# Patient Record
Sex: FEMALE | Race: WHITE | NOT HISPANIC OR LATINO | Employment: OTHER | URBAN - METROPOLITAN AREA
[De-identification: names, ages, dates, MRNs, and addresses within clinical notes are randomized per-mention and may not be internally consistent; named-entity substitution may affect disease eponyms.]

---

## 2018-06-25 ENCOUNTER — TELEPHONE (OUTPATIENT)
Dept: FAMILY MEDICINE | Facility: CLINIC | Age: 68
End: 2018-06-25

## 2018-06-25 DIAGNOSIS — R73.03 PREDIABETES: Primary | ICD-10-CM

## 2018-06-25 NOTE — TELEPHONE ENCOUNTER
----- Message from Saloni Holbrook sent at 6/25/2018 10:45 AM CDT -----  Contact: Patient  Yaneth, patient [phone] calling because she is coming in 8/1/18 and could like to have her blood work done before the appointment, so she would like the orders put in. Please advise. Thanks.

## 2018-07-09 ENCOUNTER — LAB VISIT (OUTPATIENT)
Dept: LAB | Facility: HOSPITAL | Age: 68
End: 2018-07-09
Attending: FAMILY MEDICINE
Payer: MEDICARE

## 2018-07-09 DIAGNOSIS — Z80.41 FAMILY HISTORY OF MALIGNANT NEOPLASM OF OVARY IN FIRST DEGREE RELATIVE: ICD-10-CM

## 2018-07-09 DIAGNOSIS — R73.03 PREDIABETES: Primary | ICD-10-CM

## 2018-07-09 LAB
ALBUMIN SERPL BCP-MCNC: 3.7 G/DL
ALP SERPL-CCNC: 110 U/L
ALT SERPL W/O P-5'-P-CCNC: 24 U/L
ANION GAP SERPL CALC-SCNC: 6 MMOL/L
AST SERPL-CCNC: 24 U/L
BASOPHILS # BLD AUTO: 0.07 K/UL
BASOPHILS NFR BLD: 1 %
BILIRUB SERPL-MCNC: 0.7 MG/DL
BUN SERPL-MCNC: 19 MG/DL
CALCIUM SERPL-MCNC: 9 MG/DL
CHLORIDE SERPL-SCNC: 105 MMOL/L
CHOLEST SERPL-MCNC: 181 MG/DL
CHOLEST/HDLC SERPL: 4.6 {RATIO}
CO2 SERPL-SCNC: 31 MMOL/L
CREAT SERPL-MCNC: 1 MG/DL
DIFFERENTIAL METHOD: ABNORMAL
EOSINOPHIL # BLD AUTO: 0.1 K/UL
EOSINOPHIL NFR BLD: 1.9 %
ERYTHROCYTE [DISTWIDTH] IN BLOOD BY AUTOMATED COUNT: 13.4 %
EST. GFR  (AFRICAN AMERICAN): >60 ML/MIN/1.73 M^2
EST. GFR  (NON AFRICAN AMERICAN): 58.4 ML/MIN/1.73 M^2
ESTIMATED AVG GLUCOSE: 137 MG/DL
GLUCOSE SERPL-MCNC: 119 MG/DL
HBA1C MFR BLD HPLC: 6.4 %
HCT VFR BLD AUTO: 39 %
HDLC SERPL-MCNC: 39 MG/DL
HDLC SERPL: 21.5 %
HGB BLD-MCNC: 12.6 G/DL
IMM GRANULOCYTES # BLD AUTO: 0.18 K/UL
IMM GRANULOCYTES NFR BLD AUTO: 2.5 %
LDLC SERPL CALC-MCNC: 113.6 MG/DL
LYMPHOCYTES # BLD AUTO: 2.5 K/UL
LYMPHOCYTES NFR BLD: 34 %
MCH RBC QN AUTO: 29.4 PG
MCHC RBC AUTO-ENTMCNC: 32.3 G/DL
MCV RBC AUTO: 91 FL
MONOCYTES # BLD AUTO: 0.4 K/UL
MONOCYTES NFR BLD: 5.4 %
NEUTROPHILS # BLD AUTO: 4 K/UL
NEUTROPHILS NFR BLD: 55.2 %
NONHDLC SERPL-MCNC: 142 MG/DL
NRBC BLD-RTO: 0 /100 WBC
PLATELET # BLD AUTO: 218 K/UL
PMV BLD AUTO: 9.8 FL
POTASSIUM SERPL-SCNC: 4.6 MMOL/L
PROT SERPL-MCNC: 7.6 G/DL
RBC # BLD AUTO: 4.28 M/UL
SODIUM SERPL-SCNC: 142 MMOL/L
TRIGL SERPL-MCNC: 142 MG/DL
TSH SERPL DL<=0.005 MIU/L-ACNC: 4.43 UIU/ML
WBC # BLD AUTO: 7.24 K/UL

## 2018-07-09 PROCEDURE — 86304 IMMUNOASSAY TUMOR CA 125: CPT

## 2018-07-09 PROCEDURE — 85025 COMPLETE CBC W/AUTO DIFF WBC: CPT

## 2018-07-09 PROCEDURE — 80061 LIPID PANEL: CPT

## 2018-07-09 PROCEDURE — 83036 HEMOGLOBIN GLYCOSYLATED A1C: CPT

## 2018-07-09 PROCEDURE — 36415 COLL VENOUS BLD VENIPUNCTURE: CPT

## 2018-07-09 PROCEDURE — 84443 ASSAY THYROID STIM HORMONE: CPT

## 2018-07-09 PROCEDURE — 80053 COMPREHEN METABOLIC PANEL: CPT

## 2018-07-10 LAB — CANCER AG125 SERPL-ACNC: 11 U/ML

## 2018-08-01 ENCOUNTER — OFFICE VISIT (OUTPATIENT)
Dept: FAMILY MEDICINE | Facility: CLINIC | Age: 68
End: 2018-08-01
Payer: MEDICARE

## 2018-08-01 VITALS
WEIGHT: 243 LBS | OXYGEN SATURATION: 99 % | TEMPERATURE: 99 F | SYSTOLIC BLOOD PRESSURE: 162 MMHG | DIASTOLIC BLOOD PRESSURE: 58 MMHG | BODY MASS INDEX: 40.48 KG/M2 | HEIGHT: 65 IN | HEART RATE: 63 BPM | RESPIRATION RATE: 18 BRPM

## 2018-08-01 DIAGNOSIS — Z76.0 ENCOUNTER FOR MEDICATION REFILL: Primary | ICD-10-CM

## 2018-08-01 DIAGNOSIS — R73.03 PREDIABETES: ICD-10-CM

## 2018-08-01 PROCEDURE — 99213 OFFICE O/P EST LOW 20 MIN: CPT | Mod: PBBFAC,PN | Performed by: FAMILY MEDICINE

## 2018-08-01 PROCEDURE — 99999 PR PBB SHADOW E&M-EST. PATIENT-LVL III: CPT | Mod: PBBFAC,,, | Performed by: FAMILY MEDICINE

## 2018-08-01 PROCEDURE — 99215 OFFICE O/P EST HI 40 MIN: CPT | Mod: S$PBB,,, | Performed by: FAMILY MEDICINE

## 2018-08-01 RX ORDER — SCOLOPAMINE TRANSDERMAL SYSTEM 1 MG/1
PATCH, EXTENDED RELEASE TRANSDERMAL
COMMUNITY
End: 2018-08-01 | Stop reason: SDUPTHER

## 2018-08-01 RX ORDER — OMEPRAZOLE 20 MG/1
20 CAPSULE, DELAYED RELEASE ORAL DAILY
Qty: 90 CAPSULE | Refills: 3 | Status: SHIPPED | OUTPATIENT
Start: 2018-08-01 | End: 2019-08-03 | Stop reason: SDUPTHER

## 2018-08-01 RX ORDER — METFORMIN HYDROCHLORIDE 500 MG/1
500 TABLET, EXTENDED RELEASE ORAL
Qty: 90 TABLET | Refills: 3 | Status: SHIPPED | OUTPATIENT
Start: 2018-08-01 | End: 2018-10-30

## 2018-08-01 RX ORDER — OMEPRAZOLE 20 MG/1
CAPSULE, DELAYED RELEASE ORAL
COMMUNITY
Start: 2018-07-17 | End: 2018-08-01 | Stop reason: SDUPTHER

## 2018-08-01 RX ORDER — SCOLOPAMINE TRANSDERMAL SYSTEM 1 MG/1
1 PATCH, EXTENDED RELEASE TRANSDERMAL
Qty: 5 PATCH | Refills: 1 | Status: SHIPPED | OUTPATIENT
Start: 2018-08-01 | End: 2018-10-30

## 2018-08-01 NOTE — PROGRESS NOTES
"Subjective:       Patient ID: Yaneth Vera is a 68 y.o. female.    Chief Complaint: Annual Exam    GERD well controlled with current meds, requests refill.    Has been working on diet and exercise, A1c elevated at 6.4.      Review of Systems   Constitutional: Negative for activity change, appetite change, chills, fatigue and fever.   HENT: Negative for congestion, dental problem, facial swelling, nosebleeds, postnasal drip, sinus pain, sore throat, trouble swallowing and voice change.    Eyes: Negative for pain, discharge and visual disturbance.   Respiratory: Negative for apnea, cough, chest tightness and shortness of breath.    Cardiovascular: Negative for chest pain and palpitations.   Gastrointestinal: Negative for abdominal pain, blood in stool, constipation and nausea.   Endocrine: Negative for cold intolerance, polydipsia and polyuria.   Genitourinary: Negative for difficulty urinating, enuresis and flank pain.   Musculoskeletal: Negative for arthralgias and back pain.   Skin: Negative for color change.   Allergic/Immunologic: Negative for environmental allergies and immunocompromised state.   Neurological: Negative for dizziness and light-headedness.   Hematological: Negative for adenopathy.   Psychiatric/Behavioral: Negative for agitation, behavioral problems, decreased concentration and dysphoric mood. The patient is not nervous/anxious.    All other systems reviewed and are negative.        Reviewed family, medical, surgical, and social history.    Objective:      BP (!) 162/58 (BP Location: Left arm)   Pulse 63   Temp 98.6 °F (37 °C)   Resp 18   Ht 5' 5" (1.651 m)   Wt 110.2 kg (243 lb)   SpO2 99%   BMI 40.44 kg/m²   Physical Exam   Constitutional: She is oriented to person, place, and time. She appears well-developed and well-nourished. No distress.   HENT:   Head: Normocephalic and atraumatic.   Nose: Nose normal.   Mouth/Throat: Oropharynx is clear and moist. No oropharyngeal exudate.   Eyes: " Conjunctivae and EOM are normal. Pupils are equal, round, and reactive to light. No scleral icterus.   Neck: Normal range of motion. Neck supple. No thyromegaly present.   Cardiovascular: Normal rate, regular rhythm and normal heart sounds.  Exam reveals no gallop and no friction rub.    No murmur heard.  Pulmonary/Chest: Effort normal and breath sounds normal. No respiratory distress. She has no wheezes. She has no rales. She exhibits no tenderness.   Abdominal: Soft. Bowel sounds are normal. She exhibits no distension. There is no tenderness. There is no guarding.   Musculoskeletal: Normal range of motion. She exhibits no edema, tenderness or deformity.   Lymphadenopathy:     She has no cervical adenopathy.   Neurological: She is alert and oriented to person, place, and time. She displays normal reflexes. No cranial nerve deficit or sensory deficit. She exhibits normal muscle tone.   Skin: Skin is warm and dry. No rash noted. She is not diaphoretic. No erythema. No pallor.   Psychiatric: She has a normal mood and affect. Her behavior is normal. Judgment and thought content normal.   Nursing note and vitals reviewed.      Assessment:       1. Encounter for medication refill    2. Prediabetes        Plan:       Encounter for medication refill  -     omeprazole (PRILOSEC) 20 MG capsule; Take 1 capsule (20 mg total) by mouth once daily.  Dispense: 90 capsule; Refill: 3  -     scopolamine (TRANSDERM-SCOP) 1.3-1.5 mg (1 mg over 3 days); Place 1 patch onto the skin Every 3 (three) days.  Dispense: 5 patch; Refill: 1    Prediabetes    Other orders  -     metFORMIN (GLUCOPHAGE-XR) 500 MG 24 hr tablet; Take 1 tablet (500 mg total) by mouth daily with breakfast.  Dispense: 90 tablet; Refill: 3    Discussed diet, exercise, weight loss, risk factors, etc with prediabetes, 40 minutes spent face to face, with over half in counseling.        Risks, benefits, and side effects were discussed with the patient. All questions were  answered to the fullest satisfaction of the patient, and pt verbalized understanding and agreement to treatment plan. Pt was to call with any new or worsening symptoms, or present to the ER.

## 2018-08-28 ENCOUNTER — TELEPHONE (OUTPATIENT)
Dept: FAMILY MEDICINE | Facility: CLINIC | Age: 68
End: 2018-08-28

## 2018-08-28 NOTE — TELEPHONE ENCOUNTER
"I have spoken with pt.  She stated that she had tightness of the chest last week, went to urgent care, was told it was URI and given ABX.  Pt stated that she had pain in her gums and chest tightness/pain last night.  I  advised that patient report to the ER or urgent care; pt did not wish to do this, as she stated that her symptoms have subsided since beginning amoxicillin, however, she did report tightness of the chest and "heart racing" last night. At patient's request, appt made with Dr. Jean-Baptiste on Thursday.     Charla    "

## 2018-08-28 NOTE — TELEPHONE ENCOUNTER
----- Message from Cesilia Toro sent at 8/28/2018  9:00 AM CDT -----  Contact: self  Patient having heart issues. Would like to be seen today. Please call back at   [phone]

## 2018-08-30 ENCOUNTER — OFFICE VISIT (OUTPATIENT)
Dept: FAMILY MEDICINE | Facility: CLINIC | Age: 68
End: 2018-08-30
Payer: MEDICARE

## 2018-08-30 ENCOUNTER — TELEPHONE (OUTPATIENT)
Dept: FAMILY MEDICINE | Facility: CLINIC | Age: 68
End: 2018-08-30

## 2018-08-30 ENCOUNTER — HOSPITAL ENCOUNTER (OUTPATIENT)
Dept: RADIOLOGY | Facility: HOSPITAL | Age: 68
Discharge: HOME | End: 2018-08-30
Attending: FAMILY MEDICINE
Payer: MEDICARE

## 2018-08-30 ENCOUNTER — HOSPITAL ENCOUNTER (OUTPATIENT)
Dept: CARDIOLOGY | Facility: HOSPITAL | Age: 68
Discharge: HOME | End: 2018-08-30
Attending: FAMILY MEDICINE
Payer: MEDICARE

## 2018-08-30 VITALS
WEIGHT: 242.5 LBS | BODY MASS INDEX: 40.4 KG/M2 | HEIGHT: 65 IN | SYSTOLIC BLOOD PRESSURE: 149 MMHG | OXYGEN SATURATION: 97 % | HEART RATE: 57 BPM | DIASTOLIC BLOOD PRESSURE: 70 MMHG | TEMPERATURE: 98 F | RESPIRATION RATE: 18 BRPM

## 2018-08-30 DIAGNOSIS — R07.9 CHEST PAIN, UNSPECIFIED TYPE: Primary | ICD-10-CM

## 2018-08-30 DIAGNOSIS — R00.2 PALPITATIONS: ICD-10-CM

## 2018-08-30 DIAGNOSIS — R07.9 CHEST PAIN, UNSPECIFIED TYPE: ICD-10-CM

## 2018-08-30 PROCEDURE — 71046 X-RAY EXAM CHEST 2 VIEWS: CPT | Mod: TC,FY

## 2018-08-30 PROCEDURE — 99999 PR PBB SHADOW E&M-EST. PATIENT-LVL IV: CPT | Mod: PBBFAC,,, | Performed by: FAMILY MEDICINE

## 2018-08-30 PROCEDURE — 71046 X-RAY EXAM CHEST 2 VIEWS: CPT | Mod: 26,,, | Performed by: RADIOLOGY

## 2018-08-30 PROCEDURE — 93005 ELECTROCARDIOGRAM TRACING: CPT

## 2018-08-30 PROCEDURE — 99214 OFFICE O/P EST MOD 30 MIN: CPT | Mod: PBBFAC,25,PN | Performed by: FAMILY MEDICINE

## 2018-08-30 PROCEDURE — 99214 OFFICE O/P EST MOD 30 MIN: CPT | Mod: S$PBB,,, | Performed by: FAMILY MEDICINE

## 2018-08-30 RX ORDER — AMOXICILLIN 250 MG/1
CAPSULE ORAL
COMMUNITY
Start: 2018-08-25 | End: 2018-10-30

## 2018-08-30 NOTE — TELEPHONE ENCOUNTER
Pt informed of results.  She would like to see a cardiologist who is local and I will fax her referral to the cardiologists who see patient's here two days a week.  Charla

## 2018-08-30 NOTE — TELEPHONE ENCOUNTER
----- Message from Josemanuel Jean-Baptiste MD sent at 8/30/2018  3:49 PM CDT -----  So far, the labs, CXR and EKG look OK.  When is her Cardiology appt?

## 2018-08-30 NOTE — PROGRESS NOTES
Subjective:       Patient ID: Yaneth Vera is a 68 y.o. female.    Chief Complaint: tightness in chest; Excessive Sweating; increased heart rate; Diarrhea; and pain in gums    New to me patient here for UC visit.    Pt does some mild exercises at a gym [employer] - chair work and treadmill - no CP with that exertion.      Chest Pain    This is a new problem. The current episode started in the past 7 days. The problem occurs intermittently. The pain is present in the substernal region. The pain is moderate. The quality of the pain is described as dull. The pain radiates to the right jaw and left jaw. Associated symptoms include exertional chest pressure, nausea and palpitations. Pertinent negatives include no abdominal pain. Associated symptoms comments: Initial CP seemed to be related to breathing; Rx at an  with antibiotic (but no other URI symptoms).  Next episode was when she woke up with pain in jaw/guns and her heart was racing.. Risk factors include obesity.   Pertinent negatives for past medical history include no hypertension. Past medical history comments: No Tobacco use or elevated lipids. Family history comments: Valvular Heart Disease     Review of Systems   Cardiovascular: Positive for chest pain and palpitations.   Gastrointestinal: Positive for diarrhea and nausea. Negative for abdominal pain.       Objective:      Physical Exam   Constitutional: She appears well-developed and well-nourished.   Eyes: Pupils are equal, round, and reactive to light. No scleral icterus.   Neck: Neck supple.   Cardiovascular: Normal rate and regular rhythm.   No murmur heard.  Pulmonary/Chest: Effort normal and breath sounds normal.   Musculoskeletal: She exhibits no edema or tenderness.   Lymphadenopathy:     She has no cervical adenopathy.   Skin: Skin is warm and dry.       Assessment:       1. Chest pain, unspecified type    2. Palpitations        Plan:       Yaneth was seen today for tightness in chest, excessive sweating,  increased heart rate, diarrhea and pain in gums.    Diagnoses and all orders for this visit:    Chest pain, unspecified type  -     SCHEDULED EKG 12-LEAD (to Muse); Future  -     Troponin I; Future  -     CK-MB; Future  -     X-Ray Chest PA And Lateral; Future  -     Ambulatory referral to Cardiology    Palpitations  -     SCHEDULED EKG 12-LEAD (to Muse); Future  -     Troponin I; Future  -     CK-MB; Future  -     X-Ray Chest PA And Lateral; Future  -     Ambulatory referral to Cardiology      Patient Instructions   Aspirin 81 mg a day or every other day.  Uncertain Causes of Chest Pain    Chest pain can happen for a number of reasons. Sometimes the cause can't be determined. If your condition does not seem serious, and your pain does not appear to be coming from your heart, your healthcare provider may recommend watching it closely. Sometimes the signs of a serious problem take more time to appear. Many problems not related to your heart can cause chest pain.These include:  · Musculoskeletal. Costochondritis, an inflammation of the tissues around the ribs that can occur from trauma or overuse injuries  · Respiratory. Pneumonia, pneumothorax, or pneumonitis (inflammation of the lining of the chest and lungs)  · Gastrointestinal. Esophageal reflux, heartburn, or gallbladder disease  · Anxiety and panic disorders  · Nerve compression and neuritis  · Miscellaneous problems such as aortic aneurysm or pulmonary embolism (a blood clot in the lungs)  Home care  After your visit, follow these recommendations:  · Rest today and avoid strenuous activity.  · Take any prescribed medicine as directed.  · Be aware of any recurrent chest pain and notice any changes  Follow-up care  Follow up with your healthcare provider if you do not start to feel better within 24 hours, or as advised.  Call 911  Call 911 if any of these occur:  · A change in the type of pain: if it feels different, becomes more severe, lasts longer, or begins  to spread into your shoulder, arm, neck, jaw or back  · Shortness of breath or increased pain with breathing  · Weakness, dizziness, or fainting  · Rapid heart beat  · Crushing sensation in your chest  When to seek medical advice  Call your healthcare provider right away if any of the following occur:  · Cough with dark colored sputum (phlegm) or blood  · Fever of 100.4ºF (38ºC) or higher, or as directed by your healthcare provider  · Swelling, pain or redness in one leg  · Shortness of breath  Date Last Reviewed: 12/30/2015  © 4916-3384 [employer]. [address] [place] [id number]. All rights reserved. This information is not intended as a substitute for professional medical care. Always follow your healthcare professional's instructions.

## 2018-08-30 NOTE — PATIENT INSTRUCTIONS
Aspirin 81 mg a day or every other day.  Uncertain Causes of Chest Pain    Chest pain can happen for a number of reasons. Sometimes the cause can't be determined. If your condition does not seem serious, and your pain does not appear to be coming from your heart, your healthcare provider may recommend watching it closely. Sometimes the signs of a serious problem take more time to appear. Many problems not related to your heart can cause chest pain.These include:  · Musculoskeletal. Costochondritis, an inflammation of the tissues around the ribs that can occur from trauma or overuse injuries  · Respiratory. Pneumonia, pneumothorax, or pneumonitis (inflammation of the lining of the chest and lungs)  · Gastrointestinal. Esophageal reflux, heartburn, or gallbladder disease  · Anxiety and panic disorders  · Nerve compression and neuritis  · Miscellaneous problems such as aortic aneurysm or pulmonary embolism (a blood clot in the lungs)  Home care  After your visit, follow these recommendations:  · Rest today and avoid strenuous activity.  · Take any prescribed medicine as directed.  · Be aware of any recurrent chest pain and notice any changes  Follow-up care  Follow up with your healthcare provider if you do not start to feel better within 24 hours, or as advised.  Call 911  Call 911 if any of these occur:  · A change in the type of pain: if it feels different, becomes more severe, lasts longer, or begins to spread into your shoulder, arm, neck, jaw or back  · Shortness of breath or increased pain with breathing  · Weakness, dizziness, or fainting  · Rapid heart beat  · Crushing sensation in your chest  When to seek medical advice  Call your healthcare provider right away if any of the following occur:  · Cough with dark colored sputum (phlegm) or blood  · Fever of 100.4ºF (38ºC) or higher, or as directed by your healthcare provider  · Swelling, pain or redness in one leg  · Shortness of breath  Date Last Reviewed:  12/30/2015  © 8205-0658 [employer]. [address] [place] [address], [place] [address]. All rights reserved. This information is not intended as a substitute for professional medical care. Always follow your healthcare professional's instructions.

## 2018-08-31 NOTE — TELEPHONE ENCOUNTER
It was put in, however, when I spoke with her and she realized that it would be a cardiologist that would not be in this immediate area, she requested a closer one; that is the reason that I faxed it to the cardiologists who see patients here this [facility] a couple/few days a week. Is that okay?  Charla

## 2018-09-04 ENCOUNTER — TELEPHONE (OUTPATIENT)
Dept: FAMILY MEDICINE | Facility: CLINIC | Age: 68
End: 2018-09-04

## 2018-09-04 NOTE — TELEPHONE ENCOUNTER
I have spoken with pt, informed her that it would be uncommon but she may stop the metformin for a couple of days to see if the symptoms subside.  Charla

## 2018-09-04 NOTE — TELEPHONE ENCOUNTER
----- Message from Amee Travis sent at 9/4/2018 10:30 AM CDT -----  Contact: patient   Patient calling to schedule an appointment with Cardiologist Jeb Lake MD. He comes to the [facility] location twice a month. Please advise.   Call back    Thanks!

## 2018-09-04 NOTE — TELEPHONE ENCOUNTER
That would be an uncommon side effect, but she can try stopping the medicine for a day or two and see if the symptoms resolve

## 2018-09-04 NOTE — TELEPHONE ENCOUNTER
"I have spoken with pt and gave [facility] telephone number.      Pt is asking if the metformin is causing the "heart racing"; stated that she believes the problems for which she is being referred to cardiology are side effects of the metformin.  Please advise and thanks, Charla  "

## 2018-10-30 ENCOUNTER — OFFICE VISIT (OUTPATIENT)
Dept: FAMILY MEDICINE | Facility: CLINIC | Age: 68
End: 2018-10-30
Payer: MEDICARE

## 2018-10-30 VITALS
SYSTOLIC BLOOD PRESSURE: 162 MMHG | WEIGHT: 238.81 LBS | RESPIRATION RATE: 18 BRPM | OXYGEN SATURATION: 97 % | HEIGHT: 65 IN | BODY MASS INDEX: 39.79 KG/M2 | DIASTOLIC BLOOD PRESSURE: 64 MMHG | HEART RATE: 59 BPM

## 2018-10-30 DIAGNOSIS — R73.03 PREDIABETES: ICD-10-CM

## 2018-10-30 DIAGNOSIS — M81.0 SENILE OSTEOPOROSIS: Primary | ICD-10-CM

## 2018-10-30 PROCEDURE — 99999 PR PBB SHADOW E&M-EST. PATIENT-LVL III: CPT | Mod: PBBFAC,,, | Performed by: FAMILY MEDICINE

## 2018-10-30 PROCEDURE — 99214 OFFICE O/P EST MOD 30 MIN: CPT | Mod: S$PBB,,, | Performed by: FAMILY MEDICINE

## 2018-10-30 PROCEDURE — 99213 OFFICE O/P EST LOW 20 MIN: CPT | Mod: PBBFAC,PN | Performed by: FAMILY MEDICINE

## 2018-10-30 RX ORDER — GLIMEPIRIDE 1 MG/1
1 TABLET ORAL
Qty: 30 TABLET | Refills: 3 | Status: SHIPPED | OUTPATIENT
Start: 2018-10-30 | End: 2019-06-17 | Stop reason: SDUPTHER

## 2018-11-01 ENCOUNTER — HOSPITAL ENCOUNTER (OUTPATIENT)
Dept: RADIOLOGY | Facility: HOSPITAL | Age: 68
Discharge: HOME | End: 2018-11-01
Attending: FAMILY MEDICINE
Payer: MEDICARE

## 2018-11-01 DIAGNOSIS — M81.0 SENILE OSTEOPOROSIS: ICD-10-CM

## 2018-11-01 PROCEDURE — 77080 DXA BONE DENSITY AXIAL: CPT | Mod: TC

## 2018-11-01 PROCEDURE — 77080 DXA BONE DENSITY AXIAL: CPT | Mod: 26,,, | Performed by: RADIOLOGY

## 2019-05-02 DIAGNOSIS — Z12.11 COLON CANCER SCREENING: ICD-10-CM

## 2019-05-03 DIAGNOSIS — E11.9 TYPE 2 DIABETES MELLITUS WITHOUT COMPLICATION: ICD-10-CM

## 2019-05-03 DIAGNOSIS — Z11.59 NEED FOR HEPATITIS C SCREENING TEST: ICD-10-CM

## 2019-06-17 DIAGNOSIS — R73.03 PREDIABETES: ICD-10-CM

## 2019-06-17 RX ORDER — GLIMEPIRIDE 1 MG/1
1 TABLET ORAL
Qty: 30 TABLET | Refills: 3 | Status: SHIPPED | OUTPATIENT
Start: 2019-06-17 | End: 2020-02-14

## 2019-06-17 NOTE — TELEPHONE ENCOUNTER
----- Message from Rocio Johnson sent at 6/17/2019  9:07 AM CDT -----  Contact: self   Patient need a refill on glimepiride 1 MG 30day supply please send to [facility], any questions please call back at [phone] (home)     [facility] - [place], [place] - [address]  [address]  [place] [address]  Phone: [phone] Fax: [phone]

## 2019-06-27 DIAGNOSIS — Z12.39 BREAST CANCER SCREENING: ICD-10-CM

## 2019-07-11 DIAGNOSIS — E11.9 TYPE 2 DIABETES MELLITUS WITHOUT COMPLICATION: ICD-10-CM

## 2019-08-03 DIAGNOSIS — Z76.0 ENCOUNTER FOR MEDICATION REFILL: ICD-10-CM

## 2019-08-04 RX ORDER — OMEPRAZOLE 20 MG/1
CAPSULE, DELAYED RELEASE ORAL
Qty: 90 CAPSULE | Refills: 3 | Status: SHIPPED | OUTPATIENT
Start: 2019-08-04 | End: 2020-05-28 | Stop reason: SDUPTHER

## 2019-08-19 ENCOUNTER — TELEPHONE (OUTPATIENT)
Dept: FAMILY MEDICINE | Facility: CLINIC | Age: 69
End: 2019-08-19

## 2019-08-19 DIAGNOSIS — R73.03 PREDIABETES: Primary | ICD-10-CM

## 2019-08-26 ENCOUNTER — TELEPHONE (OUTPATIENT)
Dept: FAMILY MEDICINE | Facility: CLINIC | Age: 69
End: 2019-08-26

## 2019-08-26 DIAGNOSIS — R30.0 DYSURIA: Primary | ICD-10-CM

## 2019-08-26 NOTE — TELEPHONE ENCOUNTER
Pt c/o dysuria and frequent urination.   Pt requesting lab orders placed for possible UTI.  Please advise, thank you.        ----- Message from Price Riggins sent at 8/26/2019  9:42 AM CDT -----  Contact: Patient  Type: Needs Medical Advice    Who Called:  Patient  Symptoms (please be specific):  UTI  Best Call Back Number: [phone]  Additional Information: Patient is requesting orders for urinalysis to be tested for UTI. Please advise when orders are in

## 2019-08-27 ENCOUNTER — LAB VISIT (OUTPATIENT)
Dept: LAB | Facility: HOSPITAL | Age: 69
End: 2019-08-27
Attending: FAMILY MEDICINE
Payer: MEDICARE

## 2019-08-27 DIAGNOSIS — R30.0 DYSURIA: ICD-10-CM

## 2019-08-27 LAB
BILIRUB UR QL STRIP: NEGATIVE
CLARITY UR: CLEAR
COLOR UR: YELLOW
GLUCOSE UR QL STRIP: NEGATIVE
HGB UR QL STRIP: NEGATIVE
KETONES UR QL STRIP: ABNORMAL
LEUKOCYTE ESTERASE UR QL STRIP: NEGATIVE
NITRITE UR QL STRIP: NEGATIVE
PH UR STRIP: 5 [PH] (ref 5–8)
PROT UR QL STRIP: NEGATIVE
SP GR UR STRIP: 1.02 (ref 1–1.03)
URN SPEC COLLECT METH UR: ABNORMAL
UROBILINOGEN UR STRIP-ACNC: NEGATIVE EU/DL

## 2019-08-27 PROCEDURE — 87086 URINE CULTURE/COLONY COUNT: CPT

## 2019-08-27 PROCEDURE — 81003 URINALYSIS AUTO W/O SCOPE: CPT

## 2019-08-28 LAB — BACTERIA UR CULT: NORMAL

## 2019-08-29 ENCOUNTER — TELEPHONE (OUTPATIENT)
Dept: FAMILY MEDICINE | Facility: CLINIC | Age: 69
End: 2019-08-29

## 2019-08-29 NOTE — TELEPHONE ENCOUNTER
Informed pt that her urine results did not show an infection. Pt stated that she is still having lower back pain, but she's fine that she'll discuss further with the provider when she comes in for appt in October.

## 2019-08-29 NOTE — TELEPHONE ENCOUNTER
----- Message from Cesar Vanessa MD sent at 8/29/2019  6:16 AM CDT -----  Urine studies did not show an infection. How is she feeling?

## 2019-09-03 ENCOUNTER — TELEPHONE (OUTPATIENT)
Dept: FAMILY MEDICINE | Facility: CLINIC | Age: 69
End: 2019-09-03

## 2019-09-24 ENCOUNTER — LAB VISIT (OUTPATIENT)
Dept: LAB | Facility: HOSPITAL | Age: 69
End: 2019-09-24
Attending: FAMILY MEDICINE
Payer: MEDICARE

## 2019-09-24 DIAGNOSIS — R73.03 PREDIABETES: ICD-10-CM

## 2019-09-24 LAB
ALBUMIN SERPL BCP-MCNC: 3.7 G/DL (ref 3.5–5.2)
ALP SERPL-CCNC: 97 U/L (ref 55–135)
ALT SERPL W/O P-5'-P-CCNC: 24 U/L (ref 10–44)
ANION GAP SERPL CALC-SCNC: 10 MMOL/L (ref 8–16)
AST SERPL-CCNC: 23 U/L (ref 10–40)
BASOPHILS # BLD AUTO: 0.06 K/UL (ref 0–0.2)
BASOPHILS NFR BLD: 0.9 % (ref 0–1.9)
BILIRUB SERPL-MCNC: 0.8 MG/DL (ref 0.1–1)
BUN SERPL-MCNC: 20 MG/DL (ref 8–23)
CALCIUM SERPL-MCNC: 8.8 MG/DL (ref 8.7–10.5)
CHLORIDE SERPL-SCNC: 104 MMOL/L (ref 95–110)
CHOLEST SERPL-MCNC: 196 MG/DL (ref 120–199)
CHOLEST/HDLC SERPL: 3.9 {RATIO} (ref 2–5)
CO2 SERPL-SCNC: 25 MMOL/L (ref 23–29)
CREAT SERPL-MCNC: 0.9 MG/DL (ref 0.5–1.4)
DIFFERENTIAL METHOD: ABNORMAL
EOSINOPHIL # BLD AUTO: 0.1 K/UL (ref 0–0.5)
EOSINOPHIL NFR BLD: 1.1 % (ref 0–8)
ERYTHROCYTE [DISTWIDTH] IN BLOOD BY AUTOMATED COUNT: 13.5 % (ref 11.5–14.5)
EST. GFR  (AFRICAN AMERICAN): >60 ML/MIN/1.73 M^2
EST. GFR  (NON AFRICAN AMERICAN): >60 ML/MIN/1.73 M^2
ESTIMATED AVG GLUCOSE: 140 MG/DL (ref 68–131)
GLUCOSE SERPL-MCNC: 127 MG/DL (ref 70–110)
HBA1C MFR BLD HPLC: 6.5 % (ref 4.5–6.2)
HCT VFR BLD AUTO: 40.9 % (ref 37–48.5)
HDLC SERPL-MCNC: 50 MG/DL (ref 40–75)
HDLC SERPL: 25.5 % (ref 20–50)
HGB BLD-MCNC: 12.9 G/DL (ref 12–16)
IMM GRANULOCYTES # BLD AUTO: 0.05 K/UL (ref 0–0.04)
IMM GRANULOCYTES NFR BLD AUTO: 0.8 % (ref 0–0.5)
LDLC SERPL CALC-MCNC: 121.2 MG/DL (ref 63–159)
LYMPHOCYTES # BLD AUTO: 1.8 K/UL (ref 1–4.8)
LYMPHOCYTES NFR BLD: 27.6 % (ref 18–48)
MCH RBC QN AUTO: 28.5 PG (ref 27–31)
MCHC RBC AUTO-ENTMCNC: 31.5 G/DL (ref 32–36)
MCV RBC AUTO: 90 FL (ref 82–98)
MONOCYTES # BLD AUTO: 0.4 K/UL (ref 0.3–1)
MONOCYTES NFR BLD: 6.6 % (ref 4–15)
NEUTROPHILS # BLD AUTO: 4.1 K/UL (ref 1.8–7.7)
NEUTROPHILS NFR BLD: 63 % (ref 38–73)
NONHDLC SERPL-MCNC: 146 MG/DL
NRBC BLD-RTO: 0 /100 WBC
PLATELET # BLD AUTO: 208 K/UL (ref 150–350)
PMV BLD AUTO: 9.7 FL (ref 9.2–12.9)
POTASSIUM SERPL-SCNC: 4 MMOL/L (ref 3.5–5.1)
PROT SERPL-MCNC: 7.4 G/DL (ref 6–8.4)
RBC # BLD AUTO: 4.53 M/UL (ref 4–5.4)
SODIUM SERPL-SCNC: 139 MMOL/L (ref 136–145)
T4 FREE SERPL-MCNC: 1.01 NG/DL (ref 0.71–1.51)
TRIGL SERPL-MCNC: 124 MG/DL (ref 30–150)
TSH SERPL DL<=0.005 MIU/L-ACNC: 3.6 UIU/ML (ref 0.34–5.6)
WBC # BLD AUTO: 6.56 K/UL (ref 3.9–12.7)

## 2019-09-24 PROCEDURE — 84443 ASSAY THYROID STIM HORMONE: CPT

## 2019-09-24 PROCEDURE — 83036 HEMOGLOBIN GLYCOSYLATED A1C: CPT

## 2019-09-24 PROCEDURE — 80061 LIPID PANEL: CPT

## 2019-09-24 PROCEDURE — 85025 COMPLETE CBC W/AUTO DIFF WBC: CPT

## 2019-09-24 PROCEDURE — 36415 COLL VENOUS BLD VENIPUNCTURE: CPT

## 2019-09-24 PROCEDURE — 80053 COMPREHEN METABOLIC PANEL: CPT

## 2019-09-24 PROCEDURE — 84439 ASSAY OF FREE THYROXINE: CPT

## 2019-10-29 ENCOUNTER — LAB VISIT (OUTPATIENT)
Dept: LAB | Facility: HOSPITAL | Age: 69
End: 2019-10-29
Attending: FAMILY MEDICINE
Payer: MEDICARE

## 2019-10-29 DIAGNOSIS — Z12.11 COLON CANCER SCREENING: ICD-10-CM

## 2019-10-29 PROCEDURE — 82274 ASSAY TEST FOR BLOOD FECAL: CPT

## 2019-10-30 ENCOUNTER — OFFICE VISIT (OUTPATIENT)
Dept: FAMILY MEDICINE | Facility: CLINIC | Age: 69
End: 2019-10-30
Payer: MEDICARE

## 2019-10-30 ENCOUNTER — PATIENT OUTREACH (OUTPATIENT)
Dept: FAMILY MEDICINE | Facility: CLINIC | Age: 69
End: 2019-10-30

## 2019-10-30 VITALS
SYSTOLIC BLOOD PRESSURE: 137 MMHG | OXYGEN SATURATION: 97 % | HEIGHT: 65 IN | BODY MASS INDEX: 43.59 KG/M2 | HEART RATE: 58 BPM | DIASTOLIC BLOOD PRESSURE: 60 MMHG | RESPIRATION RATE: 20 BRPM | WEIGHT: 261.63 LBS

## 2019-10-30 DIAGNOSIS — R73.03 PREDIABETES: ICD-10-CM

## 2019-10-30 DIAGNOSIS — Z00.00 ANNUAL PHYSICAL EXAM: Primary | ICD-10-CM

## 2019-10-30 DIAGNOSIS — Z23 NEED FOR VACCINATION: ICD-10-CM

## 2019-10-30 PROCEDURE — 99397 PER PM REEVAL EST PAT 65+ YR: CPT | Mod: S$PBB,,, | Performed by: FAMILY MEDICINE

## 2019-10-30 PROCEDURE — G0009 ADMIN PNEUMOCOCCAL VACCINE: HCPCS | Mod: PBBFAC,PN

## 2019-10-30 PROCEDURE — 99999 PR PBB SHADOW E&M-EST. PATIENT-LVL III: CPT | Mod: PBBFAC,,, | Performed by: FAMILY MEDICINE

## 2019-10-30 PROCEDURE — 99999 PR PBB SHADOW E&M-EST. PATIENT-LVL III: ICD-10-PCS | Mod: PBBFAC,,, | Performed by: FAMILY MEDICINE

## 2019-10-30 PROCEDURE — 99397 PR PREVENTIVE VISIT,EST,65 & OVER: ICD-10-PCS | Mod: S$PBB,,, | Performed by: FAMILY MEDICINE

## 2019-10-30 PROCEDURE — 99213 OFFICE O/P EST LOW 20 MIN: CPT | Mod: PBBFAC,PN,25 | Performed by: FAMILY MEDICINE

## 2019-10-30 RX ORDER — KETOCONAZOLE 20 MG/G
CREAM TOPICAL DAILY
Qty: 60 G | Refills: 2 | Status: SHIPPED | OUTPATIENT
Start: 2019-10-30 | End: 2024-03-04

## 2019-10-30 RX ORDER — HYDROCHLOROTHIAZIDE 25 MG/1
25 TABLET ORAL DAILY
Qty: 14 TABLET | Refills: 0 | Status: SHIPPED | OUTPATIENT
Start: 2019-10-30 | End: 2020-02-14

## 2019-10-30 NOTE — PROGRESS NOTES
"Subjective:       Patient ID: Yaneth Vera is a 69 y.o. female.    Chief Complaint: Annual Exam (Wellness Visit, would like foot exam, Colonoscopy completed @ [facility], Eye exam completed w/Dr. Becerra)    Patient reports they are feeling well without complaints today. Pt reports adherence to generally healthy diet, exercise plan, and good sleep schedule. Anticipatory guidance was provided. Wellness labs and procedures for age were discussed.      Review of Systems   Constitutional: Negative for activity change, appetite change, chills, fatigue and fever.   HENT: Negative for congestion, dental problem, facial swelling, nosebleeds, postnasal drip, sinus pain, sore throat, trouble swallowing and voice change.    Eyes: Negative for pain, discharge and visual disturbance.   Respiratory: Negative for apnea, cough, chest tightness and shortness of breath.    Cardiovascular: Negative for chest pain and palpitations.   Gastrointestinal: Negative for abdominal pain, blood in stool, constipation and nausea.   Endocrine: Negative for cold intolerance, polydipsia and polyuria.   Genitourinary: Negative for difficulty urinating, enuresis and flank pain.   Musculoskeletal: Negative for arthralgias and back pain.   Skin: Negative for color change.   Allergic/Immunologic: Negative for environmental allergies and immunocompromised state.   Neurological: Negative for dizziness and light-headedness.   Hematological: Negative for adenopathy.   Psychiatric/Behavioral: Negative for agitation, behavioral problems, decreased concentration and dysphoric mood. The patient is not nervous/anxious.    All other systems reviewed and are negative.        Reviewed family, medical, surgical, and social history.    Objective:      /60   Pulse (!) 58   Resp 20   Ht 5' 5" (1.651 m)   Wt 118.7 kg (261 lb 9.6 oz)   SpO2 97%   BMI 43.53 kg/m²   Physical Exam   Constitutional: She is oriented to person, place, and time. She appears " well-developed and well-nourished. No distress.   HENT:   Head: Normocephalic and atraumatic.   Nose: Nose normal.   Mouth/Throat: Oropharynx is clear and moist. No oropharyngeal exudate.   Eyes: Pupils are equal, round, and reactive to light. Conjunctivae and EOM are normal. No scleral icterus.   Neck: Normal range of motion. Neck supple. No thyromegaly present.   Cardiovascular: Normal rate, regular rhythm and normal heart sounds. Exam reveals no gallop and no friction rub.   No murmur heard.  Pulmonary/Chest: Effort normal and breath sounds normal. No respiratory distress. She has no wheezes. She has no rales. She exhibits no tenderness.   Abdominal: Soft. Bowel sounds are normal. She exhibits no distension. There is no tenderness. There is no guarding.   Musculoskeletal: Normal range of motion. She exhibits no edema, tenderness or deformity.   Lymphadenopathy:     She has no cervical adenopathy.   Neurological: She is alert and oriented to person, place, and time. She displays normal reflexes. No cranial nerve deficit or sensory deficit. She exhibits normal muscle tone.   Skin: Skin is warm and dry. No rash noted. She is not diaphoretic. No erythema. No pallor.   Psychiatric: She has a normal mood and affect. Her behavior is normal. Judgment and thought content normal.   Nursing note and vitals reviewed.      Assessment:       1. Annual physical exam    2. Need for vaccination    3. Prediabetes        Plan:       Annual physical exam    Need for vaccination  -     Pneumococcal Conjugate Vaccine (13 Valent) (IM)    Prediabetes  -     Hemoglobin A1c; Future; Expected date: 01/30/2020    Other orders  -     ketoconazole (NIZORAL) 2 % cream; Apply topically once daily.  Dispense: 60 g; Refill: 2  -     hydroCHLOROthiazide (HYDRODIURIL) 25 MG tablet; Take 1 tablet (25 mg total) by mouth once daily. for 14 days  Dispense: 14 tablet; Refill: 0            Risks, benefits, and side effects were discussed with the  patient. All questions were answered to the fullest satisfaction of the patient, and pt verbalized understanding and agreement to treatment plan. Pt was to call with any new or worsening symptoms, or present to the ER.

## 2019-10-30 NOTE — PROGRESS NOTES
Pt received fitkit in mail and has collected sample.  Currently at Dr. Vanessa office with sample and instructed pt to place envelope in mail as it is sent to a different lab.  Voiced understanding.

## 2019-11-05 LAB — HEMOCCULT STL QL IA: NEGATIVE

## 2019-11-19 LAB
LEFT EYE DM RETINOPATHY: NEGATIVE
RIGHT EYE DM RETINOPATHY: NEGATIVE

## 2019-12-12 ENCOUNTER — PATIENT OUTREACH (OUTPATIENT)
Dept: ADMINISTRATIVE | Facility: HOSPITAL | Age: 69
End: 2019-12-12

## 2019-12-31 DIAGNOSIS — Z12.31 ENCOUNTER FOR SCREENING MAMMOGRAM FOR MALIGNANT NEOPLASM OF BREAST: Primary | ICD-10-CM

## 2020-01-15 ENCOUNTER — HOSPITAL ENCOUNTER (OUTPATIENT)
Dept: RADIOLOGY | Facility: HOSPITAL | Age: 70
Discharge: HOME | End: 2020-01-15
Attending: OBSTETRICS & GYNECOLOGY
Payer: MEDICARE

## 2020-01-15 VITALS — BODY MASS INDEX: 43.6 KG/M2 | WEIGHT: 261.69 LBS | HEIGHT: 65 IN

## 2020-01-15 DIAGNOSIS — Z12.31 ENCOUNTER FOR SCREENING MAMMOGRAM FOR MALIGNANT NEOPLASM OF BREAST: ICD-10-CM

## 2020-01-15 PROCEDURE — 77067 SCR MAMMO BI INCL CAD: CPT | Mod: TC,PO

## 2020-02-13 ENCOUNTER — PATIENT OUTREACH (OUTPATIENT)
Dept: ADMINISTRATIVE | Facility: HOSPITAL | Age: 70
End: 2020-02-13

## 2020-02-14 ENCOUNTER — OFFICE VISIT (OUTPATIENT)
Dept: FAMILY MEDICINE | Facility: CLINIC | Age: 70
End: 2020-02-14
Payer: MEDICARE

## 2020-02-14 VITALS
WEIGHT: 253 LBS | SYSTOLIC BLOOD PRESSURE: 136 MMHG | BODY MASS INDEX: 42.15 KG/M2 | HEART RATE: 63 BPM | DIASTOLIC BLOOD PRESSURE: 84 MMHG | OXYGEN SATURATION: 96 % | RESPIRATION RATE: 20 BRPM | HEIGHT: 65 IN

## 2020-02-14 DIAGNOSIS — R73.03 PREDIABETES: ICD-10-CM

## 2020-02-14 DIAGNOSIS — R60.0 LOCALIZED EDEMA: Primary | ICD-10-CM

## 2020-02-14 DIAGNOSIS — J01.00 ACUTE NON-RECURRENT MAXILLARY SINUSITIS: ICD-10-CM

## 2020-02-14 PROCEDURE — 99214 OFFICE O/P EST MOD 30 MIN: CPT | Mod: S$PBB,,, | Performed by: FAMILY MEDICINE

## 2020-02-14 PROCEDURE — 99999 PR PBB SHADOW E&M-EST. PATIENT-LVL III: CPT | Mod: PBBFAC,,, | Performed by: FAMILY MEDICINE

## 2020-02-14 PROCEDURE — 99214 PR OFFICE/OUTPT VISIT, EST, LEVL IV, 30-39 MIN: ICD-10-PCS | Mod: S$PBB,,, | Performed by: FAMILY MEDICINE

## 2020-02-14 PROCEDURE — 99213 OFFICE O/P EST LOW 20 MIN: CPT | Mod: PBBFAC,PN | Performed by: FAMILY MEDICINE

## 2020-02-14 PROCEDURE — 99999 PR PBB SHADOW E&M-EST. PATIENT-LVL III: ICD-10-PCS | Mod: PBBFAC,,, | Performed by: FAMILY MEDICINE

## 2020-02-14 RX ORDER — HYDROCHLOROTHIAZIDE 25 MG/1
25 TABLET ORAL DAILY
Qty: 7 TABLET | Refills: 0 | Status: SHIPPED | OUTPATIENT
Start: 2020-02-14 | End: 2020-11-02

## 2020-02-14 RX ORDER — AZITHROMYCIN 250 MG/1
TABLET, FILM COATED ORAL
Qty: 6 TABLET | Refills: 0 | Status: SHIPPED | OUTPATIENT
Start: 2020-02-14 | End: 2020-07-17

## 2020-02-14 NOTE — PROGRESS NOTES
"Subjective:       Patient ID: Yaneth Vera is a 69 y.o. female.    Chief Complaint: Follow-up (BW review, Pt would like foot exam )    Cough  Last day  Worsening  Associated with sinus pressure  Nothing makes better or worse  + sick contacts      Review of Systems   Constitutional: Positive for activity change and appetite change. Negative for chills, fatigue and fever.   HENT: Positive for congestion, postnasal drip, rhinorrhea, sinus pressure, sinus pain and sore throat. Negative for dental problem, ear pain, facial swelling, nosebleeds, trouble swallowing and voice change.    Eyes: Negative for pain, discharge and visual disturbance.   Respiratory: Positive for cough and wheezing. Negative for apnea, chest tightness and shortness of breath.    Cardiovascular: Negative for chest pain and palpitations.   Gastrointestinal: Negative for abdominal pain, blood in stool, constipation and nausea.   Endocrine: Negative for cold intolerance, polydipsia and polyuria.   Genitourinary: Negative for difficulty urinating, enuresis and flank pain.   Musculoskeletal: Negative for arthralgias and back pain.   Skin: Negative for color change.   Allergic/Immunologic: Negative for environmental allergies and immunocompromised state.   Neurological: Negative for dizziness and light-headedness.   Hematological: Negative for adenopathy.   Psychiatric/Behavioral: Negative for agitation, behavioral problems, decreased concentration and dysphoric mood. The patient is not nervous/anxious.    All other systems reviewed and are negative.        Reviewed family, medical, surgical, and social history.    Objective:      /84 (BP Location: Left arm, Patient Position: Sitting, BP Method: Large (Automatic))   Pulse 63   Resp 20   Ht 5' 5" (1.651 m)   Wt 114.8 kg (253 lb)   SpO2 96%   BMI 42.10 kg/m²   Physical Exam   Constitutional: She is oriented to person, place, and time. She appears well-developed and well-nourished. No distress. "   HENT:   Head: Normocephalic and atraumatic.   Nose: Nose normal.   Mouth/Throat: Oropharynx is clear and moist. No oropharyngeal exudate.   Eyes: Pupils are equal, round, and reactive to light. Conjunctivae and EOM are normal. No scleral icterus.   Neck: Normal range of motion. Neck supple. No thyromegaly present.   Cardiovascular: Normal rate, regular rhythm and normal heart sounds. Exam reveals no gallop and no friction rub.   No murmur heard.  Pulmonary/Chest: Effort normal and breath sounds normal. No respiratory distress. She has no wheezes. She has no rales. She exhibits no tenderness.   Abdominal: Soft. Bowel sounds are normal. She exhibits no distension. There is no tenderness. There is no guarding.   Musculoskeletal: Normal range of motion. She exhibits no edema, tenderness or deformity.   Lymphadenopathy:     She has no cervical adenopathy.   Neurological: She is alert and oriented to person, place, and time. She displays normal reflexes. No cranial nerve deficit or sensory deficit. She exhibits normal muscle tone.   Skin: Skin is warm and dry. No rash noted. She is not diaphoretic. No erythema. No pallor.   Psychiatric: She has a normal mood and affect. Her behavior is normal. Judgment and thought content normal.   Nursing note and vitals reviewed.      Assessment:       1. Localized edema    2. Acute non-recurrent maxillary sinusitis    3. Prediabetes        Plan:       Localized edema  -     hydroCHLOROthiazide (HYDRODIURIL) 25 MG tablet; Take 1 tablet (25 mg total) by mouth once daily.  Dispense: 7 tablet; Refill: 0    Acute non-recurrent maxillary sinusitis  -     azithromycin (Z-SANTOSH) 250 MG tablet; Take two tablets on day 1, then 1 tablet on days 2-5.  Dispense: 6 tablet; Refill: 0    Prediabetes  -     Hemoglobin A1c; Future; Expected date: 08/14/2020            Risks, benefits, and side effects were discussed with the patient. All questions were answered to the fullest satisfaction of the  patient, and pt verbalized understanding and agreement to treatment plan. Pt was to call with any new or worsening symptoms, or present to the ER.

## 2020-05-20 ENCOUNTER — PATIENT MESSAGE (OUTPATIENT)
Dept: ADMINISTRATIVE | Facility: HOSPITAL | Age: 70
End: 2020-05-20

## 2020-05-28 DIAGNOSIS — Z76.0 ENCOUNTER FOR MEDICATION REFILL: ICD-10-CM

## 2020-05-28 RX ORDER — OMEPRAZOLE 20 MG/1
20 CAPSULE, DELAYED RELEASE ORAL DAILY
Qty: 90 CAPSULE | Refills: 3 | Status: SHIPPED | OUTPATIENT
Start: 2020-05-28 | End: 2021-08-10 | Stop reason: SDUPTHER

## 2020-05-28 NOTE — TELEPHONE ENCOUNTER
----- Message from Dinorah Anna sent at 5/28/2020 10:15 AM CDT -----  Contact: pt  Type:  RX Refill Request    Who Called:  pt  Refill or New Rx: refill  RX Name and Strength: omeprazole (PRILOSEC) 20 MG capsule  How is the patient currently taking it? (ex. 1XDay): 1x day  2Is this a 30 day or 90 day RX: 90  Preferred Pharmacy with phone number:   [facility] - [place], [place] - [address]  [address] [place] [address]  Phone: [phone] Fax: [phone]      Local or Mail Order: local  Ordering Provider:  Dr Otf Perez Call Back Number:  [phone] (home)     Additional Information:  christina

## 2020-05-28 NOTE — TELEPHONE ENCOUNTER
----- Message from Dinorah Anna sent at 5/28/2020 10:15 AM CDT -----  Contact: pt  Type:  RX Refill Request    Who Called:  pt  Refill or New Rx: refill  RX Name and Strength: omeprazole (PRILOSEC) 20 MG capsule  How is the patient currently taking it? (ex. 1XDay): 1x day  2Is this a 30 day or 90 day RX: 90  Preferred Pharmacy with phone number:   [id number] - [place], [place] - [address]  [address] [place] [address]  Phone: [phone] Fax: [phone]      Local or Mail Order: local  Ordering Provider:  Dr Otf Perez Call Back Number:  [phone] (home)     Additional Information:  christina

## 2020-07-17 ENCOUNTER — OFFICE VISIT (OUTPATIENT)
Dept: FAMILY MEDICINE | Facility: CLINIC | Age: 70
End: 2020-07-17
Payer: MEDICARE

## 2020-07-17 VITALS
HEIGHT: 65 IN | OXYGEN SATURATION: 97 % | SYSTOLIC BLOOD PRESSURE: 125 MMHG | WEIGHT: 242 LBS | DIASTOLIC BLOOD PRESSURE: 75 MMHG | HEART RATE: 63 BPM | BODY MASS INDEX: 40.32 KG/M2 | RESPIRATION RATE: 15 BRPM

## 2020-07-17 DIAGNOSIS — R73.9 HYPERGLYCEMIA: ICD-10-CM

## 2020-07-17 DIAGNOSIS — M70.62 TROCHANTERIC BURSITIS OF LEFT HIP: Primary | ICD-10-CM

## 2020-07-17 PROCEDURE — 99214 OFFICE O/P EST MOD 30 MIN: CPT | Mod: S$PBB,,, | Performed by: FAMILY MEDICINE

## 2020-07-17 PROCEDURE — 99999 PR PBB SHADOW E&M-EST. PATIENT-LVL IV: CPT | Mod: PBBFAC,,, | Performed by: FAMILY MEDICINE

## 2020-07-17 PROCEDURE — 99214 PR OFFICE/OUTPT VISIT, EST, LEVL IV, 30-39 MIN: ICD-10-PCS | Mod: S$PBB,,, | Performed by: FAMILY MEDICINE

## 2020-07-17 PROCEDURE — 99999 PR PBB SHADOW E&M-EST. PATIENT-LVL IV: ICD-10-PCS | Mod: PBBFAC,,, | Performed by: FAMILY MEDICINE

## 2020-07-17 PROCEDURE — 99214 OFFICE O/P EST MOD 30 MIN: CPT | Mod: PBBFAC,PN | Performed by: FAMILY MEDICINE

## 2020-07-17 RX ORDER — TRIAMCINOLONE ACETONIDE 1 MG/G
CREAM TOPICAL
COMMUNITY
Start: 2020-04-17

## 2020-07-17 RX ORDER — PREDNISONE 20 MG/1
20 TABLET ORAL 2 TIMES DAILY
Qty: 10 TABLET | Refills: 0 | Status: SHIPPED | OUTPATIENT
Start: 2020-07-17 | End: 2020-07-22

## 2020-07-17 NOTE — PROGRESS NOTES
"Subjective:       Patient ID: Yaneth Vera is a 70 y.o. female.    Chief Complaint: Hip Pain (since march )    L hip pain  Worsening  Since March  Pain with palpation  No fever  No weight loss      Review of Systems   Constitutional: Negative for activity change, appetite change, chills, fatigue and fever.   HENT: Negative for congestion, dental problem, facial swelling, nosebleeds, postnasal drip, sinus pain, sore throat, trouble swallowing and voice change.    Eyes: Negative for pain, discharge and visual disturbance.   Respiratory: Negative for apnea, cough, chest tightness and shortness of breath.    Cardiovascular: Negative for chest pain and palpitations.   Gastrointestinal: Negative for abdominal pain, blood in stool, constipation and nausea.   Endocrine: Negative for cold intolerance, polydipsia and polyuria.   Genitourinary: Negative for difficulty urinating, enuresis and flank pain.   Musculoskeletal: Negative for arthralgias and back pain.   Skin: Negative for color change.   Allergic/Immunologic: Negative for environmental allergies and immunocompromised state.   Neurological: Negative for dizziness and light-headedness.   Hematological: Negative for adenopathy.   Psychiatric/Behavioral: Negative for agitation, behavioral problems, decreased concentration and dysphoric mood. The patient is not nervous/anxious.    All other systems reviewed and are negative.        Reviewed family, medical, surgical, and social history.    Objective:      /75 Comment: home reading  Pulse 63   Resp 15   Ht 5' 5" (1.651 m)   Wt 109.8 kg (242 lb)   SpO2 97%   BMI 40.27 kg/m²   Physical Exam  Vitals signs and nursing note reviewed.   Constitutional:       General: She is not in acute distress.     Appearance: She is well-developed. She is not diaphoretic.   HENT:      Head: Normocephalic and atraumatic.      Nose: Nose normal.      Mouth/Throat:      Pharynx: No oropharyngeal exudate.   Eyes:      General: No " scleral icterus.     Conjunctiva/sclera: Conjunctivae normal.      Pupils: Pupils are equal, round, and reactive to light.   Neck:      Musculoskeletal: Normal range of motion and neck supple.      Thyroid: No thyromegaly.   Cardiovascular:      Rate and Rhythm: Normal rate and regular rhythm.      Heart sounds: Normal heart sounds. No murmur. No friction rub. No gallop.    Pulmonary:      Effort: Pulmonary effort is normal. No respiratory distress.      Breath sounds: Normal breath sounds. No wheezing or rales.   Chest:      Chest wall: No tenderness.   Abdominal:      General: Bowel sounds are normal. There is no distension.      Palpations: Abdomen is soft.      Tenderness: There is no abdominal tenderness. There is no guarding.   Musculoskeletal: Normal range of motion.         General: No tenderness or deformity.   Lymphadenopathy:      Cervical: No cervical adenopathy.   Skin:     General: Skin is warm and dry.      Coloration: Skin is not pale.      Findings: No erythema or rash.   Neurological:      Mental Status: She is alert and oriented to person, place, and time.      Cranial Nerves: No cranial nerve deficit.      Sensory: No sensory deficit.      Motor: No abnormal muscle tone.      Deep Tendon Reflexes: Reflexes normal.   Psychiatric:         Behavior: Behavior normal.         Thought Content: Thought content normal.         Judgment: Judgment normal.         Assessment:       1. Trochanteric bursitis of left hip    2. Hyperglycemia        Plan:       Trochanteric bursitis of left hip  -     predniSONE (DELTASONE) 20 MG tablet; Take 1 tablet (20 mg total) by mouth 2 (two) times daily. for 5 days  Dispense: 10 tablet; Refill: 0    Hyperglycemia  -     Hemoglobin A1C; Future; Expected date: 07/17/2020            Risks, benefits, and side effects were discussed with the patient. All questions were answered to the fullest satisfaction of the patient, and pt verbalized understanding and agreement to  treatment plan. Pt was to call with any new or worsening symptoms, or present to the ER.

## 2020-07-24 ENCOUNTER — TELEPHONE (OUTPATIENT)
Dept: FAMILY MEDICINE | Facility: CLINIC | Age: 70
End: 2020-07-24

## 2020-07-24 DIAGNOSIS — E78.49 OTHER HYPERLIPIDEMIA: Primary | ICD-10-CM

## 2020-07-24 NOTE — TELEPHONE ENCOUNTER
Please advise    Thank you    ----- Message from Ralph Ventura sent at 7/24/2020  2:40 PM CDT -----  Type: Needs Medical Advice  Who Called:  Patient    Best Call Back Number: [phone]  Additional Information: Patient states that she would like a callback regarding extending her Lipid panel orders so she can schedule labs in late October

## 2020-08-07 ENCOUNTER — TELEPHONE (OUTPATIENT)
Dept: ORTHOPEDICS | Facility: CLINIC | Age: 70
End: 2020-08-07

## 2020-08-07 ENCOUNTER — TELEPHONE (OUTPATIENT)
Dept: FAMILY MEDICINE | Facility: CLINIC | Age: 70
End: 2020-08-07

## 2020-08-07 NOTE — TELEPHONE ENCOUNTER
----- Message from Xena Perez sent at 8/7/2020  3:21 PM CDT -----  Type: Calling with information    Who Called:  Patient  Best Call Back Number: [phone]  Additional Information:  Patient stated she had mammogram done on January 15th,2020/does not need new order/if any questions call back.

## 2020-09-03 ENCOUNTER — TELEPHONE (OUTPATIENT)
Dept: ORTHOPEDICS | Facility: CLINIC | Age: 70
End: 2020-09-03

## 2020-09-03 NOTE — TELEPHONE ENCOUNTER
Returned call to patient. Patient stated she was on vacation and did a lot of walking, that is when the pain started. Patient is having pain under her buttocks and causing pain in her back. Informed patient that Dr. Craig does not treat back. Patient would like him to evaluate her. Patient has appointment for tomorrow.

## 2020-09-03 NOTE — TELEPHONE ENCOUNTER
----- Message from Carlos Boyer sent at 9/3/2020 11:16 AM CDT -----  Regarding: returned call, try again  Contact: pt

## 2020-09-04 ENCOUNTER — HOSPITAL ENCOUNTER (OUTPATIENT)
Dept: RADIOLOGY | Facility: HOSPITAL | Age: 70
Discharge: HOME | End: 2020-09-04
Attending: ORTHOPAEDIC SURGERY
Payer: MEDICARE

## 2020-09-04 ENCOUNTER — PATIENT OUTREACH (OUTPATIENT)
Dept: ADMINISTRATIVE | Facility: OTHER | Age: 70
End: 2020-09-04

## 2020-09-04 ENCOUNTER — OFFICE VISIT (OUTPATIENT)
Dept: ORTHOPEDICS | Facility: CLINIC | Age: 70
End: 2020-09-04
Payer: MEDICARE

## 2020-09-04 VITALS
BODY MASS INDEX: 40.32 KG/M2 | TEMPERATURE: 98 F | HEART RATE: 68 BPM | HEIGHT: 65 IN | WEIGHT: 242 LBS | OXYGEN SATURATION: 95 %

## 2020-09-04 DIAGNOSIS — M47.816 OSTEOARTHRITIS OF LUMBAR SPINE, UNSPECIFIED SPINAL OSTEOARTHRITIS COMPLICATION STATUS: ICD-10-CM

## 2020-09-04 DIAGNOSIS — M25.552 PAIN OF BOTH HIP JOINTS: ICD-10-CM

## 2020-09-04 DIAGNOSIS — M25.551 PAIN OF BOTH HIP JOINTS: ICD-10-CM

## 2020-09-04 DIAGNOSIS — G57.02 PIRIFORMIS SYNDROME, LEFT: Primary | ICD-10-CM

## 2020-09-04 DIAGNOSIS — M25.551 PAIN OF BOTH HIP JOINTS: Primary | ICD-10-CM

## 2020-09-04 DIAGNOSIS — M25.559 ARTHRALGIA OF HIP, UNSPECIFIED LATERALITY: ICD-10-CM

## 2020-09-04 DIAGNOSIS — M25.552 PAIN OF BOTH HIP JOINTS: Primary | ICD-10-CM

## 2020-09-04 PROCEDURE — 73521 X-RAY EXAM HIPS BI 2 VIEWS: CPT | Mod: TC,FY

## 2020-09-04 PROCEDURE — 99203 PR OFFICE/OUTPT VISIT, NEW, LEVL III, 30-44 MIN: ICD-10-PCS | Mod: S$PBB,,, | Performed by: ORTHOPAEDIC SURGERY

## 2020-09-04 PROCEDURE — 99999 PR PBB SHADOW E&M-EST. PATIENT-LVL IV: CPT | Mod: PBBFAC,,, | Performed by: ORTHOPAEDIC SURGERY

## 2020-09-04 PROCEDURE — 73521 X-RAY EXAM HIPS BI 2 VIEWS: CPT | Mod: 26,,, | Performed by: RADIOLOGY

## 2020-09-04 PROCEDURE — 99999 PR PBB SHADOW E&M-EST. PATIENT-LVL IV: ICD-10-PCS | Mod: PBBFAC,,, | Performed by: ORTHOPAEDIC SURGERY

## 2020-09-04 PROCEDURE — 73521 XR HIPS BILATERAL 2 VIEW INCL AP PELVIS: ICD-10-PCS | Mod: 26,,, | Performed by: RADIOLOGY

## 2020-09-04 PROCEDURE — 99214 OFFICE O/P EST MOD 30 MIN: CPT | Mod: PBBFAC,25 | Performed by: ORTHOPAEDIC SURGERY

## 2020-09-04 PROCEDURE — 99203 OFFICE O/P NEW LOW 30 MIN: CPT | Mod: S$PBB,,, | Performed by: ORTHOPAEDIC SURGERY

## 2020-09-04 NOTE — LETTER
September 4, 2020      Cesar Vanessa MD  [address]  [place] [address]           [facility]  [address] [place] [address]  Phone: [phone]  Fax: [phone]          Patient: Yaneth Vera   MR Number: [id number]   YOB: 1950   Date of Visit: 9/4/2020       Dear Dr. Cesar Vanessa:    Thank you for referring Yaneth Vera to me for evaluation. Attached you will find relevant portions of my assessment and plan of care.    If you have questions, please do not hesitate to call me. I look forward to following Yaneth Vera along with you.    Sincerely,    Rupesh Craig, DO    Enclosure  CC:  No Recipients    If you would like to receive this communication electronically, please contact [email or web address] or [phone] to request more information on [employer] Link access.    For providers and/or their staff who would like to refer a patient to [facility], please contact us through our one-stop-shop provider referral line, [facility] , at [phone].    If you feel you have received this communication in error or would no longer like to receive these types of communications, please e-mail [email or web address]

## 2020-09-04 NOTE — PROGRESS NOTES
Health Maintenance Due   Topic Date Due    Hepatitis C Screening  1950    Foot Exam  06/18/1961    Shingles Vaccine (1 of 2) 06/18/2001    Hemoglobin A1c  07/30/2020    Influenza Vaccine (1) 08/01/2020     Updates were requested from care everywhere.  Chart was reviewed for overdue Proactive [facility] Encounters (PATRICIA) topics (CRS, Breast Cancer Screening, Eye exam)  Health Maintenance has been updated.  LINKS immunization registry triggered.  Immunizations were not able to be reconciled. Patient not found in LINKS.  HgA1c scheduled to be completed 10/26/2020

## 2020-09-04 NOTE — PROGRESS NOTES
Subjective:      Patient ID: Yaneth Vera is a 70 y.o. female.    Chief Complaint: Pain of the Right Hip and Pain of the Left Hip    Referring Provider: Cesar Vanessa Md  [address]  [place] [address]    HPI:  Ms. Vera is a 70-year-old lady who presented today for evaluation of constant pain her left buttocks which has been going on since February 2020.  Per the patient she was doing a lot of hiking in [place] and after walking a lot had increasing pain in her left buttocks.  She was treated with oral prednisone in July 2020 which did give her some relief of her symptoms.  Sitting increases her symptoms while lying down decreases them.  She stated that she feels her symptoms increase at night.  Her pain radiates from her buttocks into her waist band.  She has not taken NSAIDs, done physical therapy, nor had injections.    Past Medical History:   Diagnosis Date     *  *  * Basal cell carcinoma of her nose  GERD  Nephrolithiasis  Prediabetes      Past Surgical History:   Procedure Laterality Date     *  * TUBAL LIGATION  COLONOSCOPY  NOSE MOHS         Review of patient's allergies indicates:   Allergen Reactions    Glimepiride     Metformin        Social History     Occupational History    Retired  with the [employer]   Tobacco Use    Smoking status: Never Smoker    Smokeless tobacco: Never Used   Substance and Sexual Activity    Alcohol use: Yes    Drug use: No    Sexual activity: Heterosexual      Family History   Problem Relation Age of Onset    Cancer Maternal Aunt        Previous Hospitalizations:  Denied previous hospitalizations.    ROS:   Review of Systems   Constitution: Negative for diaphoresis and fever.   HENT: Negative for ear pain, hearing loss, nosebleeds and tinnitus.    Eyes: Negative for pain, redness and visual disturbance.   Cardiovascular: Negative for chest pain and palpitations.   Respiratory: Negative for cough and shortness of breath.     Skin: Negative for itching and rash.   Musculoskeletal: Positive for back pain, joint swelling, myalgias and stiffness.   Gastrointestinal: Negative for constipation, diarrhea, nausea and vomiting.   Genitourinary: Negative for dysuria, frequency and hematuria.   Neurological: Negative for dizziness, headaches, numbness, seizures and weakness.   Psychiatric/Behavioral: The patient is not nervous/anxious.    Allergic/Immunologic: Negative for environmental allergies.           Objective:      Physical Exam:   General: AAOx3.  No acute distress  HEENT: Normocephalic, PEARLA EOMI, Good Dentition  Neck: Supple, No JVD  Chest: Symetric, equal excursion on inspiration  Abdomen: Soft NTND  Vascular:  Pulses intact and equal bilaterally.  Capillary refill less than 3 seconds and equal bilaterally  Neurologic:  Pinprick and soft touch intact and equal bilaterally  Integment:  No ecchymosis, no errythema  Extremity:  Hip/pelvis:  Flexion/extension both hips equal greater than 95°/10°.  Internal/external rotation equal bilaterally.  Isaiah/fabere/logroll/push-pull negative both hips.  Nontender over the greater trochanter bilaterally. Henrique's negative bilaterally.  Tender with palpation piriformis fossa left hip.  Pelvis stable with rock/compression.                      Lumbosacral spine:  Forward flexion/backward flexion 45/10 degrees.  Right/left rotation 45/45 degrees.  Right/left side bending 25/25 degrees.  Nontender with spine motion.  Relatively nontender with spine palpation.  Negative straight leg raising bilaterally.  Radiography:  Personally reviewed x-rays which include AP pelvis and bilateral hips completed on 09/04/2020 showed lumbosacral arthritis no fracture dislocation.      Assessment:       Impression:      1. Piriformis syndrome, left    2. Osteoarthritis of lumbar spine, unspecified spinal osteoarthritis complication status    3. Arthralgia of hip, unspecified laterality          Plan:       1.   Discussed physical examination and radiographic findings with the patient. 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  Treatment alternatives and outcomes were discussed with the patient.  She understands she could trial conservative management which would include NSAIDs, physical therapy, home exercises, activity modification, injections, or surgical intervention.  Recommend she trial conservative measures and then if she fails conservative measures then consider surgery.  2.  Refer to pain management to do piriformis injections.  3.  Home stretching exercise program was shown discussed with the patient.  4.  Offered referred to physical therapy she declined.  5.  Apply Voltaren gel to affected area twice daily and massage in for 2 min.  The patient states she had some at home.  6.  Recommend she take NSAIDs but discuss it with her PCM.  If she is not a fan of NSAIDs she could also treat with her Curcumin or tumeric to get some anti-inflammatory affect.  7.  Follow up p.r.n.        Answers for HPI/ROS submitted by the patient on 9/3/2020   Hip pain  unexpected weight change: No  appetite change : No  sleep disturbance: No  IMMUNOCOMPROMISED: No  dysphoric mood: No  sinus pressure : No  food allergies: No  difficulty urinating: No  painful intercourse: No  blood in stool: No  Pain Chronicity: chronic  History of trauma: No  Onset: more than 1 month ago  Frequency: constantly  Progression since onset: unchanged  Injury mechanism: pulling  injury location: at home  pain- numeric: 5/10  pain location: left hip, other  pain quality: aching  Radiating Pain: Yes  If your pain is radiating, to what part of the body?: lower back, mid back  Aggravating factors: bending, bearing weight, exercise, extension, standing, sitting  inability to bear weight: Yes  joint locking: No  limited range of motion: Yes  tingling: No  Treatments tried: movement, other  physical  therapy: not tried  Improvement on treatment: mild

## 2020-09-22 ENCOUNTER — OFFICE VISIT (OUTPATIENT)
Dept: PAIN MEDICINE | Facility: CLINIC | Age: 70
End: 2020-09-22
Payer: MEDICARE

## 2020-09-22 VITALS
DIASTOLIC BLOOD PRESSURE: 66 MMHG | HEIGHT: 65 IN | BODY MASS INDEX: 40.33 KG/M2 | WEIGHT: 242.06 LBS | SYSTOLIC BLOOD PRESSURE: 158 MMHG | HEART RATE: 65 BPM

## 2020-09-22 DIAGNOSIS — M25.559 ARTHRALGIA OF HIP, UNSPECIFIED LATERALITY: ICD-10-CM

## 2020-09-22 DIAGNOSIS — G57.02 PIRIFORMIS SYNDROME OF LEFT SIDE: Primary | ICD-10-CM

## 2020-09-22 PROCEDURE — 99204 PR OFFICE/OUTPT VISIT, NEW, LEVL IV, 45-59 MIN: ICD-10-PCS | Mod: S$PBB,,, | Performed by: ANESTHESIOLOGY

## 2020-09-22 PROCEDURE — 99999 PR PBB SHADOW E&M-EST. PATIENT-LVL IV: ICD-10-PCS | Mod: PBBFAC,,, | Performed by: ANESTHESIOLOGY

## 2020-09-22 PROCEDURE — 99999 PR PBB SHADOW E&M-EST. PATIENT-LVL IV: CPT | Mod: PBBFAC,,, | Performed by: ANESTHESIOLOGY

## 2020-09-22 PROCEDURE — 99204 OFFICE O/P NEW MOD 45 MIN: CPT | Mod: S$PBB,,, | Performed by: ANESTHESIOLOGY

## 2020-09-22 PROCEDURE — 99214 OFFICE O/P EST MOD 30 MIN: CPT | Mod: PBBFAC,PN | Performed by: ANESTHESIOLOGY

## 2020-09-22 RX ORDER — CELECOXIB 200 MG/1
200 CAPSULE ORAL 2 TIMES DAILY
Qty: 60 CAPSULE | Refills: 0 | Status: SHIPPED | OUTPATIENT
Start: 2020-09-22 | End: 2020-11-02

## 2020-09-22 NOTE — PROGRESS NOTES
Referring Physician: Rupesh Craig DO    PCP: Cesar Vanessa MD    CC: left buttock pain    HPI:   Yaneth Vera is a 70 y.o. female with PMH significant for GERD presents as a referral for the evaluation of left buttock pain. The patient reports that her pain began approximately February 2020 while she was in [place] and did a lot of walking. The patient has not had pain like this in the past. The patient localizes her pain to left buttock. The patient reports of radiation up to her waist band and back. The patient describes her pain as a constant, cramping pain. The patient denies of numbness. The patient reports that her pain is a 4/10. The patient can increase to a 8/10 at its worst. Patient denies of any urinary/fecal incontinence, saddle anesthesia, or weakness.     Aggravating factors: climbing stairs, bending    Mitigating factors: prednisone    Relevant Surgeries: no    Interventional Therapies: no    : Not applicable    Non-pharmacologic Treatment:     · Physical Therapy: no; attempted home stretches without benefit. Does go to the health club regularly to exercise  · Ice/Heat: no  · TENS: no  · Massage: no  · Chiropractic care: no  · Acupuncture: no         Pain Medications:         · Currently taking: N/A    · Has tried in the past:    · Opioids: no  · NSAIDS: no  · Tylenol: no  · Muscle relaxants: no  · TCAs: no  · SNRIs: no  · Anticonvulsants: no  · topical creams: yes; voltaren gel - no benefit   · Other: prednisone with benefit in the past    Anticoagulation: no    ROS:  Review of Systems   Constitutional: Negative for chills and fever.   HENT: Negative for sore throat.    Eyes: Negative for visual disturbance.   Respiratory: Negative for shortness of breath.    Cardiovascular: Negative for chest pain.   Gastrointestinal: Negative for nausea and vomiting.   Genitourinary: Negative for difficulty urinating.   Musculoskeletal: Positive for arthralgias and back pain.   Skin: Negative for rash.  "  Allergic/Immunologic: Negative for immunocompromised state.   Neurological: Negative for syncope.   Hematological: Does not bruise/bleed easily.   Psychiatric/Behavioral: Negative for suicidal ideas.        Past Medical History:   Diagnosis Date    Cancer      Past Surgical History:   Procedure Laterality Date    TUBAL LIGATION       Family History   Problem Relation Age of Onset    Cancer Maternal Aunt      Social History     Socioeconomic History    Marital status:      Spouse name: Not on file    Number of children: Not on file    Years of education: Not on file    Highest education level: Not on file   Occupational History    Not on file   Social Needs    Financial resource strain: Not on file    Food insecurity     Worry: Not on file     Inability: Not on file    Transportation needs     Medical: Not on file     Non-medical: Not on file   Tobacco Use    Smoking status: Never Smoker    Smokeless tobacco: Never Used   Substance and Sexual Activity    Alcohol use: Yes    Drug use: No    Sexual activity: Not on file   Lifestyle    Physical activity     Days per week: Not on file     Minutes per session: Not on file    Stress: Not on file   Relationships    Social connections     Talks on phone: Not on file     Gets together: Not on file     Attends Anglican service: Not on file     Active member of club or organization: Not on file     Attends meetings of clubs or organizations: Not on file     Relationship status: Not on file   Other Topics Concern    Not on file   Social History Narrative    Not on file         Allergies: See med card    Vitals:    09/22/20 1251   BP: (!) 158/66   Pulse: 65   Weight: 109.8 kg (242 lb 1 oz)   Height: 5' 5" (1.651 m)   PainSc:   4   PainLoc: Hip         Physical exam:  Physical Exam   Constitutional: She is oriented to person, place, and time and well-developed, well-nourished, and in no distress.   HENT:   Head: Normocephalic and atraumatic.   Eyes: " Conjunctivae and EOM are normal. Right eye exhibits no discharge. Left eye exhibits no discharge.   Cardiovascular: Normal rate.   Pulmonary/Chest: Effort normal and breath sounds normal. No respiratory distress.   Abdominal: Soft.   Neurological: She is alert and oriented to person, place, and time.   Skin: Skin is warm and dry. No rash noted. She is not diaphoretic.   Psychiatric: Mood, memory, affect and judgment normal.   Nursing note and vitals reviewed.       UPPER EXTREMITIES: Normal alignment, normal range of motion, no atrophy, no skin changes,  hair growth and nail growth normal and equal bilaterally. No swelling, no tenderness.    LOWER EXTREMITIES:  Normal alignment, normal range of motion, no atrophy, no skin changes,  hair growth and nail growth normal and equal bilaterally. No swelling, no tenderness.    LUMBAR SPINE  Lumbar spine: ROM is full with flexion extension and oblique extension with no increased pain.     ((--)) Supine straight leg raise    ((--)) Facet loading   Internal and external rotation of the hip causes no increased pain on either side. TTP at the site of the left greater trochanter  Myofascial exam: Tenderness to palpation across lumbar paraspinous muscles.    Piriformis stretch (sitting): positive on the left side    ((+)) TTP at the SI joint on the elft  ((--)) LUCI's test  ((--)) One leg stand    ((--)) Distraction test    CRANIAL NERVES:  II:  PERRL bilaterally,   III,IV,VI: EOMI.    V:  Facial sensation equal bilaterally  VII:  Facial motor function normal.  VIII:  Hearing equal to finger rub bilaterally  IX/X: Gag normal, palate symmetric  XI:  Shoulder shrug equal, head turn equal  XII:  Tongue midline without fasciculations      MOTOR: Tone and bulk: normal bilateral upper and lower Strength: normal   Delt Bi Tri WE WF     R 5 5 5 5 5 5   L 5 5 5 5 5 5     IP ADD ABD Quad TA Gas HAM  R 5 5 5 5 5 5 5  L 5 5 5 5 5 5 5    SENSATION: Light touch and pinprick intact  bilaterally  REFLEXES: normal, symmetric, nonbrisk.  Toes down, no clonus. Negative ross's sign bilaterally.  GAIT: normal rise, base, steps, and arm swing.      Imaging:  X-ray bilateral hips (9/4/2020):  Mild degenerative changes at the hips    Assessment: Yaneth Vera is a 70 y.o. female with PMH significant for GERD presents as a referral for the evaluation of left buttock pain. Physical examination significant for reproducible pain with piriformis stretch. Lumbar spine examination was benign and I was unable to reproduce her pain with SI joint provocation. Treatment plan outlined below.     Plan:  - Prescribed Celecoxib 200 mg PO BID for inflammation and pain  - External PT referral provided for piriformis syndrome  - I have stressed the importance of physical activity and a home exercise plan to help with chronic pain and improve health.  - Can consider piriformis injection in the future if her pain does not improve  - RTC in 4 weeks for follow-up    Thank you for referring this interesting patient, and I look forward to continuing to collaborate in her care.    Leonila Norman MD  Pain Management

## 2020-09-22 NOTE — LETTER
September 22, 2020      Rupesh Craig,   [address]  [place] [address]           [facility]  [address] [place] [address] [address]  [place] [address]  Phone: [phone]  Fax: [phone]          Patient: Yaneth Vera   MR Number: [id number]   YOB: 1950   Date of Visit: 9/22/2020       Dear Dr. Rupesh Craig:    Thank you for referring Yaneth Vera to me for evaluation. Attached you will find relevant portions of my assessment and plan of care.    If you have questions, please do not hesitate to call me. I look forward to following Yaneth Vera along with you.    Sincerely,    Leonlia Norman MD    Enclosure  CC:  No Recipients    If you would like to receive this communication electronically, please contact [email or web address] or [phone] to request more information on [employer] Link access.    For providers and/or their staff who would like to refer a patient to [facility], please contact us through our one-stop-shop provider referral line, [facility], at [phone].    If you feel you have received this communication in error or would no longer like to receive these types of communications, please e-mail [email or web address]

## 2020-10-26 ENCOUNTER — LAB VISIT (OUTPATIENT)
Dept: LAB | Facility: HOSPITAL | Age: 70
End: 2020-10-26
Attending: FAMILY MEDICINE
Payer: MEDICARE

## 2020-10-26 DIAGNOSIS — E78.49 OTHER HYPERLIPIDEMIA: ICD-10-CM

## 2020-10-26 DIAGNOSIS — R73.9 HYPERGLYCEMIA: ICD-10-CM

## 2020-10-26 LAB
CHOLEST SERPL-MCNC: 195 MG/DL (ref 120–199)
CHOLEST/HDLC SERPL: 3.5 {RATIO} (ref 2–5)
ESTIMATED AVG GLUCOSE: 134 MG/DL (ref 68–131)
HBA1C MFR BLD HPLC: 6.3 % (ref 4.5–6.2)
HDLC SERPL-MCNC: 56 MG/DL (ref 40–75)
HDLC SERPL: 28.7 % (ref 20–50)
LDLC SERPL CALC-MCNC: 120.8 MG/DL (ref 63–159)
NONHDLC SERPL-MCNC: 139 MG/DL
TRIGL SERPL-MCNC: 91 MG/DL (ref 30–150)

## 2020-10-26 PROCEDURE — 83036 HEMOGLOBIN GLYCOSYLATED A1C: CPT

## 2020-10-26 PROCEDURE — 36415 COLL VENOUS BLD VENIPUNCTURE: CPT

## 2020-10-26 PROCEDURE — 80061 LIPID PANEL: CPT

## 2020-10-30 ENCOUNTER — PATIENT MESSAGE (OUTPATIENT)
Dept: ADMINISTRATIVE | Facility: HOSPITAL | Age: 70
End: 2020-10-30

## 2020-11-02 ENCOUNTER — TELEPHONE (OUTPATIENT)
Dept: PAIN MEDICINE | Facility: CLINIC | Age: 70
End: 2020-11-02

## 2020-11-02 ENCOUNTER — OFFICE VISIT (OUTPATIENT)
Dept: FAMILY MEDICINE | Facility: CLINIC | Age: 70
End: 2020-11-02
Payer: MEDICARE

## 2020-11-02 VITALS
HEART RATE: 59 BPM | WEIGHT: 248.63 LBS | HEIGHT: 65 IN | BODY MASS INDEX: 41.43 KG/M2 | DIASTOLIC BLOOD PRESSURE: 56 MMHG | TEMPERATURE: 97 F | SYSTOLIC BLOOD PRESSURE: 136 MMHG | RESPIRATION RATE: 20 BRPM | OXYGEN SATURATION: 96 %

## 2020-11-02 DIAGNOSIS — Z23 NEED FOR VACCINATION: ICD-10-CM

## 2020-11-02 DIAGNOSIS — Z00.00 ANNUAL PHYSICAL EXAM: Primary | ICD-10-CM

## 2020-11-02 DIAGNOSIS — Z12.11 COLON CANCER SCREENING: ICD-10-CM

## 2020-11-02 PROCEDURE — 99999 PR PBB SHADOW E&M-EST. PATIENT-LVL III: CPT | Mod: PBBFAC,,, | Performed by: FAMILY MEDICINE

## 2020-11-02 PROCEDURE — 99213 OFFICE O/P EST LOW 20 MIN: CPT | Mod: PBBFAC,PN | Performed by: FAMILY MEDICINE

## 2020-11-02 PROCEDURE — 99999 PR PBB SHADOW E&M-EST. PATIENT-LVL III: ICD-10-PCS | Mod: PBBFAC,,, | Performed by: FAMILY MEDICINE

## 2020-11-02 PROCEDURE — 99397 PER PM REEVAL EST PAT 65+ YR: CPT | Mod: S$PBB,,, | Performed by: FAMILY MEDICINE

## 2020-11-02 PROCEDURE — 99397 PR PREVENTIVE VISIT,EST,65 & OVER: ICD-10-PCS | Mod: S$PBB,,, | Performed by: FAMILY MEDICINE

## 2020-11-02 PROCEDURE — G0009 ADMIN PNEUMOCOCCAL VACCINE: HCPCS | Mod: PBBFAC,PN

## 2020-11-02 NOTE — TELEPHONE ENCOUNTER
----- Message from Sharonda Herring MA sent at 11/2/2020  4:17 PM CST -----  Type:  Patient Returning Call    Who Called:  Yaneth  Who Left Message for Patient:  Sonja  Does the patient know what this is regarding?:  appointment  Best Call Back Number:  [phone]  Additional Information:

## 2020-11-02 NOTE — PROGRESS NOTES
"Subjective:       Patient ID: Yaneth Vera is a 70 y.o. female.    Chief Complaint: Annual Exam (BW review, pt given fitkit)    Patient reports they are feeling well without complaints today. Pt reports adherence to generally healthy diet, exercise plan, and good sleep schedule. Anticipatory guidance was provided. Wellness labs and procedures for age were discussed.      Review of Systems   Constitutional: Negative for activity change, appetite change, chills, fatigue and fever.   HENT: Negative for congestion, dental problem, facial swelling, nosebleeds, postnasal drip, sinus pain, sore throat, trouble swallowing and voice change.    Eyes: Negative for pain, discharge and visual disturbance.   Respiratory: Negative for apnea, cough, chest tightness and shortness of breath.    Cardiovascular: Negative for chest pain and palpitations.   Gastrointestinal: Negative for abdominal pain, blood in stool, constipation and nausea.   Endocrine: Negative for cold intolerance, polydipsia and polyuria.   Genitourinary: Negative for difficulty urinating, enuresis and flank pain.   Musculoskeletal: Negative for arthralgias and back pain.   Skin: Negative for color change.   Allergic/Immunologic: Negative for environmental allergies and immunocompromised state.   Neurological: Negative for dizziness and light-headedness.   Hematological: Negative for adenopathy.   Psychiatric/Behavioral: Negative for agitation, behavioral problems, decreased concentration and dysphoric mood. The patient is not nervous/anxious.    All other systems reviewed and are negative.        Reviewed family, medical, surgical, and social history.    Objective:      BP (!) 136/56 (BP Location: Left arm, Patient Position: Sitting, BP Method: Large (Automatic))   Pulse (!) 59   Temp 97.1 °F (36.2 °C) (Oral)   Resp 20   Ht 5' 5" (1.651 m)   Wt 112.8 kg (248 lb 9.6 oz)   SpO2 96%   BMI 41.37 kg/m²   Physical Exam    Assessment:       1. Annual physical exam "    2. Colon cancer screening    3. Need for vaccination        Plan:       Annual physical exam    Colon cancer screening  -     Fecal Immunochemical Test (iFOBT); Future; Expected date: 11/02/2020    Need for vaccination  -     Pneumococcal Conjugate Vaccine (13 Valent) (IM)            Risks, benefits, and side effects were discussed with the patient. All questions were answered to the fullest satisfaction of the patient, and pt verbalized understanding and agreement to treatment plan. Pt was to call with any new or worsening symptoms, or present to the ER.

## 2020-11-03 ENCOUNTER — TELEPHONE (OUTPATIENT)
Dept: PAIN MEDICINE | Facility: CLINIC | Age: 70
End: 2020-11-03

## 2020-11-03 NOTE — TELEPHONE ENCOUNTER
Spoke with pt to rescheduled appt to NP schedule on 11/5. Did not want to see NP. Scheduled for 11/18

## 2020-11-16 ENCOUNTER — TELEPHONE (OUTPATIENT)
Dept: PAIN MEDICINE | Facility: CLINIC | Age: 70
End: 2020-11-16

## 2020-11-16 NOTE — TELEPHONE ENCOUNTER
----- Message from Dinorah Anna sent at 11/16/2020  9:18 AM CST -----  Regarding: MRN:  Type: Needs Medical Advice  Who Called:  pt  Symptoms (please be specific):    How long has patient had these symptoms:    Pharmacy name and phone #:    Best Call Back Number: [phone] (home)     Additional Information:

## 2020-12-07 ENCOUNTER — PATIENT OUTREACH (OUTPATIENT)
Dept: ADMINISTRATIVE | Facility: OTHER | Age: 70
End: 2020-12-07

## 2020-12-07 NOTE — PROGRESS NOTES
Chart was reviewed for overdue Proactive [facility] Encounters (PATRICIA)  topics  Updates were requested from care everywhere  Health Maintenance has been updated  LINKS immunization registry triggered

## 2020-12-09 ENCOUNTER — OFFICE VISIT (OUTPATIENT)
Dept: PAIN MEDICINE | Facility: CLINIC | Age: 70
End: 2020-12-09
Payer: MEDICARE

## 2020-12-09 VITALS
SYSTOLIC BLOOD PRESSURE: 143 MMHG | TEMPERATURE: 99 F | HEART RATE: 60 BPM | WEIGHT: 248 LBS | RESPIRATION RATE: 19 BRPM | DIASTOLIC BLOOD PRESSURE: 78 MMHG | HEIGHT: 65 IN | BODY MASS INDEX: 41.32 KG/M2 | OXYGEN SATURATION: 98 %

## 2020-12-09 DIAGNOSIS — M53.3 SACROILIAC JOINT DYSFUNCTION OF LEFT SIDE: ICD-10-CM

## 2020-12-09 DIAGNOSIS — G57.02 PIRIFORMIS SYNDROME OF LEFT SIDE: Primary | ICD-10-CM

## 2020-12-09 PROCEDURE — 99214 OFFICE O/P EST MOD 30 MIN: CPT | Mod: PBBFAC,PO | Performed by: ANESTHESIOLOGY

## 2020-12-09 PROCEDURE — 99999 PR PBB SHADOW E&M-EST. PATIENT-LVL IV: CPT | Mod: PBBFAC,,, | Performed by: ANESTHESIOLOGY

## 2020-12-09 PROCEDURE — 99214 OFFICE O/P EST MOD 30 MIN: CPT | Mod: S$PBB,,, | Performed by: ANESTHESIOLOGY

## 2020-12-09 PROCEDURE — 99214 PR OFFICE/OUTPT VISIT, EST, LEVL IV, 30-39 MIN: ICD-10-PCS | Mod: S$PBB,,, | Performed by: ANESTHESIOLOGY

## 2020-12-09 PROCEDURE — 99999 PR PBB SHADOW E&M-EST. PATIENT-LVL IV: ICD-10-PCS | Mod: PBBFAC,,, | Performed by: ANESTHESIOLOGY

## 2020-12-09 RX ORDER — CELECOXIB 200 MG/1
200 CAPSULE ORAL 2 TIMES DAILY
Qty: 60 CAPSULE | Refills: 0 | Status: SHIPPED | OUTPATIENT
Start: 2020-12-09 | End: 2021-12-08

## 2020-12-09 RX ORDER — METHYLPREDNISOLONE 4 MG/1
TABLET ORAL
Qty: 1 PACKAGE | Refills: 0 | Status: SHIPPED | OUTPATIENT
Start: 2020-12-09 | End: 2020-12-30

## 2020-12-09 NOTE — PROGRESS NOTES
FOLLOW UP NOTE:     CHIEF COMPLAINT: left buttock    INITIAL HISTORY OF PRESENT ILLNESS: Yaneth Vera is a 70 y.o. female with PMH significant for GERD presents as a referral for the evaluation of left buttock pain. The patient reports that her pain began approximately February 2020 while she was in [place] and did a lot of walking. The patient has not had pain like this in the past. The patient localizes her pain to left buttock. The patient reports of radiation up to her waist band and back. The patient describes her pain as a constant, cramping pain. The patient denies of numbness. The patient reports that her pain is a 4/10. The patient can increase to a 8/10 at its worst. Patient denies of any urinary/fecal incontinence, saddle anesthesia, or weakness.      Aggravating factors: climbing stairs, bending     Mitigating factors: prednisone    INTERVAL HISTORY OF PRESENT ILLNESS: Yaneth Vera is a 70 y.o. female with PMH significant for GERD presents as an established patient for the continued management of left buttock pain. The patient reports of some improvement when compared to her initial consultation. Today, the patient reports of intermittent low back and persistent left buttock pain. The patient reports that her pain does not radiate into her lower extremity. She reports that her pain is worsened with walking up stairs. She reports of benefit with PT and celebrex. The patient reports that her current pain is a 3/10. Patient denies of any urinary/fecal incontinence, saddle anesthesia, or weakness. The patient denies of any significant changes in her health since her last appointment. The patient also denies of any changes in the character of her pain since her last appointment.     INTERVENTIONAL PAIN HISTORY: N/A    CURRENT PAIN MEDICATIONS:   Celecoxib 200 mg PO BID - took for 30 days with some benefit   PT referral - reports of benefit     ROS:  Review of Systems   Constitutional: Negative for chills and  "fever.   HENT: Negative for sore throat.    Eyes: Negative for visual disturbance.   Respiratory: Negative for shortness of breath.    Cardiovascular: Negative for chest pain.   Gastrointestinal: Negative for nausea and vomiting.   Genitourinary: Negative for difficulty urinating.   Musculoskeletal: Positive for back pain.   Skin: Negative for rash.   Allergic/Immunologic: Negative for immunocompromised state.   Neurological: Negative for syncope.   Hematological: Does not bruise/bleed easily.   Psychiatric/Behavioral: Negative for suicidal ideas.        MEDICAL, SURGICAL, FAMILY, SOCIAL HX: reviewed    MEDICATIONS/ALLERGIES: reviewed    PHYSICAL EXAM:    VITALS: Vitals reviewed.   Vitals:    12/09/20 1110   BP: (!) 143/78   Pulse: 60   Resp: 19   Temp: 98.5 °F (36.9 °C)   SpO2: 98%   Weight: 112.5 kg (248 lb)   Height: 5' 5" (1.651 m)   PainSc:   5       Physical Exam   Constitutional: She is oriented to person, place, and time and well-developed, well-nourished, and in no distress.   HENT:   Head: Normocephalic and atraumatic.   Eyes: Conjunctivae and EOM are normal. Right eye exhibits no discharge. Left eye exhibits no discharge.   Cardiovascular: Normal rate.   Pulmonary/Chest: Effort normal and breath sounds normal. No respiratory distress.   Abdominal: Soft.   Neurological: She is alert and oriented to person, place, and time.   Skin: Skin is warm and dry. No rash noted. She is not diaphoretic.   Psychiatric: Mood, memory, affect and judgment normal.   Nursing note and vitals reviewed.       UPPER EXTREMITIES: Normal alignment, normal range of motion, no atrophy, no skin changes,  hair growth and nail growth normal and equal bilaterally. No swelling, no tenderness.    LOWER EXTREMITIES:  Normal alignment, normal range of motion, no atrophy, no skin changes,  hair growth and nail growth normal and equal bilaterally. No swelling, no tenderness.     LUMBAR SPINE  Lumbar spine: ROM is full with flexion extension " and oblique extension with no increased pain.     ((--)) Supine straight leg raise    ((--)) Facet loading   Internal and external rotation of the hip causes no increased pain on either side. TTP at the site of the left greater trochanter  Myofascial exam: Tenderness to palpation across lumbar paraspinous muscles.     Piriformis stretch (sitting): positive on the left side     ((+)) TTP at the SI joint on the left  ((+)) LUCI's test on the left  ((--)) One leg stand    ((--)) Distraction test     MOTOR: Tone and bulk: normal bilateral upper and lower Strength: normal   Delt      Bi         Tri        WE      WF                        R          5          5          5          5          5          5            L          5          5          5          5          5          5               IP         ADD     ABD     Quad   TA        Gas      HAM  R          5          5          5          5          5          5          5  L          5          5          5          5          5          5          5     SENSATION: Light touch and pinprick intact bilaterally  REFLEXES: normal, symmetric, nonbrisk.  Toes down, no clonus. Negative ross's sign bilaterally.  GAIT: normal rise, base, steps, and arm swing.      IMAGING: no new imaging to review    ASSESSMENT: Yaneth Vera is a 70 y.o. female with PMH significant for GERD presents as an established patient for the continued management of left buttock pain. The patient reports of some improvement when compared to her initial consultation. Today, the patient reports of intermittent low back and persistent left buttock pain. The patient is improving with conservative management to some degree. Treatment plan outlined below.     PLAN:  1. Prescribed Celebrex 200 mg PO BID for pain and inflammation  2. Prescribed Medrol dose pack for inflammation  3. Encouraged continued participation in PT. I have stressed the importance of physical activity and a home exercise  plan to help with chronic pain and improve health.  4. 4 week follow-up. Would discuss interventions (piriformis injection vs SI joint injection) at that time if that patient's pain has not improved     Leonila Norman MD  Pain Management

## 2021-01-04 ENCOUNTER — PATIENT MESSAGE (OUTPATIENT)
Dept: ADMINISTRATIVE | Facility: HOSPITAL | Age: 71
End: 2021-01-04

## 2021-01-08 DIAGNOSIS — Z12.31 ENCOUNTER FOR SCREENING MAMMOGRAM FOR BREAST CANCER: Primary | ICD-10-CM

## 2021-01-12 ENCOUNTER — PATIENT OUTREACH (OUTPATIENT)
Dept: ADMINISTRATIVE | Facility: OTHER | Age: 71
End: 2021-01-12

## 2021-01-13 ENCOUNTER — OFFICE VISIT (OUTPATIENT)
Dept: PAIN MEDICINE | Facility: CLINIC | Age: 71
End: 2021-01-13
Payer: MEDICARE

## 2021-01-13 VITALS
DIASTOLIC BLOOD PRESSURE: 78 MMHG | HEIGHT: 65 IN | WEIGHT: 248 LBS | HEART RATE: 57 BPM | OXYGEN SATURATION: 98 % | SYSTOLIC BLOOD PRESSURE: 168 MMHG | TEMPERATURE: 99 F | RESPIRATION RATE: 18 BRPM | BODY MASS INDEX: 41.32 KG/M2

## 2021-01-13 DIAGNOSIS — G57.02 PIRIFORMIS SYNDROME OF LEFT SIDE: Primary | ICD-10-CM

## 2021-01-13 DIAGNOSIS — M53.3 SACROILIAC JOINT DYSFUNCTION OF LEFT SIDE: ICD-10-CM

## 2021-01-13 PROCEDURE — 99214 OFFICE O/P EST MOD 30 MIN: CPT | Mod: S$PBB,,, | Performed by: ANESTHESIOLOGY

## 2021-01-13 PROCEDURE — 99999 PR PBB SHADOW E&M-EST. PATIENT-LVL III: ICD-10-PCS | Mod: PBBFAC,,, | Performed by: ANESTHESIOLOGY

## 2021-01-13 PROCEDURE — 99214 PR OFFICE/OUTPT VISIT, EST, LEVL IV, 30-39 MIN: ICD-10-PCS | Mod: S$PBB,,, | Performed by: ANESTHESIOLOGY

## 2021-01-13 PROCEDURE — 99999 PR PBB SHADOW E&M-EST. PATIENT-LVL III: CPT | Mod: PBBFAC,,, | Performed by: ANESTHESIOLOGY

## 2021-01-13 PROCEDURE — 99213 OFFICE O/P EST LOW 20 MIN: CPT | Mod: PBBFAC,PO | Performed by: ANESTHESIOLOGY

## 2021-01-13 RX ORDER — CELECOXIB 200 MG/1
200 CAPSULE ORAL 2 TIMES DAILY
Qty: 60 CAPSULE | Refills: 2 | Status: SHIPPED | OUTPATIENT
Start: 2021-01-13 | End: 2021-12-08

## 2021-01-14 DIAGNOSIS — G57.02 PIRIFORMIS SYNDROME OF LEFT SIDE: Primary | ICD-10-CM

## 2021-01-27 ENCOUNTER — TELEPHONE (OUTPATIENT)
Dept: PAIN MEDICINE | Facility: CLINIC | Age: 71
End: 2021-01-27

## 2021-02-01 PROBLEM — Z00.00 ANNUAL PHYSICAL EXAM: Status: RESOLVED | Noted: 2020-11-02 | Resolved: 2021-02-01

## 2021-02-03 ENCOUNTER — PATIENT MESSAGE (OUTPATIENT)
Dept: ADMINISTRATIVE | Facility: HOSPITAL | Age: 71
End: 2021-02-03

## 2021-02-05 ENCOUNTER — LAB VISIT (OUTPATIENT)
Dept: LAB | Facility: HOSPITAL | Age: 71
End: 2021-02-05
Attending: FAMILY MEDICINE
Payer: COMMERCIAL

## 2021-02-05 DIAGNOSIS — Z12.11 COLON CANCER SCREENING: ICD-10-CM

## 2021-02-05 PROCEDURE — 82274 ASSAY TEST FOR BLOOD FECAL: CPT

## 2021-02-10 LAB — HEMOCCULT STL QL IA: NEGATIVE

## 2021-03-03 ENCOUNTER — HOSPITAL ENCOUNTER (OUTPATIENT)
Dept: RADIOLOGY | Facility: HOSPITAL | Age: 71
Discharge: HOME | End: 2021-03-03
Attending: OBSTETRICS & GYNECOLOGY
Payer: MEDICARE

## 2021-03-03 DIAGNOSIS — Z12.31 ENCOUNTER FOR SCREENING MAMMOGRAM FOR BREAST CANCER: ICD-10-CM

## 2021-03-03 PROCEDURE — 77067 SCR MAMMO BI INCL CAD: CPT | Mod: TC,PO

## 2021-03-04 DIAGNOSIS — Z11.59 NEED FOR HEPATITIS C SCREENING TEST: ICD-10-CM

## 2021-04-28 ENCOUNTER — PES CALL (OUTPATIENT)
Dept: ADMINISTRATIVE | Facility: CLINIC | Age: 71
End: 2021-04-28

## 2021-05-01 NOTE — PROGRESS NOTES
"Subjective:       Patient ID: Yaneth Vera is a 68 y.o. female.    Chief Complaint: Follow-up    Follow up    Was not able to tolerate metformin  Diarrhea  Still working on weight loss, diet and exercise      Review of Systems   Cardiovascular: Negative for chest pain and palpitations.   Gastrointestinal: Positive for diarrhea and nausea. Negative for abdominal pain.         Reviewed family, medical, surgical, and social history.    Objective:      BP (!) 162/64 (BP Location: Left arm, Patient Position: Sitting, BP Method: X-Large (Automatic))   Pulse (!) 59   Resp 18   Ht 5' 5" (1.651 m)   Wt 108.3 kg (238 lb 12.8 oz)   SpO2 97%   BMI 39.74 kg/m²   Physical Exam   Constitutional: She is oriented to person, place, and time. She appears well-developed and well-nourished. No distress.   HENT:   Head: Normocephalic and atraumatic.   Nose: Nose normal.   Mouth/Throat: Oropharynx is clear and moist. No oropharyngeal exudate.   Eyes: Conjunctivae and EOM are normal. Pupils are equal, round, and reactive to light. No scleral icterus.   Neck: Normal range of motion. Neck supple. No thyromegaly present.   Cardiovascular: Normal rate, regular rhythm and normal heart sounds. Exam reveals no gallop and no friction rub.   No murmur heard.  Pulmonary/Chest: Effort normal and breath sounds normal. No respiratory distress. She has no wheezes. She has no rales. She exhibits no tenderness.   Abdominal: Soft. Bowel sounds are normal. She exhibits no distension. There is no tenderness. There is no guarding.   Musculoskeletal: Normal range of motion. She exhibits no edema, tenderness or deformity.   Lymphadenopathy:     She has no cervical adenopathy.   Neurological: She is alert and oriented to person, place, and time. She displays normal reflexes. No cranial nerve deficit or sensory deficit. She exhibits normal muscle tone.   Skin: Skin is warm and dry. No rash noted. She is not diaphoretic. No erythema. No pallor. "   Psychiatric: She has a normal mood and affect. Her behavior is normal. Judgment and thought content normal.   Nursing note and vitals reviewed.      Assessment:       1. Senile osteoporosis    2. Prediabetes        Plan:       Senile osteoporosis  -     DXA Bone Density Spine And Hip; Future; Expected date: 10/30/2018    Prediabetes  -     glimepiride (AMARYL) 1 MG tablet; Take 1 tablet (1 mg total) by mouth before breakfast.  Dispense: 30 tablet; Refill: 3    Other orders  -     Cancel: DXA Bone Density Spine And Hip; Future; Expected date: 10/30/2018    Stop metformin  Start glimepiride given cost and availability        Risks, benefits, and side effects were discussed with the patient. All questions were answered to the fullest satisfaction of the patient, and pt verbalized understanding and agreement to treatment plan. Pt was to call with any new or worsening symptoms, or present to the ER.   36.7

## 2021-08-10 ENCOUNTER — TELEPHONE (OUTPATIENT)
Dept: FAMILY MEDICINE | Facility: CLINIC | Age: 71
End: 2021-08-10

## 2021-08-10 DIAGNOSIS — Z76.0 ENCOUNTER FOR MEDICATION REFILL: ICD-10-CM

## 2021-08-10 RX ORDER — OMEPRAZOLE 20 MG/1
20 CAPSULE, DELAYED RELEASE ORAL DAILY
Qty: 90 CAPSULE | Refills: 3 | Status: SHIPPED | OUTPATIENT
Start: 2021-08-10 | End: 2022-04-19 | Stop reason: SDUPTHER

## 2021-09-17 ENCOUNTER — TELEPHONE (OUTPATIENT)
Dept: FAMILY MEDICINE | Facility: CLINIC | Age: 71
End: 2021-09-17

## 2021-09-17 DIAGNOSIS — R73.03 PREDIABETES: Primary | ICD-10-CM

## 2021-10-27 ENCOUNTER — TELEPHONE (OUTPATIENT)
Dept: FAMILY MEDICINE | Facility: CLINIC | Age: 71
End: 2021-10-27
Payer: MEDICARE

## 2021-10-27 RX ORDER — SCOLOPAMINE TRANSDERMAL SYSTEM 1 MG/1
1 PATCH, EXTENDED RELEASE TRANSDERMAL
Qty: 8 PATCH | Refills: 0 | Status: SHIPPED | OUTPATIENT
Start: 2021-10-27 | End: 2021-11-20

## 2021-11-03 ENCOUNTER — LAB VISIT (OUTPATIENT)
Dept: LAB | Facility: HOSPITAL | Age: 71
End: 2021-11-03
Attending: FAMILY MEDICINE
Payer: MEDICARE

## 2021-11-03 DIAGNOSIS — Z11.59 NEED FOR HEPATITIS C SCREENING TEST: ICD-10-CM

## 2021-11-03 DIAGNOSIS — R73.03 PREDIABETES: ICD-10-CM

## 2021-11-03 LAB
ALBUMIN SERPL BCP-MCNC: 3.7 G/DL (ref 3.5–5.2)
ALBUMIN/CREAT UR: 4.5 UG/MG (ref 0–30)
ALP SERPL-CCNC: 102 U/L (ref 55–135)
ALT SERPL W/O P-5'-P-CCNC: 22 U/L (ref 10–44)
ANION GAP SERPL CALC-SCNC: 9 MMOL/L (ref 8–16)
AST SERPL-CCNC: 21 U/L (ref 10–40)
BASOPHILS # BLD AUTO: 0.08 K/UL (ref 0–0.2)
BASOPHILS NFR BLD: 1.2 % (ref 0–1.9)
BILIRUB SERPL-MCNC: 0.8 MG/DL (ref 0.1–1)
BUN SERPL-MCNC: 17 MG/DL (ref 8–23)
CALCIUM SERPL-MCNC: 9.6 MG/DL (ref 8.7–10.5)
CHLORIDE SERPL-SCNC: 103 MMOL/L (ref 95–110)
CHOLEST SERPL-MCNC: 210 MG/DL (ref 120–199)
CHOLEST/HDLC SERPL: 3.8 {RATIO} (ref 2–5)
CO2 SERPL-SCNC: 27 MMOL/L (ref 23–29)
CREAT SERPL-MCNC: 0.9 MG/DL (ref 0.5–1.4)
CREAT UR-MCNC: 157 MG/DL (ref 15–325)
DIFFERENTIAL METHOD: ABNORMAL
EOSINOPHIL # BLD AUTO: 0.1 K/UL (ref 0–0.5)
EOSINOPHIL NFR BLD: 1.5 % (ref 0–8)
ERYTHROCYTE [DISTWIDTH] IN BLOOD BY AUTOMATED COUNT: 13 % (ref 11.5–14.5)
EST. GFR  (AFRICAN AMERICAN): >60 ML/MIN/1.73 M^2
EST. GFR  (NON AFRICAN AMERICAN): >60 ML/MIN/1.73 M^2
ESTIMATED AVG GLUCOSE: 137 MG/DL (ref 68–131)
GLUCOSE SERPL-MCNC: 126 MG/DL (ref 70–110)
HBA1C MFR BLD: 6.4 % (ref 4–5.6)
HCT VFR BLD AUTO: 41.2 % (ref 37–48.5)
HCV AB SERPL QL IA: NEGATIVE
HDLC SERPL-MCNC: 55 MG/DL (ref 40–75)
HDLC SERPL: 26.2 % (ref 20–50)
HGB BLD-MCNC: 13.4 G/DL (ref 12–16)
IMM GRANULOCYTES # BLD AUTO: 0.04 K/UL (ref 0–0.04)
IMM GRANULOCYTES NFR BLD AUTO: 0.6 % (ref 0–0.5)
LDLC SERPL CALC-MCNC: 124.8 MG/DL (ref 63–159)
LYMPHOCYTES # BLD AUTO: 1.8 K/UL (ref 1–4.8)
LYMPHOCYTES NFR BLD: 27.8 % (ref 18–48)
MCH RBC QN AUTO: 29.3 PG (ref 27–31)
MCHC RBC AUTO-ENTMCNC: 32.5 G/DL (ref 32–36)
MCV RBC AUTO: 90 FL (ref 82–98)
MICROALBUMIN UR DL<=1MG/L-MCNC: 7 UG/ML
MONOCYTES # BLD AUTO: 0.5 K/UL (ref 0.3–1)
MONOCYTES NFR BLD: 7.1 % (ref 4–15)
NEUTROPHILS # BLD AUTO: 4 K/UL (ref 1.8–7.7)
NEUTROPHILS NFR BLD: 61.8 % (ref 38–73)
NONHDLC SERPL-MCNC: 155 MG/DL
NRBC BLD-RTO: 0 /100 WBC
PLATELET # BLD AUTO: 207 K/UL (ref 150–450)
PMV BLD AUTO: 9.8 FL (ref 9.2–12.9)
POTASSIUM SERPL-SCNC: 4.1 MMOL/L (ref 3.5–5.1)
PROT SERPL-MCNC: 7.4 G/DL (ref 6–8.4)
RBC # BLD AUTO: 4.58 M/UL (ref 4–5.4)
SODIUM SERPL-SCNC: 139 MMOL/L (ref 136–145)
T4 FREE SERPL-MCNC: 1.07 NG/DL (ref 0.71–1.51)
TRIGL SERPL-MCNC: 151 MG/DL (ref 30–150)
TSH SERPL DL<=0.005 MIU/L-ACNC: 4.04 UIU/ML (ref 0.4–4)
WBC # BLD AUTO: 6.51 K/UL (ref 3.9–12.7)

## 2021-11-03 PROCEDURE — 36415 COLL VENOUS BLD VENIPUNCTURE: CPT | Performed by: FAMILY MEDICINE

## 2021-11-03 PROCEDURE — 82570 ASSAY OF URINE CREATININE: CPT | Performed by: FAMILY MEDICINE

## 2021-11-03 PROCEDURE — 84443 ASSAY THYROID STIM HORMONE: CPT | Performed by: FAMILY MEDICINE

## 2021-11-03 PROCEDURE — 80053 COMPREHEN METABOLIC PANEL: CPT | Performed by: FAMILY MEDICINE

## 2021-11-03 PROCEDURE — 86803 HEPATITIS C AB TEST: CPT | Performed by: FAMILY MEDICINE

## 2021-11-03 PROCEDURE — 84439 ASSAY OF FREE THYROXINE: CPT | Performed by: FAMILY MEDICINE

## 2021-11-03 PROCEDURE — 85025 COMPLETE CBC W/AUTO DIFF WBC: CPT | Performed by: FAMILY MEDICINE

## 2021-11-03 PROCEDURE — 80061 LIPID PANEL: CPT | Performed by: FAMILY MEDICINE

## 2021-11-03 PROCEDURE — 83036 HEMOGLOBIN GLYCOSYLATED A1C: CPT | Performed by: FAMILY MEDICINE

## 2021-12-02 ENCOUNTER — PATIENT MESSAGE (OUTPATIENT)
Dept: ADMINISTRATIVE | Facility: HOSPITAL | Age: 71
End: 2021-12-02
Payer: MEDICARE

## 2021-12-08 ENCOUNTER — OFFICE VISIT (OUTPATIENT)
Dept: FAMILY MEDICINE | Facility: CLINIC | Age: 71
End: 2021-12-08
Payer: MEDICARE

## 2021-12-08 VITALS
BODY MASS INDEX: 42.57 KG/M2 | DIASTOLIC BLOOD PRESSURE: 82 MMHG | HEART RATE: 54 BPM | HEIGHT: 65 IN | RESPIRATION RATE: 20 BRPM | SYSTOLIC BLOOD PRESSURE: 136 MMHG | OXYGEN SATURATION: 97 % | WEIGHT: 255.5 LBS

## 2021-12-08 DIAGNOSIS — E66.01 MORBID OBESITY: ICD-10-CM

## 2021-12-08 DIAGNOSIS — Z78.0 ASYMPTOMATIC MENOPAUSE: ICD-10-CM

## 2021-12-08 DIAGNOSIS — Z00.00 ANNUAL PHYSICAL EXAM: Primary | ICD-10-CM

## 2021-12-08 PROCEDURE — 99999 PR PBB SHADOW E&M-EST. PATIENT-LVL III: ICD-10-PCS | Mod: PBBFAC,,, | Performed by: FAMILY MEDICINE

## 2021-12-08 PROCEDURE — 99213 OFFICE O/P EST LOW 20 MIN: CPT | Mod: PBBFAC,PN | Performed by: FAMILY MEDICINE

## 2021-12-08 PROCEDURE — 99999 PR PBB SHADOW E&M-EST. PATIENT-LVL III: CPT | Mod: PBBFAC,,, | Performed by: FAMILY MEDICINE

## 2021-12-08 PROCEDURE — 99397 PER PM REEVAL EST PAT 65+ YR: CPT | Mod: S$PBB,,, | Performed by: FAMILY MEDICINE

## 2021-12-08 PROCEDURE — 99397 PR PREVENTIVE VISIT,EST,65 & OVER: ICD-10-PCS | Mod: S$PBB,,, | Performed by: FAMILY MEDICINE

## 2021-12-08 RX ORDER — DORZOLAMIDE HYDROCHLORIDE AND TIMOLOL MALEATE PRESERVATIVE FREE 20; 5 MG/ML; MG/ML
1 SOLUTION/ DROPS OPHTHALMIC 2 TIMES DAILY
COMMUNITY
Start: 2021-08-11 | End: 2022-04-19

## 2021-12-08 RX ORDER — DORZOLAMIDE HYDROCHLORIDE AND TIMOLOL MALEATE 20; 5 MG/ML; MG/ML
1 SOLUTION/ DROPS OPHTHALMIC 2 TIMES DAILY
COMMUNITY

## 2021-12-08 RX ORDER — AZITHROMYCIN 250 MG/1
TABLET, FILM COATED ORAL
Qty: 6 TABLET | Refills: 0 | Status: SHIPPED | OUTPATIENT
Start: 2021-12-08 | End: 2022-04-19

## 2022-01-10 ENCOUNTER — PES CALL (OUTPATIENT)
Dept: ADMINISTRATIVE | Facility: CLINIC | Age: 72
End: 2022-01-10
Payer: MEDICARE

## 2022-01-13 ENCOUNTER — HOSPITAL ENCOUNTER (OUTPATIENT)
Dept: RADIOLOGY | Facility: HOSPITAL | Age: 72
Discharge: HOME | End: 2022-01-13
Attending: FAMILY MEDICINE
Payer: MEDICARE

## 2022-01-13 DIAGNOSIS — Z78.0 ASYMPTOMATIC MENOPAUSE: ICD-10-CM

## 2022-01-13 PROCEDURE — 77080 DXA BONE DENSITY AXIAL: CPT | Mod: 26,,, | Performed by: RADIOLOGY

## 2022-01-13 PROCEDURE — 77080 DXA BONE DENSITY AXIAL: CPT | Mod: TC

## 2022-01-13 PROCEDURE — 77080 DEXA BONE DENSITY SPINE HIP: ICD-10-PCS | Mod: 26,,, | Performed by: RADIOLOGY

## 2022-02-10 DIAGNOSIS — Z12.31 ENCOUNTER FOR SCREENING MAMMOGRAM FOR MALIGNANT NEOPLASM OF BREAST: Primary | ICD-10-CM

## 2022-02-16 DIAGNOSIS — Z12.11 COLON CANCER SCREENING: ICD-10-CM

## 2022-03-11 ENCOUNTER — HOSPITAL ENCOUNTER (OUTPATIENT)
Dept: RADIOLOGY | Facility: HOSPITAL | Age: 72
Discharge: HOME | End: 2022-03-11
Attending: OBSTETRICS & GYNECOLOGY
Payer: MEDICARE

## 2022-03-11 VITALS — WEIGHT: 255.5 LBS | BODY MASS INDEX: 42.57 KG/M2 | HEIGHT: 65 IN

## 2022-03-11 DIAGNOSIS — Z12.31 ENCOUNTER FOR SCREENING MAMMOGRAM FOR MALIGNANT NEOPLASM OF BREAST: ICD-10-CM

## 2022-03-11 PROCEDURE — 77063 BREAST TOMOSYNTHESIS BI: CPT | Mod: TC,PO

## 2022-04-14 ENCOUNTER — TELEPHONE (OUTPATIENT)
Dept: FAMILY MEDICINE | Facility: CLINIC | Age: 72
End: 2022-04-14
Payer: MEDICARE

## 2022-04-14 NOTE — TELEPHONE ENCOUNTER
"----- Message from Apurva Riggins sent at 4/14/2022  4:55 PM CDT -----  Regarding: appointment  "Type:  Patient Call Back    Who Called:JUNIE RIOJAS [[id number]]    What is the reqeust in detail:pt requesting call back to schedule an sooner appointment than July. Please advise     Can the clinic reply by [facility]?no    Best Call Back Number:[phone]      Additional Information:Pt is having pain in left leg            "

## 2022-04-18 ENCOUNTER — PATIENT MESSAGE (OUTPATIENT)
Dept: FAMILY MEDICINE | Facility: CLINIC | Age: 72
End: 2022-04-18
Payer: MEDICARE

## 2022-04-19 ENCOUNTER — OFFICE VISIT (OUTPATIENT)
Dept: FAMILY MEDICINE | Facility: CLINIC | Age: 72
End: 2022-04-19
Payer: MEDICARE

## 2022-04-19 VITALS
SYSTOLIC BLOOD PRESSURE: 132 MMHG | RESPIRATION RATE: 15 BRPM | HEART RATE: 52 BPM | WEIGHT: 247.81 LBS | HEIGHT: 65 IN | BODY MASS INDEX: 41.29 KG/M2 | DIASTOLIC BLOOD PRESSURE: 65 MMHG | OXYGEN SATURATION: 95 %

## 2022-04-19 DIAGNOSIS — M54.32 SCIATICA OF LEFT SIDE: Primary | ICD-10-CM

## 2022-04-19 DIAGNOSIS — Z12.11 COLON CANCER SCREENING: ICD-10-CM

## 2022-04-19 DIAGNOSIS — Z76.0 ENCOUNTER FOR MEDICATION REFILL: ICD-10-CM

## 2022-04-19 PROCEDURE — 96372 THER/PROPH/DIAG INJ SC/IM: CPT | Mod: 25,PBBFAC,PN

## 2022-04-19 PROCEDURE — 99214 PR OFFICE/OUTPT VISIT, EST, LEVL IV, 30-39 MIN: ICD-10-PCS | Mod: S$PBB,,, | Performed by: FAMILY MEDICINE

## 2022-04-19 PROCEDURE — 99999 PR PBB SHADOW E&M-EST. PATIENT-LVL III: CPT | Mod: PBBFAC,,, | Performed by: FAMILY MEDICINE

## 2022-04-19 PROCEDURE — 99999 PR PBB SHADOW E&M-EST. PATIENT-LVL III: ICD-10-PCS | Mod: PBBFAC,,, | Performed by: FAMILY MEDICINE

## 2022-04-19 PROCEDURE — 99214 OFFICE O/P EST MOD 30 MIN: CPT | Mod: S$PBB,,, | Performed by: FAMILY MEDICINE

## 2022-04-19 PROCEDURE — 99213 OFFICE O/P EST LOW 20 MIN: CPT | Mod: PBBFAC,PN | Performed by: FAMILY MEDICINE

## 2022-04-19 RX ORDER — TIZANIDINE 4 MG/1
4 TABLET ORAL EVERY 8 HOURS PRN
Qty: 90 TABLET | Refills: 1 | Status: SHIPPED | OUTPATIENT
Start: 2022-04-19 | End: 2022-06-10

## 2022-04-19 RX ORDER — PREDNISONE 20 MG/1
20 TABLET ORAL 2 TIMES DAILY
Qty: 10 TABLET | Refills: 0 | Status: SHIPPED | OUTPATIENT
Start: 2022-04-19 | End: 2022-04-24

## 2022-04-19 RX ORDER — OMEPRAZOLE 20 MG/1
20 CAPSULE, DELAYED RELEASE ORAL DAILY
Qty: 90 CAPSULE | Refills: 3 | Status: SHIPPED | OUTPATIENT
Start: 2022-04-19 | End: 2023-05-14

## 2022-04-19 RX ORDER — DEXAMETHASONE SODIUM PHOSPHATE 4 MG/ML
8 INJECTION, SOLUTION INTRA-ARTICULAR; INTRALESIONAL; INTRAMUSCULAR; INTRAVENOUS; SOFT TISSUE ONCE
Status: COMPLETED | OUTPATIENT
Start: 2022-04-19 | End: 2022-04-19

## 2022-04-19 RX ORDER — AZITHROMYCIN 250 MG/1
TABLET, FILM COATED ORAL
Qty: 6 TABLET | Refills: 0 | Status: SHIPPED | OUTPATIENT
Start: 2022-04-19 | End: 2023-07-30 | Stop reason: ALTCHOICE

## 2022-04-19 RX ADMIN — DEXAMETHASONE SODIUM PHOSPHATE 8 MG: 4 INJECTION INTRA-ARTICULAR; INTRALESIONAL; INTRAMUSCULAR; INTRAVENOUS; SOFT TISSUE at 07:04

## 2022-04-19 NOTE — PROGRESS NOTES
Orders in place for Decadron 8mg IM. Patient states name, date of birth and allergies reviewed.  Decadron 4mg/ml x2 vials = 8mg/2ml administered IM to left ventrogluteal muscle. NDC: 1422-8397-04 Lot: [id number] Exp: 11/30/2023. Patient tolerated without complaints. Instructed to remain in clinic for 15 minutes post injection. Verbalized understanding. No immediate reaction noted.

## 2022-04-19 NOTE — PROGRESS NOTES
" Patient ID: Yaneth Vera is a 71 y.o. female.    Chief Complaint: Leg Pain (L leg x1 week) and Medication Refill      Reviewed family, medical, surgical, and social history.    No cp/sob  No change in mental status  No fever  No asymmetrical limb swelling    Objective:      /65 (BP Location: Left arm, Patient Position: Sitting, BP Method: Large (Automatic))   Pulse (!) 52   Resp 15   Ht 5' 5" (1.651 m)   Wt 112.4 kg (247 lb 12.8 oz)   SpO2 95%   BMI 41.24 kg/m²   RRR, CTAB, s/nt/nd, no c/c/e, non-toxic appearing, no focal weakness  Assessment:       1. Sciatica of left side    2. Encounter for medication refill    3. Colon cancer screening        Plan:       Sciatica of left side  -     dexamethasone injection 8 mg  -     predniSONE (DELTASONE) 20 MG tablet; Take 1 tablet (20 mg total) by mouth 2 (two) times daily. for 5 days  Dispense: 10 tablet; Refill: 0  -     tiZANidine (ZANAFLEX) 4 MG tablet; Take 1 tablet (4 mg total) by mouth every 8 (eight) hours as needed (leg pain).  Dispense: 90 tablet; Refill: 1    Encounter for medication refill  -     omeprazole (PRILOSEC) 20 MG capsule; Take 1 capsule (20 mg total) by mouth once daily.  Dispense: 90 capsule; Refill: 3    Colon cancer screening  -     Cologuard Screening (Multitarget Stool DNA); Future; Expected date: 04/19/2022    Other orders  -     azithromycin (Z-SANTOSH) 250 MG tablet; Take two tablets on day 1, then 1 tablet on days 2-5.  Dispense: 6 tablet; Refill: 0            Reviewed, monitored, evaluated, and assessed chronic conditions as outlined above  Continue current medicines, any changes noted above  As shown  No unilateral edema  Labs, radiology studies, and procedures/notes from the last 3 months were reviewed.    Risks, benefits, and side effects were discussed with the patient. All questions were answered to the fullest satisfaction of the patient, and pt verbalized understanding and agreement to treatment plan. Pt was to call with any " new or worsening symptoms, or present to the ER.

## 2022-04-28 ENCOUNTER — PATIENT OUTREACH (OUTPATIENT)
Dept: ADMINISTRATIVE | Facility: HOSPITAL | Age: 72
End: 2022-04-28
Payer: MEDICARE

## 2022-04-28 NOTE — PROGRESS NOTES
Population Health Outreach. Spoke with pt about overdue CRCS. She stated that she has a kit at home and will do once she returns from vacation. She did confirm that she had a colonoscopy within the last ten years and that it was either at [facility] or [facility]. I told her I will request the record and to hold off on the home screening kit until I get her past record.    04/28/2022  EFAX SENT TO [facility] MED REC DEPT FOR MOST RECENT COLONOSCOPY RESULTS

## 2022-04-28 NOTE — LETTER
FAX      AUTHORIZATION FOR RELEASE OF   CONFIDENTIAL INFORMATION        [facility] MEDICAL RECORDS      We are seeing Yaneth Vera, date of birth 1950, in the clinic at [facility]. Cesar Vanessa MD is the patient's PCP. Yaneth Vera has an outstanding lab/procedure at the time we reviewed their chart. In order to help keep their health information updated, Yaneth Vera has authorized us to request the following medical record(s):       ( X )  COLONOSCOPY (WITHIN 10 YRS)        Please fax records to [facility]  [phone]      Felicia Navas LPN  Performance Improvement Coordinator  [facility] [address] [place] [address] [phone]

## 2022-05-02 ENCOUNTER — PATIENT OUTREACH (OUTPATIENT)
Dept: ADMINISTRATIVE | Facility: HOSPITAL | Age: 72
End: 2022-05-02
Payer: MEDICARE

## 2022-05-02 NOTE — PROGRESS NOTES
Records Received, hyper-linked into chart at this time. The following record(s)  below were uploaded for Health Maintenance .    2013  COLONOSCOPY

## 2022-05-10 ENCOUNTER — PATIENT MESSAGE (OUTPATIENT)
Dept: ORTHOPEDICS | Facility: CLINIC | Age: 72
End: 2022-05-10
Payer: MEDICARE

## 2022-05-26 DIAGNOSIS — M25.562 LEFT KNEE PAIN, UNSPECIFIED CHRONICITY: Primary | ICD-10-CM

## 2022-05-29 LAB — NONINV COLON CA DNA+OCC BLD SCRN STL QL: NEGATIVE

## 2022-06-03 ENCOUNTER — OFFICE VISIT (OUTPATIENT)
Dept: ORTHOPEDICS | Facility: CLINIC | Age: 72
End: 2022-06-03
Payer: MEDICARE

## 2022-06-03 ENCOUNTER — HOSPITAL ENCOUNTER (OUTPATIENT)
Dept: RADIOLOGY | Facility: HOSPITAL | Age: 72
Discharge: HOME | End: 2022-06-03
Attending: ORTHOPAEDIC SURGERY
Payer: MEDICARE

## 2022-06-03 VITALS — RESPIRATION RATE: 20 BRPM | BODY MASS INDEX: 41.29 KG/M2 | HEIGHT: 65 IN | WEIGHT: 247.81 LBS

## 2022-06-03 DIAGNOSIS — M25.562 CHRONIC PAIN OF LEFT KNEE: ICD-10-CM

## 2022-06-03 DIAGNOSIS — M70.52 PES ANSERINUS BURSITIS OF LEFT KNEE: Primary | ICD-10-CM

## 2022-06-03 DIAGNOSIS — G89.29 CHRONIC PAIN OF LEFT KNEE: ICD-10-CM

## 2022-06-03 DIAGNOSIS — M17.12 PRIMARY OSTEOARTHRITIS OF LEFT KNEE: ICD-10-CM

## 2022-06-03 DIAGNOSIS — M25.562 LEFT KNEE PAIN, UNSPECIFIED CHRONICITY: ICD-10-CM

## 2022-06-03 PROCEDURE — 73562 X-RAY EXAM OF KNEE 3: CPT | Mod: TC,PN,LT

## 2022-06-03 PROCEDURE — 20610 LARGE JOINT ASPIRATION/INJECTION: L ANSERINE BURSA: ICD-10-PCS | Mod: S$PBB,LT,, | Performed by: ORTHOPAEDIC SURGERY

## 2022-06-03 PROCEDURE — 99213 PR OFFICE/OUTPT VISIT, EST, LEVL III, 20-29 MIN: ICD-10-PCS | Mod: 25,S$PBB,, | Performed by: ORTHOPAEDIC SURGERY

## 2022-06-03 PROCEDURE — 99999 PR PBB SHADOW E&M-EST. PATIENT-LVL III: ICD-10-PCS | Mod: PBBFAC,,, | Performed by: ORTHOPAEDIC SURGERY

## 2022-06-03 PROCEDURE — 73562 X-RAY EXAM OF KNEE 3: CPT | Mod: 26,LT,, | Performed by: RADIOLOGY

## 2022-06-03 PROCEDURE — 99213 OFFICE O/P EST LOW 20 MIN: CPT | Mod: PBBFAC,PN | Performed by: ORTHOPAEDIC SURGERY

## 2022-06-03 PROCEDURE — 73562 XR KNEE 3 VIEW LEFT: ICD-10-PCS | Mod: 26,LT,, | Performed by: RADIOLOGY

## 2022-06-03 PROCEDURE — 20610 DRAIN/INJ JOINT/BURSA W/O US: CPT | Mod: PBBFAC,PN,LT | Performed by: ORTHOPAEDIC SURGERY

## 2022-06-03 PROCEDURE — 99213 OFFICE O/P EST LOW 20 MIN: CPT | Mod: 25,S$PBB,, | Performed by: ORTHOPAEDIC SURGERY

## 2022-06-03 PROCEDURE — 99999 PR PBB SHADOW E&M-EST. PATIENT-LVL III: CPT | Mod: PBBFAC,,, | Performed by: ORTHOPAEDIC SURGERY

## 2022-06-03 RX ORDER — TRIAMCINOLONE ACETONIDE 40 MG/ML
40 INJECTION, SUSPENSION INTRA-ARTICULAR; INTRAMUSCULAR
Status: DISCONTINUED | OUTPATIENT
Start: 2022-06-03 | End: 2022-06-03 | Stop reason: HOSPADM

## 2022-06-03 RX ADMIN — TRIAMCINOLONE ACETONIDE 40 MG: 40 INJECTION, SUSPENSION INTRA-ARTICULAR; INTRAMUSCULAR at 02:06

## 2022-06-03 NOTE — PROGRESS NOTES
Subjective:      Patient ID: Yaneth Vera is a 71 y.o. female.    Chief Complaint: Pain of the Left Knee      HPI:   Ms. Vera returns today with new complaints of left knee pain which began in January 2022 after she fell in her yd and then she had an exacerbation in March 2022 which she had radiation of pain from her back down the back of her leg into her knee.  She was given a cortisone injection along with a Medrol Dosepak which resolved her symptoms enough where she could go on a trip to [facility].  Now after her trip she has pain at the medial side of her knee.  She gets swelling and giving way but denied locking.  Walking increases her symptoms while rest improves them.  She has taken NSAIDs with help.  She has not done physical therapy, worn a brace, nor had injections.    ROS:  No new diagnosis / surgery/prescriptions since last office visit on 09/04/2020.  Constitution: Negative for diaphoresis and fever.   HENT: Negative for ear pain, hearing loss, nosebleeds and tinnitus.    Eyes: Negative for pain, redness and visual disturbance.   Cardiovascular: Negative for chest pain and palpitations.   Respiratory: Negative for cough and shortness of breath.    Skin: Negative for itching and rash.   Musculoskeletal: Positive for back pain, joint swelling, myalgias and stiffness.   Gastrointestinal: Negative for constipation, diarrhea, nausea and vomiting.   Genitourinary: Negative for dysuria, frequency and hematuria.   Neurological: Negative for dizziness, headaches, numbness, seizures and weakness.   Psychiatric/Behavioral: The patient is not nervous/anxious.    Allergic/Immunologic: Negative for environmental allergies.       Objective:      Physical Exam:   General: AAOx3.  No acute distress  Vascular:  Pulses intact and equal bilaterally.  Capillary refill less than 3 seconds and equal bilaterally  Neurologic:  Pinprick and soft touch intact and equal bilaterally  Integment:  No ecchymosis, no  errythema  Extremity:   Knee:  Extension/ flexion equal bilaterally 0/118 degrees.  Mild crepitus with motion both knees.  Negative drop home both knees.  Negative patellar load / compression bilaterally.  Negative patella apprehension / relocation bilaterally varus/valgus stressing equal bilaterally with endpoint.  Lachman's /drawer equal bilaterally with endpoint.  Relatively no joint line tenderness both knees.  Sulma negative both knees.  Tender at the anserine insertion left knee.  Small Baker cyst both knees.  Mild swelling at the anserine insertion left knee.  Radiography:  Personally reviewed  X-rays of the left knee completed on 06/03/2022 showed mild to moderate arthritic changes with early osteophytes.      Assessment:       Impression:     1. Pes anserinus bursitis of left knee    2. Primary osteoarthritis of left knee    3. Chronic pain of left knee          Plan:       1.  Discussed physical examination and radiographic findings with the patient. Yaneth understands that she has   Pes anserine bursitis of her left knee with some mild arthritis.  Treatment alternatives and outcomes were discussed with the patient she understands she could be treated conservatively with observation, activity modification, NSAIDs, bracing, physical therapy, injections, or she could consider surgical intervention such as bursectomy.  Recommend she trial conservative management a little bit longer if she fails further conservative care then consider surgical intervention at that time.  2. Offered a steroid injection to the anserine insertion of the left knee, she elected to proceed.  3. Recommend the patient purchase an over-the-counter neoprene sleeve brace and wear it on her left knee when ambulating.  4. Home exercises such as quadriceps and hamstring strengthening were entertained.  5. Take NSAIDs as tolerated allowed by PCM.  6. Recommend the patient purchase over-the-counter Voltaren gel and applied to her knee  twice daily and massage in for 2 minutes.  7. Recommend the patient get arch supports and placed them in her shoes and wear them when ambulating.  8. Follow up p.r.n..

## 2022-06-03 NOTE — PROCEDURES
Large Joint Aspiration/Injection: L anserine bursa    Date/Time: 6/3/2022 2:30 PM  Performed by: Rupesh Craig DO  Authorized by: Rupesh Craig, DO     Consent Done?:  Yes (Verbal)  Indications:  Diagnostic evaluation and pain  Site marked: the procedure site was marked    Timeout: prior to procedure the correct patient, procedure, and site was verified    Prep: patient was prepped and draped in usual sterile fashion      Details:  Needle Size:  22 G  Ultrasonic Guidance for needle placement?: No    Approach:  Anteromedial  Location:  Knee  Site:  L anserine bursa  Medications:  40 mg triamcinolone acetonide 40 mg/mL  Patient tolerance:  Patient tolerated the procedure well with no immediate complications       Vancomycin Assessment    Sheila Sosa is a 81 y o  female who is currently receiving vancomycin 500 mg Q24H for empiric MRSA coverage for pneumonia  Relevant clinical data and objective history reviewed:  Creatinine   Date Value Ref Range Status   01/03/2019 1 20 0 60 - 1 30 mg/dL Final     Comment:     Standardized to IDMS reference method     /67   Pulse 96   Temp 98 5 °F (36 9 °C) (Oral)   Resp (!) 32   Ht 5' 1" (1 549 m)   Wt 44 5 kg (98 lb 1 7 oz)   SpO2 97%   BMI 18 54 kg/m²   I/O last 3 completed shifts: In: 1800 [IV Piggyback:1800]  Out: 275 [Urine:275]  Lab Results   Component Value Date/Time    BUN 41 (H) 01/03/2019 12:13 AM    WBC 13 11 (H) 01/02/2019 11:47 PM    HGB 13 3 01/03/2019 12:03 AM    HGB 13 3 01/02/2019 11:47 PM    HCT 39 01/03/2019 12:03 AM    HCT 43 2 01/02/2019 11:47 PM     (H) 01/02/2019 11:47 PM     01/02/2019 11:47 PM     Temp Readings from Last 3 Encounters:   01/03/19 98 5 °F (36 9 °C) (Oral)     Vancomycin Days of Therapy: 1    Assessment/Plan  The patient is currently on vancomycin utilizing scheduled dosing based on actual body weight  Baseline risks associated with therapy include: pre-existing renal impairment, concomitant nephrotoxic medications, advanced age and dehydration  The patient is currently receiving 500 mg Q24H and is clinically appropriate and dose will be continued  Pharmacy will also follow closely for s/sx of nephrotoxicity, infusion reactions and appropriateness of therapy  BMP and CBC will be ordered per protocol  Plan for trough as patient approaches steady state, prior to the 4th  dose at approximately 0130 on 1/06/2019  Due to infection severity, will target a trough of 15-20 (appropriate for most indications)   Pharmacy will continue to follow the patients culture results and clinical progress daily  ICU team obtaining MRSA swab  Will continue to follow  If MRSA swab negative, recommend discontinuing vancomycin      [address] [facility] Sarika Marin, PharmD

## 2022-12-09 ENCOUNTER — OFFICE VISIT (OUTPATIENT)
Dept: FAMILY MEDICINE | Facility: CLINIC | Age: 72
End: 2022-12-09
Payer: MEDICARE

## 2022-12-09 ENCOUNTER — TELEPHONE (OUTPATIENT)
Dept: FAMILY MEDICINE | Facility: CLINIC | Age: 72
End: 2022-12-09

## 2022-12-09 VITALS
DIASTOLIC BLOOD PRESSURE: 86 MMHG | HEART RATE: 50 BPM | OXYGEN SATURATION: 96 % | BODY MASS INDEX: 42.75 KG/M2 | SYSTOLIC BLOOD PRESSURE: 128 MMHG | WEIGHT: 256.88 LBS

## 2022-12-09 DIAGNOSIS — M54.40 LOW BACK PAIN WITH SCIATICA, SCIATICA LATERALITY UNSPECIFIED, UNSPECIFIED BACK PAIN LATERALITY, UNSPECIFIED CHRONICITY: ICD-10-CM

## 2022-12-09 DIAGNOSIS — R97.8 OTHER ABNORMAL TUMOR MARKERS: ICD-10-CM

## 2022-12-09 DIAGNOSIS — R73.03 PREDIABETES: ICD-10-CM

## 2022-12-09 DIAGNOSIS — Z00.00 ANNUAL PHYSICAL EXAM: Primary | ICD-10-CM

## 2022-12-09 DIAGNOSIS — T75.3XXA SEA SICKNESS, INITIAL ENCOUNTER: ICD-10-CM

## 2022-12-09 DIAGNOSIS — E66.01 MORBID OBESITY: ICD-10-CM

## 2022-12-09 DIAGNOSIS — Z76.0 ENCOUNTER FOR MEDICATION REFILL: ICD-10-CM

## 2022-12-09 PROCEDURE — 99397 PR PREVENTIVE VISIT,EST,65 & OVER: ICD-10-PCS | Mod: S$PBB,GZ,, | Performed by: FAMILY MEDICINE

## 2022-12-09 PROCEDURE — 99397 PER PM REEVAL EST PAT 65+ YR: CPT | Mod: S$PBB,GZ,, | Performed by: FAMILY MEDICINE

## 2022-12-09 PROCEDURE — 99999 PR PBB SHADOW E&M-EST. PATIENT-LVL III: CPT | Mod: PBBFAC,,, | Performed by: FAMILY MEDICINE

## 2022-12-09 PROCEDURE — 99213 OFFICE O/P EST LOW 20 MIN: CPT | Mod: PBBFAC,PN | Performed by: FAMILY MEDICINE

## 2022-12-09 PROCEDURE — 99999 PR PBB SHADOW E&M-EST. PATIENT-LVL III: ICD-10-PCS | Mod: PBBFAC,,, | Performed by: FAMILY MEDICINE

## 2022-12-09 RX ORDER — SCOLOPAMINE TRANSDERMAL SYSTEM 1 MG/1
1 PATCH, EXTENDED RELEASE TRANSDERMAL
Qty: 5 PATCH | Refills: 0 | Status: SHIPPED | OUTPATIENT
Start: 2022-12-09 | End: 2022-12-24

## 2022-12-09 RX ORDER — OMEPRAZOLE 20 MG/1
20 CAPSULE, DELAYED RELEASE ORAL DAILY
Qty: 90 CAPSULE | Refills: 3 | Status: CANCELLED | OUTPATIENT
Start: 2022-12-09

## 2022-12-09 NOTE — PROGRESS NOTES
Patient ID: Yaneth Vera is a 72 y.o. female.    Chief Complaint: Annual Exam, Leg Pain, and Back Pain      Reviewed family, medical, surgical, and social history.    No cp/sob  No change in mental status  No fever  No asymmetrical limb swelling    Objective:      /86 (BP Location: Left arm, Patient Position: Sitting, BP Method: Medium (Manual))   Pulse (!) 50   Wt 116.5 kg (256 lb 14.4 oz)   SpO2 96%   BMI 42.75 kg/m²   RRR, CTAB, s/nt/nd, no c/c/e, non-toxic appearing, no focal weakness  Assessment:       1. Annual physical exam    2. Prediabetes    3. Other abnormal tumor markers    4. Morbid obesity    5. Low back pain with sciatica, sciatica laterality unspecified, unspecified back pain laterality, unspecified chronicity    6. Sea sickness, initial encounter        Plan:       Annual physical exam  -     Hemoglobin A1C; Future; Expected date: 12/09/2022  -     Lipid Panel; Future; Expected date: 12/09/2022  -     TSH; Future; Expected date: 12/09/2022  -     T4, Free; Future; Expected date: 12/09/2022  -     ; Future; Expected date: 12/09/2022  -     Sedimentation rate; Future; Expected date: 12/09/2022  -     C-Reactive Protein; Future; Expected date: 12/09/2022  -     VANDANA Screen w/Reflex; Future; Expected date: 12/09/2022  -     Rheumatoid Factor; Future; Expected date: 12/09/2022  -     Uric Acid; Future; Expected date: 12/09/2022  -     CBC Auto Differential; Future; Expected date: 12/09/2022  -     Comprehensive Metabolic Panel; Future; Expected date: 12/09/2022    Prediabetes  -     Hemoglobin A1C; Future; Expected date: 12/09/2022  -     Lipid Panel; Future; Expected date: 12/09/2022  -     TSH; Future; Expected date: 12/09/2022  -     T4, Free; Future; Expected date: 12/09/2022  -     ; Future; Expected date: 12/09/2022  -     Sedimentation rate; Future; Expected date: 12/09/2022  -     C-Reactive Protein; Future; Expected date: 12/09/2022  -     VANDANA Screen w/Reflex; Future;  Expected date: 12/09/2022  -     Rheumatoid Factor; Future; Expected date: 12/09/2022  -     Uric Acid; Future; Expected date: 12/09/2022  -     CBC Auto Differential; Future; Expected date: 12/09/2022  -     Comprehensive Metabolic Panel; Future; Expected date: 12/09/2022    Other abnormal tumor markers  -     ; Future; Expected date: 12/09/2022    Morbid obesity  -     Hemoglobin A1C; Future; Expected date: 12/09/2022  -     Lipid Panel; Future; Expected date: 12/09/2022  -     TSH; Future; Expected date: 12/09/2022  -     T4, Free; Future; Expected date: 12/09/2022  -     ; Future; Expected date: 12/09/2022  -     Sedimentation rate; Future; Expected date: 12/09/2022  -     C-Reactive Protein; Future; Expected date: 12/09/2022  -     VANDANA Screen w/Reflex; Future; Expected date: 12/09/2022  -     Rheumatoid Factor; Future; Expected date: 12/09/2022  -     Uric Acid; Future; Expected date: 12/09/2022  -     CBC Auto Differential; Future; Expected date: 12/09/2022  -     Comprehensive Metabolic Panel; Future; Expected date: 12/09/2022    Low back pain with sciatica, sciatica laterality unspecified, unspecified back pain laterality, unspecified chronicity  -     X-Ray Lumbar Spine 5 View; Future; Expected date: 12/09/2022    Sea sickness, initial encounter  -     scopolamine (TRANSDERM-SCOP) 1.3-1.5 mg (1 mg over 3 days); Place 1 patch onto the skin every 72 hours. for 15 days  Dispense: 5 patch; Refill: 0            Continue current medicines, any changes noted above  Check labs  X-ray  Labs, radiology studies, and procedures/notes from the last 3 months were reviewed.    Risks, benefits, and side effects were discussed with the patient. All questions were answered to the fullest satisfaction of the patient, and pt verbalized understanding and agreement to treatment plan. Pt was to call with any new or worsening symptoms, or present to the ER.

## 2023-01-10 ENCOUNTER — HOSPITAL ENCOUNTER (OUTPATIENT)
Dept: RADIOLOGY | Facility: HOSPITAL | Age: 73
Discharge: HOME | End: 2023-01-10
Attending: FAMILY MEDICINE
Payer: MEDICARE

## 2023-01-10 DIAGNOSIS — M54.40 LOW BACK PAIN WITH SCIATICA, SCIATICA LATERALITY UNSPECIFIED, UNSPECIFIED BACK PAIN LATERALITY, UNSPECIFIED CHRONICITY: ICD-10-CM

## 2023-01-10 PROCEDURE — 72110 XR LUMBAR SPINE COMPLETE 5 VIEW: ICD-10-PCS | Mod: 26,,, | Performed by: RADIOLOGY

## 2023-01-10 PROCEDURE — 72110 X-RAY EXAM L-2 SPINE 4/>VWS: CPT | Mod: TC,PN

## 2023-01-10 PROCEDURE — 72110 X-RAY EXAM L-2 SPINE 4/>VWS: CPT | Mod: 26,,, | Performed by: RADIOLOGY

## 2023-01-12 ENCOUNTER — PATIENT MESSAGE (OUTPATIENT)
Dept: FAMILY MEDICINE | Facility: CLINIC | Age: 73
End: 2023-01-12
Payer: MEDICARE

## 2023-01-12 ENCOUNTER — HOSPITAL ENCOUNTER (OUTPATIENT)
Dept: RADIOLOGY | Facility: HOSPITAL | Age: 73
Discharge: HOME | End: 2023-01-12
Attending: FAMILY MEDICINE
Payer: MEDICARE

## 2023-01-12 DIAGNOSIS — M25.569 KNEE PAIN, UNSPECIFIED CHRONICITY, UNSPECIFIED LATERALITY: ICD-10-CM

## 2023-01-12 PROCEDURE — 73562 X-RAY EXAM OF KNEE 3: CPT | Mod: 26,RT,, | Performed by: RADIOLOGY

## 2023-01-12 PROCEDURE — 73562 XR KNEE 3 VIEW RIGHT: ICD-10-PCS | Mod: 26,RT,, | Performed by: RADIOLOGY

## 2023-01-12 PROCEDURE — 73562 X-RAY EXAM OF KNEE 3: CPT | Mod: TC,PN,RT

## 2023-01-25 ENCOUNTER — LAB VISIT (OUTPATIENT)
Dept: LAB | Facility: HOSPITAL | Age: 73
End: 2023-01-25
Attending: FAMILY MEDICINE
Payer: MEDICARE

## 2023-01-25 DIAGNOSIS — Z00.00 ANNUAL PHYSICAL EXAM: ICD-10-CM

## 2023-01-25 DIAGNOSIS — R97.8 OTHER ABNORMAL TUMOR MARKERS: ICD-10-CM

## 2023-01-25 DIAGNOSIS — R73.03 PREDIABETES: ICD-10-CM

## 2023-01-25 DIAGNOSIS — E66.01 MORBID OBESITY: ICD-10-CM

## 2023-01-25 LAB
ALBUMIN SERPL BCP-MCNC: 3.4 G/DL (ref 3.5–5.2)
ALP SERPL-CCNC: 108 U/L (ref 55–135)
ALT SERPL W/O P-5'-P-CCNC: 25 U/L (ref 10–44)
ANION GAP SERPL CALC-SCNC: 8 MMOL/L (ref 8–16)
AST SERPL-CCNC: 21 U/L (ref 10–40)
BASOPHILS # BLD AUTO: 0.07 K/UL (ref 0–0.2)
BASOPHILS NFR BLD: 1.1 % (ref 0–1.9)
BILIRUB SERPL-MCNC: 0.7 MG/DL (ref 0.1–1)
BUN SERPL-MCNC: 18 MG/DL (ref 8–23)
CALCIUM SERPL-MCNC: 8.8 MG/DL (ref 8.7–10.5)
CANCER AG125 SERPL-ACNC: 9 U/ML (ref 0–30)
CHLORIDE SERPL-SCNC: 109 MMOL/L (ref 95–110)
CHOLEST SERPL-MCNC: 181 MG/DL (ref 120–199)
CHOLEST/HDLC SERPL: 3.4 {RATIO} (ref 2–5)
CO2 SERPL-SCNC: 26 MMOL/L (ref 23–29)
CREAT SERPL-MCNC: 0.8 MG/DL (ref 0.5–1.4)
CRP SERPL-MCNC: 9.9 MG/L (ref 0–8.2)
DIFFERENTIAL METHOD: ABNORMAL
EOSINOPHIL # BLD AUTO: 0.1 K/UL (ref 0–0.5)
EOSINOPHIL NFR BLD: 1.5 % (ref 0–8)
ERYTHROCYTE [DISTWIDTH] IN BLOOD BY AUTOMATED COUNT: 13.7 % (ref 11.5–14.5)
ERYTHROCYTE [SEDIMENTATION RATE] IN BLOOD BY WESTERGREN METHOD: 28 MM/HR (ref 0–20)
EST. GFR  (NO RACE VARIABLE): >60 ML/MIN/1.73 M^2
ESTIMATED AVG GLUCOSE: 134 MG/DL (ref 68–131)
GLUCOSE SERPL-MCNC: 130 MG/DL (ref 70–110)
HBA1C MFR BLD: 6.3 % (ref 4–5.6)
HCT VFR BLD AUTO: 39.6 % (ref 37–48.5)
HDLC SERPL-MCNC: 54 MG/DL (ref 40–75)
HDLC SERPL: 29.8 % (ref 20–50)
HGB BLD-MCNC: 12.7 G/DL (ref 12–16)
IMM GRANULOCYTES # BLD AUTO: 0.07 K/UL (ref 0–0.04)
IMM GRANULOCYTES NFR BLD AUTO: 1.1 % (ref 0–0.5)
LDLC SERPL CALC-MCNC: 110.4 MG/DL (ref 63–159)
LYMPHOCYTES # BLD AUTO: 1.6 K/UL (ref 1–4.8)
LYMPHOCYTES NFR BLD: 24 % (ref 18–48)
MCH RBC QN AUTO: 28.9 PG (ref 27–31)
MCHC RBC AUTO-ENTMCNC: 32.1 G/DL (ref 32–36)
MCV RBC AUTO: 90 FL (ref 82–98)
MONOCYTES # BLD AUTO: 0.4 K/UL (ref 0.3–1)
MONOCYTES NFR BLD: 6.6 % (ref 4–15)
NEUTROPHILS # BLD AUTO: 4.3 K/UL (ref 1.8–7.7)
NEUTROPHILS NFR BLD: 65.7 % (ref 38–73)
NONHDLC SERPL-MCNC: 127 MG/DL
NRBC BLD-RTO: 0 /100 WBC
PLATELET # BLD AUTO: 198 K/UL (ref 150–450)
PMV BLD AUTO: 9.6 FL (ref 9.2–12.9)
POTASSIUM SERPL-SCNC: 3.9 MMOL/L (ref 3.5–5.1)
PROT SERPL-MCNC: 6.8 G/DL (ref 6–8.4)
RBC # BLD AUTO: 4.4 M/UL (ref 4–5.4)
RHEUMATOID FACT SERPL-ACNC: <13 IU/ML (ref 0–15)
SODIUM SERPL-SCNC: 143 MMOL/L (ref 136–145)
T4 FREE SERPL-MCNC: 1.13 NG/DL (ref 0.71–1.51)
TRIGL SERPL-MCNC: 83 MG/DL (ref 30–150)
TSH SERPL DL<=0.005 MIU/L-ACNC: 4.73 UIU/ML (ref 0.4–4)
URATE SERPL-MCNC: 6.9 MG/DL (ref 2.4–5.7)
WBC # BLD AUTO: 6.49 K/UL (ref 3.9–12.7)

## 2023-01-25 PROCEDURE — 85651 RBC SED RATE NONAUTOMATED: CPT | Performed by: FAMILY MEDICINE

## 2023-01-25 PROCEDURE — 86038 ANTINUCLEAR ANTIBODIES: CPT | Performed by: FAMILY MEDICINE

## 2023-01-25 PROCEDURE — 80053 COMPREHEN METABOLIC PANEL: CPT | Performed by: FAMILY MEDICINE

## 2023-01-25 PROCEDURE — 84443 ASSAY THYROID STIM HORMONE: CPT | Performed by: FAMILY MEDICINE

## 2023-01-25 PROCEDURE — 80061 LIPID PANEL: CPT | Performed by: FAMILY MEDICINE

## 2023-01-25 PROCEDURE — 86431 RHEUMATOID FACTOR QUANT: CPT | Performed by: FAMILY MEDICINE

## 2023-01-25 PROCEDURE — 85025 COMPLETE CBC W/AUTO DIFF WBC: CPT | Performed by: FAMILY MEDICINE

## 2023-01-25 PROCEDURE — 84439 ASSAY OF FREE THYROXINE: CPT | Performed by: FAMILY MEDICINE

## 2023-01-25 PROCEDURE — 86304 IMMUNOASSAY TUMOR CA 125: CPT | Performed by: FAMILY MEDICINE

## 2023-01-25 PROCEDURE — 36415 COLL VENOUS BLD VENIPUNCTURE: CPT | Performed by: FAMILY MEDICINE

## 2023-01-25 PROCEDURE — 86140 C-REACTIVE PROTEIN: CPT | Performed by: FAMILY MEDICINE

## 2023-01-25 PROCEDURE — 83036 HEMOGLOBIN GLYCOSYLATED A1C: CPT | Performed by: FAMILY MEDICINE

## 2023-01-25 PROCEDURE — 84550 ASSAY OF BLOOD/URIC ACID: CPT | Performed by: FAMILY MEDICINE

## 2023-01-26 LAB — ANA SER QL IF: NORMAL

## 2023-02-15 ENCOUNTER — PATIENT MESSAGE (OUTPATIENT)
Dept: FAMILY MEDICINE | Facility: CLINIC | Age: 73
End: 2023-02-15
Payer: MEDICARE

## 2023-03-28 DIAGNOSIS — Z12.31 OTHER SCREENING MAMMOGRAM: Primary | ICD-10-CM

## 2023-04-11 ENCOUNTER — HOSPITAL ENCOUNTER (OUTPATIENT)
Dept: RADIOLOGY | Facility: HOSPITAL | Age: 73
Discharge: HOME | End: 2023-04-11
Attending: OBSTETRICS & GYNECOLOGY
Payer: MEDICARE

## 2023-04-11 DIAGNOSIS — Z12.31 OTHER SCREENING MAMMOGRAM: ICD-10-CM

## 2023-04-11 PROCEDURE — 77067 SCR MAMMO BI INCL CAD: CPT | Mod: TC,PO

## 2023-05-14 DIAGNOSIS — Z76.0 ENCOUNTER FOR MEDICATION REFILL: ICD-10-CM

## 2023-05-14 RX ORDER — OMEPRAZOLE 20 MG/1
CAPSULE, DELAYED RELEASE ORAL
Qty: 90 CAPSULE | Refills: 1 | Status: SHIPPED | OUTPATIENT
Start: 2023-05-14 | End: 2024-01-04 | Stop reason: SDUPTHER

## 2023-05-14 NOTE — TELEPHONE ENCOUNTER
Refill Authorization Note     Refill Decision Note   Yaneth Vera  is requesting a refill authorization.  Brief Assessment and Rationale for Refill:  Approve     Medication Therapy Plan:       Medication Reconciliation Completed: No   Comments:     No Care Gaps recommended.     Note composed:2:40 PM 05/14/2023

## 2023-05-14 NOTE — TELEPHONE ENCOUNTER
Care Due:                  Date            Visit Type   Department     Provider  --------------------------------------------------------------------------------    Last Visit: None Found      None         None Found  Next Visit: None Scheduled  None         None Found                                                            Last  Test          Frequency    Reason                     Performed    Due Date  --------------------------------------------------------------------------------    Office Visit  12 months..  omeprazole...............  Not Found    Overdue    [employer] Embedded Care Due Messages. Reference number: [id number].   5/14/2023 7:36:19 AM CDT

## 2023-07-30 ENCOUNTER — OFFICE VISIT (OUTPATIENT)
Dept: URGENT CARE | Facility: CLINIC | Age: 73
End: 2023-07-30
Payer: MEDICARE

## 2023-07-30 VITALS
HEIGHT: 65 IN | RESPIRATION RATE: 18 BRPM | HEART RATE: 86 BPM | OXYGEN SATURATION: 96 % | DIASTOLIC BLOOD PRESSURE: 78 MMHG | BODY MASS INDEX: 41.65 KG/M2 | SYSTOLIC BLOOD PRESSURE: 176 MMHG | WEIGHT: 250 LBS | TEMPERATURE: 98 F

## 2023-07-30 DIAGNOSIS — R05.9 COUGH, UNSPECIFIED TYPE: ICD-10-CM

## 2023-07-30 DIAGNOSIS — J06.9 BACTERIAL UPPER RESPIRATORY INFECTION: ICD-10-CM

## 2023-07-30 DIAGNOSIS — R09.81 NASAL CONGESTION: Primary | ICD-10-CM

## 2023-07-30 DIAGNOSIS — B96.89 BACTERIAL UPPER RESPIRATORY INFECTION: ICD-10-CM

## 2023-07-30 LAB
CTP QC/QA: YES
SARS-COV-2 AG RESP QL IA.RAPID: NEGATIVE

## 2023-07-30 PROCEDURE — 87811 SARS CORONAVIRUS 2 ANTIGEN POCT, MANUAL READ: ICD-10-PCS | Mod: QW,S$GLB,,

## 2023-07-30 PROCEDURE — 99213 OFFICE O/P EST LOW 20 MIN: CPT | Mod: S$GLB,,,

## 2023-07-30 PROCEDURE — 99213 PR OFFICE/OUTPT VISIT, EST, LEVL III, 20-29 MIN: ICD-10-PCS | Mod: S$GLB,,,

## 2023-07-30 PROCEDURE — 87811 SARS-COV-2 COVID19 W/OPTIC: CPT | Mod: QW,S$GLB,,

## 2023-07-30 RX ORDER — PROMETHAZINE HYDROCHLORIDE AND DEXTROMETHORPHAN HYDROBROMIDE 6.25; 15 MG/5ML; MG/5ML
5 SYRUP ORAL NIGHTLY PRN
Qty: 118 ML | Refills: 0 | Status: SHIPPED | OUTPATIENT
Start: 2023-07-30 | End: 2024-03-04

## 2023-07-30 RX ORDER — AZITHROMYCIN 250 MG/1
TABLET, FILM COATED ORAL
Qty: 6 TABLET | Refills: 0 | Status: SHIPPED | OUTPATIENT
Start: 2023-07-30 | End: 2023-08-04

## 2023-07-30 RX ORDER — PREDNISONE 10 MG/1
TABLET ORAL
Qty: 8 TABLET | Refills: 0 | Status: SHIPPED | OUTPATIENT
Start: 2023-07-30 | End: 2024-03-04

## 2023-07-30 NOTE — PATIENT INSTRUCTIONS
Please return here or go to the Emergency Department for any concerns or worsening of condition.  Please drink plenty of fluids.  Please get plenty of rest.  If you were prescribed antibiotics, please take them to completion.  If you were given wait & see antibiotics, please wait 5-7 days before taking them, and only take them if your symptoms have worsened or not improved.  If you do begin taking the antibiotics, please take them to completion.  If you were given a steroid shot in the clinic and have also been given a prescription for a steroid such as Prednisone or a Medrol Dose Pack, please begin taking them tomorrow.  If you do not have Hypertension or any history of palpitations, it is ok to take over the counter Sudafed or Mucinex D or Allegra-D or Claritin-D or Zyrtec-D.  If you do take one of the above, it is ok to combine that with plain over the counter Mucinex or Allegra or Claritin or Zyrtec.  If for example you are taking Zyrtec -D, you can combine that with Mucinex, but not Mucinex-D.  If you are taking Mucinex-D, you can combine that with plain Allegra or Claritin or Zyrtec.   If you do have Hypertension or palpitations, it is safe to take Coricidin HBP for relief of sinus symptoms.  If not allergic, please take over the counter Tylenol (Acetaminophen) and/or Motrin (Ibuprofen) as directed for control of pain and/or fever.  Please follow up with your primary care doctor or specialist as needed.    If you  smoke, please stop smoking.    alert and awake

## 2023-07-30 NOTE — PROGRESS NOTES
"Subjective:       Patient ID: Yaneth Vear is a 73 y.o. female.    Vitals:  height is 5' 5" (1.651 m) and weight is 113.4 kg (250 lb). Her oral temperature is 98.1 °F (36.7 °C). Her blood pressure is 176/78 (abnormal) and her pulse is 86. Her respiration is 18 and oxygen saturation is 96%.     Chief Complaint: Cough (X 1 week with chest congestion, wheezing and cough with greenish yellow sputum that seems to be resolving.  No head congestion, no fever.)    This is a 73 y.o. female who presents today with a chief complaint of X 1 week with chest congestion, wheezing and cough with greenish yellow sputum that seems to be resolving.  No head congestion, no fever.  Patient presents with:  Cough: X 1 week with chest congestion, wheezing and cough with greenish yellow sputum that seems to be resolving.  No head congestion, no fever.         Cough  This is a new problem. The current episode started in the past 7 days. The problem has been unchanged. The cough is Productive of sputum. Associated symptoms include postnasal drip, a sore throat and wheezing. Treatments tried: mucinex and zycam. The treatment provided no relief.       Constitution: Negative.   HENT:  Positive for congestion, postnasal drip and sore throat.    Neck: neck negative.   Cardiovascular: Negative.    Eyes: Negative.    Respiratory:  Positive for cough and wheezing.    Gastrointestinal: Negative.    Endocrine: negative.   Genitourinary: Negative.    Musculoskeletal: Negative.    Skin: Negative.    Allergic/Immunologic: Negative.    Neurological: Negative.    Hematologic/Lymphatic: Negative.    Psychiatric/Behavioral: Negative.             Objective:      Physical Exam   Constitutional: She is oriented to person, place, and time. obesity  HENT:   Head: Normocephalic and atraumatic.   Ears:   Right Ear: Tympanic membrane, external ear and ear canal normal.   Left Ear: Tympanic membrane, external ear and ear canal normal.   Nose: Congestion present. "   Mouth/Throat: Posterior oropharyngeal erythema present.   Eyes: Conjunctivae are normal. Pupils are equal, round, and reactive to light. Extraocular movement intact   Neck: Neck supple.   Cardiovascular: Normal rate, regular rhythm, normal heart sounds and normal pulses.   Pulmonary/Chest: Effort normal and breath sounds normal.   Abdominal: Normal appearance.   Musculoskeletal: Normal range of motion.         General: Normal range of motion.   Neurological: no focal deficit. She is alert, oriented to person, place, and time and at baseline.   Skin: Skin is warm. Capillary refill takes less than 2 seconds.   Psychiatric: Her behavior is normal. Mood, judgment and thought content normal.   Nursing note and vitals reviewed.        Past medical history and current medications reviewed.       Assessment:     Results for orders placed or performed in visit on 07/30/23   SARS Coronavirus 2 Antigen, POCT Manual Read   Result Value Ref Range    SARS Coronavirus 2 Antigen Negative Negative     Acceptable Yes              1. Nasal congestion    2. Cough, unspecified type    3. Bacterial upper respiratory infection              Plan:         Nasal congestion  -     SARS Coronavirus 2 Antigen, POCT Manual Read  -     azithromycin (Z-SANTOSH) 250 MG tablet; Take 2 tablets by mouth on day 1; Take 1 tablet by mouth on days 2-5  Dispense: 6 tablet; Refill: 0  -     predniSONE (DELTASONE) 10 MG tablet; Take 20 mg on day one then take 10 mg on day 2-7  Dispense: 8 tablet; Refill: 0  -     promethazine-dextromethorphan (PROMETHAZINE-DM) 6.25-15 mg/5 mL Syrp; Take 5 mLs by mouth nightly as needed (cough).  Dispense: 118 mL; Refill: 0    Cough, unspecified type  -     SARS Coronavirus 2 Antigen, POCT Manual Read  -     azithromycin (Z-SANTOSH) 250 MG tablet; Take 2 tablets by mouth on day 1; Take 1 tablet by mouth on days 2-5  Dispense: 6 tablet; Refill: 0  -     predniSONE (DELTASONE) 10 MG tablet; Take 20 mg on day one then  take 10 mg on day 2-7  Dispense: 8 tablet; Refill: 0  -     promethazine-dextromethorphan (PROMETHAZINE-DM) 6.25-15 mg/5 mL Syrp; Take 5 mLs by mouth nightly as needed (cough).  Dispense: 118 mL; Refill: 0    Bacterial upper respiratory infection  -     azithromycin (Z-SANTOSH) 250 MG tablet; Take 2 tablets by mouth on day 1; Take 1 tablet by mouth on days 2-5  Dispense: 6 tablet; Refill: 0  -     predniSONE (DELTASONE) 10 MG tablet; Take 20 mg on day one then take 10 mg on day 2-7  Dispense: 8 tablet; Refill: 0  -     promethazine-dextromethorphan (PROMETHAZINE-DM) 6.25-15 mg/5 mL Syrp; Take 5 mLs by mouth nightly as needed (cough).  Dispense: 118 mL; Refill: 0             Patient Instructions   Please return here or go to the Emergency Department for any concerns or worsening of condition.  Please drink plenty of fluids.  Please get plenty of rest.  If you were prescribed antibiotics, please take them to completion.  If you were given wait & see antibiotics, please wait 5-7 days before taking them, and only take them if your symptoms have worsened or not improved.  If you do begin taking the antibiotics, please take them to completion.  If you were given a steroid shot in the clinic and have also been given a prescription for a steroid such as Prednisone or a Medrol Dose Pack, please begin taking them tomorrow.  If you do not have Hypertension or any history of palpitations, it is ok to take over the counter Sudafed or Mucinex D or Allegra-D or Claritin-D or Zyrtec-D.  If you do take one of the above, it is ok to combine that with plain over the counter Mucinex or Allegra or Claritin or Zyrtec.  If for example you are taking Zyrtec -D, you can combine that with Mucinex, but not Mucinex-D.  If you are taking Mucinex-D, you can combine that with plain Allegra or Claritin or Zyrtec.   If you do have Hypertension or palpitations, it is safe to take Coricidin HBP for relief of sinus symptoms.  If not allergic, please take  over the counter Tylenol (Acetaminophen) and/or Motrin (Ibuprofen) as directed for control of pain and/or fever.  Please follow up with your primary care doctor or specialist as needed.    If you  smoke, please stop smoking.

## 2023-08-07 ENCOUNTER — TELEPHONE (OUTPATIENT)
Dept: FAMILY MEDICINE | Facility: CLINIC | Age: 73
End: 2023-08-07
Payer: MEDICARE

## 2023-08-07 RX ORDER — AMOXICILLIN 875 MG/1
875 TABLET, FILM COATED ORAL EVERY 12 HOURS
Qty: 20 TABLET | Refills: 0 | Status: SHIPPED | OUTPATIENT
Start: 2023-08-07 | End: 2024-03-04

## 2023-08-07 NOTE — TELEPHONE ENCOUNTER
----- Message from Allison Lopez sent at 8/7/2023 10:52 AM CDT -----  Contact: Self  Type:  Needs Medical Advice    Who Called: Pt  Symptoms (please be specific): chest and head congestion, hard to breathe   How long has patient had these symptoms:  2 wk of cold, 1 wk of taking med  Pharmacy name and phone #:    [facility]/pharmacy #[id number] - [place], [place] - [address]  [address] [place] [address]  Phone: [phone] Fax: [phone]    Would the patient rather a call back or a response via [facility]?  C   Best Call Back Number:  [phone]    Additional Information:  Pt was seen at Urgent Care Sunday, 7/30 for symptoms and was given:  azithromycin (Z-SANTOSH) 250 MG tablet  promethazine-dextromethorphan (PROMETHAZINE-DM) 6.25-15 mg/5 mL Syrp  predniSONE (DELTASONE) 10 MG tablet    Pt states that after taking the above, with the last dose of z-pack on Saturday, she's still experience discomfort from the symptoms, is there something that can be prescribed?    Please call to advise... Thank you...

## 2023-09-29 ENCOUNTER — PATIENT MESSAGE (OUTPATIENT)
Dept: FAMILY MEDICINE | Facility: CLINIC | Age: 73
End: 2023-09-29
Payer: MEDICARE

## 2023-09-29 DIAGNOSIS — Z87.898 HISTORY OF MOTION SICKNESS: Primary | ICD-10-CM

## 2023-09-29 RX ORDER — SCOLOPAMINE TRANSDERMAL SYSTEM 1 MG/1
1 PATCH, EXTENDED RELEASE TRANSDERMAL
Qty: 10 PATCH | Refills: 0 | Status: SHIPPED | OUTPATIENT
Start: 2023-09-29 | End: 2023-10-21

## 2023-10-01 ENCOUNTER — PATIENT MESSAGE (OUTPATIENT)
Dept: FAMILY MEDICINE | Facility: CLINIC | Age: 73
End: 2023-10-01
Payer: MEDICARE

## 2023-10-01 DIAGNOSIS — R11.0 NAUSEA: Primary | ICD-10-CM

## 2023-10-02 RX ORDER — ONDANSETRON 4 MG/1
4 TABLET, ORALLY DISINTEGRATING ORAL EVERY 8 HOURS PRN
Qty: 20 TABLET | Refills: 0 | Status: SHIPPED | OUTPATIENT
Start: 2023-10-02 | End: 2024-03-04

## 2023-10-03 ENCOUNTER — PATIENT MESSAGE (OUTPATIENT)
Dept: ADMINISTRATIVE | Facility: HOSPITAL | Age: 73
End: 2023-10-03
Payer: MEDICARE

## 2023-10-17 ENCOUNTER — PATIENT MESSAGE (OUTPATIENT)
Dept: ADMINISTRATIVE | Facility: HOSPITAL | Age: 73
End: 2023-10-17
Payer: MEDICARE

## 2024-01-04 DIAGNOSIS — Z76.0 ENCOUNTER FOR MEDICATION REFILL: ICD-10-CM

## 2024-01-04 RX ORDER — OMEPRAZOLE 20 MG/1
20 CAPSULE, DELAYED RELEASE ORAL DAILY
Qty: 30 CAPSULE | Refills: 0 | Status: SHIPPED | OUTPATIENT
Start: 2024-01-04

## 2024-01-04 NOTE — TELEPHONE ENCOUNTER
----- Message from Kev Castellanos sent at 1/4/2024 11:20 AM CST -----  Regarding: Medication Request  Pt came into the clinic and is requesting a printed prescription be made available for the following medication listed below. So she can provide it to her new mail order pharmacy.    omeprazole (PRILOSEC) 20 MG capsule 90 capsule    Pt is a former [facility] pt. She was made aware that the new provider she has selected may require she be seen prior to the prescription being provided. The pt request she be notified once completed and or if an appt will be required.

## 2024-01-04 NOTE — TELEPHONE ENCOUNTER
Dear Dr. Thompson,     In the absence of Cesar [facility] MD, can you please address this patients concern or request?    Thank you,  Irena Thompson LPN

## 2024-02-09 ENCOUNTER — TELEPHONE (OUTPATIENT)
Dept: FAMILY MEDICINE | Facility: CLINIC | Age: 74
End: 2024-02-09

## 2024-03-01 ENCOUNTER — TELEPHONE (OUTPATIENT)
Dept: FAMILY MEDICINE | Facility: CLINIC | Age: 74
End: 2024-03-01
Payer: MEDICARE

## 2024-03-01 NOTE — TELEPHONE ENCOUNTER
----- Message from Ruthy John sent at 3/1/2024 12:28 PM CST -----  Contact: pt  Type: Needs Medical Advice  Who Called:  pt  Best Call Back Number: [phone]    Additional Information: Pt is calling the office trying to see about labs pt went to appt yesterday there were no labs logged in chart but an appt was made pt was sent home.Please call back and advise.

## 2024-03-01 NOTE — TELEPHONE ENCOUNTER
Spoke with patient and advised her to come in fasting for her annual check up so labs can be completed if needed. Verbalized understanding.

## 2024-03-04 ENCOUNTER — CLINICAL SUPPORT (OUTPATIENT)
Dept: FAMILY MEDICINE | Facility: CLINIC | Age: 74
End: 2024-03-04
Payer: MEDICARE

## 2024-03-04 ENCOUNTER — OFFICE VISIT (OUTPATIENT)
Dept: FAMILY MEDICINE | Facility: CLINIC | Age: 74
End: 2024-03-04
Payer: MEDICARE

## 2024-03-04 VITALS
BODY MASS INDEX: 42.49 KG/M2 | DIASTOLIC BLOOD PRESSURE: 88 MMHG | OXYGEN SATURATION: 99 % | HEART RATE: 47 BPM | HEIGHT: 65 IN | WEIGHT: 255.06 LBS | SYSTOLIC BLOOD PRESSURE: 138 MMHG

## 2024-03-04 DIAGNOSIS — Z76.0 ENCOUNTER FOR MEDICATION REFILL: ICD-10-CM

## 2024-03-04 DIAGNOSIS — M53.3 SI (SACROILIAC) JOINT DYSFUNCTION: ICD-10-CM

## 2024-03-04 DIAGNOSIS — M10.9 GOUT, UNSPECIFIED CAUSE, UNSPECIFIED CHRONICITY, UNSPECIFIED SITE: ICD-10-CM

## 2024-03-04 DIAGNOSIS — Z12.73 SCREENING FOR OVARIAN CANCER: ICD-10-CM

## 2024-03-04 DIAGNOSIS — Z78.9 NONSMOKER: ICD-10-CM

## 2024-03-04 DIAGNOSIS — E03.8 SUBCLINICAL HYPOTHYROIDISM: ICD-10-CM

## 2024-03-04 DIAGNOSIS — R73.03 PREDIABETES: ICD-10-CM

## 2024-03-04 DIAGNOSIS — E66.01 MORBID OBESITY: ICD-10-CM

## 2024-03-04 DIAGNOSIS — R73.03 PREDIABETES: Primary | ICD-10-CM
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
## 2024-03-04 LAB
ALBUMIN SERPL BCP-MCNC: 3.7 G/DL (ref 3.5–5.2)
ALP SERPL-CCNC: 99 U/L (ref 55–135)
ALT SERPL W/O P-5'-P-CCNC: 22 U/L (ref 10–44)
ANION GAP SERPL CALC-SCNC: 9 MMOL/L (ref 8–16)
AST SERPL-CCNC: 26 U/L (ref 10–40)
BILIRUB SERPL-MCNC: 0.9 MG/DL (ref 0.1–1)
BUN SERPL-MCNC: 18 MG/DL (ref 8–23)
CALCIUM SERPL-MCNC: 8.8 MG/DL (ref 8.7–10.5)
CHLORIDE SERPL-SCNC: 104 MMOL/L (ref 95–110)
CO2 SERPL-SCNC: 28 MMOL/L (ref 23–29)
CREAT SERPL-MCNC: 0.8 MG/DL (ref 0.5–1.4)
EST. GFR  (NO RACE VARIABLE): >60 ML/MIN/1.73 M^2
ESTIMATED AVG GLUCOSE: 151 MG/DL (ref 68–131)
GLUCOSE SERPL-MCNC: 112 MG/DL (ref 70–110)
HBA1C MFR BLD: 6.9 % (ref 4–5.6)
POTASSIUM SERPL-SCNC: 4.4 MMOL/L (ref 3.5–5.1)
PROT SERPL-MCNC: 7.1 G/DL (ref 6–8.4)
SODIUM SERPL-SCNC: 141 MMOL/L (ref 136–145)
T4 FREE SERPL-MCNC: 1.06 NG/DL (ref 0.71–1.51)
TSH SERPL DL<=0.005 MIU/L-ACNC: 3.82 UIU/ML (ref 0.4–4)
URATE SERPL-MCNC: 6.6 MG/DL (ref 2.4–5.7)

## 2024-03-04 PROCEDURE — 80053 COMPREHEN METABOLIC PANEL: CPT | Performed by: FAMILY MEDICINE

## 2024-03-04 PROCEDURE — 99999 PR PBB SHADOW E&M-EST. PATIENT-LVL IV: CPT | Mod: PBBFAC,,, | Performed by: FAMILY MEDICINE

## 2024-03-04 PROCEDURE — 99214 OFFICE O/P EST MOD 30 MIN: CPT | Mod: S$PBB,,, | Performed by: FAMILY MEDICINE

## 2024-03-04 PROCEDURE — 84550 ASSAY OF BLOOD/URIC ACID: CPT | Performed by: FAMILY MEDICINE

## 2024-03-04 PROCEDURE — 83036 HEMOGLOBIN GLYCOSYLATED A1C: CPT | Performed by: FAMILY MEDICINE

## 2024-03-04 PROCEDURE — 84439 ASSAY OF FREE THYROXINE: CPT | Performed by: FAMILY MEDICINE

## 2024-03-04 PROCEDURE — 84443 ASSAY THYROID STIM HORMONE: CPT | Performed by: FAMILY MEDICINE

## 2024-03-04 PROCEDURE — 86304 IMMUNOASSAY TUMOR CA 125: CPT | Performed by: FAMILY MEDICINE

## 2024-03-04 PROCEDURE — 99214 OFFICE O/P EST MOD 30 MIN: CPT | Mod: PBBFAC,PN | Performed by: FAMILY MEDICINE

## 2024-03-04 RX ORDER — OMEPRAZOLE 20 MG/1
20 CAPSULE, DELAYED RELEASE ORAL DAILY
COMMUNITY
End: 2024-03-04

## 2024-03-04 NOTE — TELEPHONE ENCOUNTER
No care due was identified.  [facility] Embedded Care Due Messages. Reference number: [id number].   3/04/2024 3:09:41 PM CST

## 2024-03-04 NOTE — PROGRESS NOTES
Subjective     Patient ID: Yaneth Vera is a 73 y.o. female.    Chief Complaint: Establish Care    Patient here to establish care.  States she may follow in [facility] in the future    Prediabetes, obesity, mixed hyperlipidemia:  Currently working on diet and exercise modifications    Subclinical hypothyroidism:  Patient asymptomatic    Patient states she has a family history of ovarian cancer therefore she gets an ultrasound done annually by her gyn and a  level drawn.    Had cataract surgery that went bed so follows with eye doctor for glaucoma b/l. States her balance and depth perception, so vision is distorted causing her to have difficulty stepping on and off curbs.     Review of systems positive for left-sided low back/hip pain.  States her former PCP jhonny some labs to try to rule out other causes and reasons for the pain, but they came back negative.  States she just has pain in these to specific spots in her low back.  Patient has not done any type of physical therapy for this, but is willing to do so.  Patient denies any type of radicular associated symptoms      Review of Systems   Constitutional:  Negative for activity change, appetite change, chills, diaphoresis, fatigue, fever and unexpected weight change.   Respiratory:  Negative for cough, choking and chest tightness.    Cardiovascular:  Negative for chest pain, palpitations, leg swelling and claudication.   Gastrointestinal:  Negative for abdominal pain, change in bowel habit, constipation, diarrhea, nausea and vomiting.   Musculoskeletal:  Positive for back pain.   Psychiatric/Behavioral:  Negative for dysphoric mood, self-injury, sleep disturbance and suicidal ideas. The patient is not nervous/anxious.           Objective     Physical Exam  Vitals reviewed.   Constitutional:       General: She is not in acute distress.     Appearance: Normal appearance. She is not ill-appearing or toxic-appearing.   Cardiovascular:      Rate and Rhythm:  Normal rate and regular rhythm.      Heart sounds: Normal heart sounds.   Pulmonary:      Effort: Pulmonary effort is normal.      Breath sounds: Normal breath sounds.   Abdominal:      General: Abdomen is flat. Bowel sounds are normal.      Palpations: Abdomen is soft.      Tenderness: There is no abdominal tenderness.   Musculoskeletal:         General: Tenderness present.        Arms:       Right lower leg: No edema.      Left lower leg: No edema.      Comments: SI joint tenderness   Neurological:      General: No focal deficit present.      Mental Status: She is alert and oriented to person, place, and time.   Psychiatric:         Mood and Affect: Mood normal.         Behavior: Behavior normal.            Assessment and Plan     1. Prediabetes  -     Hemoglobin A1C; Future; Expected date: 03/04/2024  -     Comprehensive Metabolic Panel; Future; Expected date: 03/04/2024    2. Morbid obesity    3. Subclinical hypothyroidism  -     TSH; Future; Expected date: 03/04/2024  -     T4, Free; Future; Expected date: 03/04/2024    4. Gout, unspecified cause, unspecified chronicity, unspecified site  -     Uric Acid; Future; Expected date: 03/04/2024    5. Screening for ovarian cancer  -     ; Future; Expected date: 03/04/2024    6. SI (sacroiliac) joint dysfunction  -     Ambulatory referral/consult to Physical/Occupational Therapy; Future; Expected date: 03/11/2024    7. Injury caused by bending, sequela  -     Ambulatory referral/consult to Physical/Occupational Therapy; Future; Expected date: 03/11/2024    8. Nonsmoker      We will check labs today   Discussed diet exercise modifications  Will refer to physical therapy   Risks, benefits, and side effects were discussed with the patient. All questions were answered to the fullest satisfaction of the patient, and pt verbalized understanding and agreement to treatment plan. Pt was to call with any new or worsening symptoms, or present to the ER.  To clinic 3-6  months pending labs       Jazzy Gallagher MD  Family Medicine Physician   [facility]     This note was created using [employer] voice recognition software that occasionally may misinterpret phrases or words.

## 2024-03-05 LAB — CANCER AG125 SERPL-ACNC: 8 U/ML (ref 0–30)

## 2024-03-05 RX ORDER — OMEPRAZOLE 20 MG/1
20 CAPSULE, DELAYED RELEASE ORAL DAILY
Qty: 90 CAPSULE | Refills: 3 | Status: SHIPPED | OUTPATIENT
Start: 2024-03-05

## 2024-03-05 NOTE — TELEPHONE ENCOUNTER
Refill Routing Note   Medication(s) are not appropriate for processing by [facility] for the following reason(s):        No active prescription written by provider    [facility] action(s):  Defer               Appointments  past 12m or future 3m with PCP    Date Provider   Last Visit   3/4/2024 Jazzy Gallagher MD   Next Visit   Visit date not found Jazzy Gallagher MD   ED visits in past 90 days: 0        Note composed:7:03 PM 03/04/2024

## 2024-04-24 DIAGNOSIS — Z12.31 ENCOUNTER FOR SCREENING MAMMOGRAM FOR MALIGNANT NEOPLASM OF BREAST: Primary | ICD-10-CM

## 2024-05-07 ENCOUNTER — HOSPITAL ENCOUNTER (OUTPATIENT)
Dept: RADIOLOGY | Facility: HOSPITAL | Age: 74
Discharge: HOME | End: 2024-05-07
Attending: OBSTETRICS & GYNECOLOGY
Payer: MEDICARE

## 2024-05-07 VITALS — WEIGHT: 255.06 LBS | BODY MASS INDEX: 42.49 KG/M2 | HEIGHT: 65 IN

## 2024-05-07 DIAGNOSIS — Z12.31 ENCOUNTER FOR SCREENING MAMMOGRAM FOR MALIGNANT NEOPLASM OF BREAST: ICD-10-CM

## 2024-05-07 PROCEDURE — 77063 BREAST TOMOSYNTHESIS BI: CPT | Mod: 26,,, | Performed by: RADIOLOGY

## 2024-05-07 PROCEDURE — 77067 SCR MAMMO BI INCL CAD: CPT | Mod: 26,,, | Performed by: RADIOLOGY

## 2024-05-07 PROCEDURE — 77067 SCR MAMMO BI INCL CAD: CPT | Mod: TC,PO

## 2024-05-24 ENCOUNTER — OFFICE VISIT (OUTPATIENT)
Dept: URGENT CARE | Facility: CLINIC | Age: 74
End: 2024-05-24
Payer: MEDICARE

## 2024-05-24 ENCOUNTER — NURSE TRIAGE (OUTPATIENT)
Dept: ADMINISTRATIVE | Facility: CLINIC | Age: 74
End: 2024-05-24
Payer: MEDICARE

## 2024-05-24 ENCOUNTER — HOSPITAL ENCOUNTER (EMERGENCY)
Facility: HOSPITAL | Age: 74
Discharge: HOME | End: 2024-05-24
Attending: EMERGENCY MEDICINE
Payer: MEDICARE

## 2024-05-24 VITALS
BODY MASS INDEX: 41.65 KG/M2 | SYSTOLIC BLOOD PRESSURE: 161 MMHG | TEMPERATURE: 98 F | WEIGHT: 250 LBS | HEIGHT: 65 IN | DIASTOLIC BLOOD PRESSURE: 82 MMHG | OXYGEN SATURATION: 97 % | RESPIRATION RATE: 17 BRPM | HEART RATE: 62 BPM

## 2024-05-24 VITALS
OXYGEN SATURATION: 98 % | BODY MASS INDEX: 41.69 KG/M2 | HEIGHT: 65 IN | HEART RATE: 66 BPM | WEIGHT: 250.25 LBS | SYSTOLIC BLOOD PRESSURE: 136 MMHG | RESPIRATION RATE: 16 BRPM | TEMPERATURE: 97 F | DIASTOLIC BLOOD PRESSURE: 74 MMHG

## 2024-05-24 DIAGNOSIS — R11.2 NAUSEA AND VOMITING, UNSPECIFIED VOMITING TYPE: ICD-10-CM

## 2024-05-24 DIAGNOSIS — R42 DIZZINESS: Primary | ICD-10-CM

## 2024-05-24 DIAGNOSIS — R73.9 HYPERGLYCEMIA WITHOUT KETOSIS: ICD-10-CM

## 2024-05-24 DIAGNOSIS — R73.9 HYPERGLYCEMIA: ICD-10-CM

## 2024-05-24 LAB
ALBUMIN SERPL BCP-MCNC: 3.4 G/DL (ref 3.5–5.2)
ALLENS TEST: ABNORMAL
ALP SERPL-CCNC: 194 U/L (ref 55–135)
ALT SERPL W/O P-5'-P-CCNC: 88 U/L (ref 10–44)
ANION GAP SERPL CALC-SCNC: 11 MMOL/L (ref 8–16)
AST SERPL-CCNC: 55 U/L (ref 10–40)
BASOPHILS # BLD AUTO: 0.07 K/UL (ref 0–0.2)
BASOPHILS NFR BLD: 0.7 % (ref 0–1.9)
BILIRUB SERPL-MCNC: 1.3 MG/DL (ref 0.1–1)
BUN SERPL-MCNC: 20 MG/DL (ref 8–23)
CALCIUM SERPL-MCNC: 9.4 MG/DL (ref 8.7–10.5)
CHLORIDE SERPL-SCNC: 102 MMOL/L (ref 95–110)
CO2 SERPL-SCNC: 25 MMOL/L (ref 23–29)
CREAT SERPL-MCNC: 0.9 MG/DL (ref 0.5–1.4)
DELSYS: ABNORMAL
DIFFERENTIAL METHOD BLD: ABNORMAL
EOSINOPHIL # BLD AUTO: 0.1 K/UL (ref 0–0.5)
EOSINOPHIL NFR BLD: 1 % (ref 0–8)
ERYTHROCYTE [DISTWIDTH] IN BLOOD BY AUTOMATED COUNT: 14 % (ref 11.5–14.5)
EST. GFR  (NO RACE VARIABLE): >60 ML/MIN/1.73 M^2
FIO2: 21
GLUCOSE SERPL-MCNC: 270 MG/DL (ref 70–110)
GLUCOSE SERPL-MCNC: 294 MG/DL (ref 70–110)
HCO3 UR-SCNC: 29.6 MMOL/L (ref 24–28)
HCT VFR BLD AUTO: 39.2 % (ref 37–48.5)
HCV AB SERPL QL IA: NORMAL
HGB BLD-MCNC: 13.4 G/DL (ref 12–16)
HIV 1+2 AB+HIV1 P24 AG SERPL QL IA: NORMAL
IMM GRANULOCYTES # BLD AUTO: 0.05 K/UL (ref 0–0.04)
IMM GRANULOCYTES NFR BLD AUTO: 0.5 % (ref 0–0.5)
LYMPHOCYTES # BLD AUTO: 1.4 K/UL (ref 1–4.8)
LYMPHOCYTES NFR BLD: 14.7 % (ref 18–48)
MCH RBC QN AUTO: 30.8 PG (ref 27–31)
MCHC RBC AUTO-ENTMCNC: 34.2 G/DL (ref 32–36)
MCV RBC AUTO: 90 FL (ref 82–98)
MODE: ABNORMAL
MONOCYTES # BLD AUTO: 0.5 K/UL (ref 0.3–1)
MONOCYTES NFR BLD: 4.8 % (ref 4–15)
NEUTROPHILS # BLD AUTO: 7.4 K/UL (ref 1.8–7.7)
NEUTROPHILS NFR BLD: 78.3 % (ref 38–73)
NRBC BLD-RTO: 0 /100 WBC
OHS QRS DURATION: 74 MS
OHS QTC CALCULATION: 428 MS
PCO2 BLDA: 43.2 MMHG (ref 35–45)
PH SMN: 7.44 [PH] (ref 7.35–7.45)
PLATELET # BLD AUTO: 200 K/UL (ref 150–450)
PMV BLD AUTO: 10 FL (ref 9.2–12.9)
PO2 BLDA: 36 MMHG (ref 40–60)
POC BE: 6 MMOL/L
POC SATURATED O2: 71 % (ref 95–100)
POCT GLUCOSE: 250 MG/DL (ref 70–110)
POTASSIUM SERPL-SCNC: 4.1 MMOL/L (ref 3.5–5.1)
PROT SERPL-MCNC: 7.4 G/DL (ref 6–8.4)
RBC # BLD AUTO: 4.35 M/UL (ref 4–5.4)
SAMPLE: ABNORMAL
SITE: ABNORMAL
SODIUM SERPL-SCNC: 138 MMOL/L (ref 136–145)
TROPONIN I SERPL DL<=0.01 NG/ML-MCNC: <0.006 NG/ML (ref 0–0.03)
WBC # BLD AUTO: 9.41 K/UL (ref 3.9–12.7)

## 2024-05-24 PROCEDURE — 99213 OFFICE O/P EST LOW 20 MIN: CPT | Mod: S$GLB,,, | Performed by: NURSE PRACTITIONER

## 2024-05-24 PROCEDURE — 93010 ELECTROCARDIOGRAM REPORT: CPT | Mod: ,,, | Performed by: INTERNAL MEDICINE

## 2024-05-24 PROCEDURE — 96360 HYDRATION IV INFUSION INIT: CPT | Mod: 59

## 2024-05-24 PROCEDURE — 76705 ECHO EXAM OF ABDOMEN: CPT | Mod: 26,,, | Performed by: RADIOLOGY

## 2024-05-24 PROCEDURE — 70496 CT ANGIOGRAPHY HEAD: CPT | Mod: 26,,, | Performed by: RADIOLOGY

## 2024-05-24 PROCEDURE — 87389 HIV-1 AG W/HIV-1&-2 AB AG IA: CPT | Performed by: EMERGENCY MEDICINE

## 2024-05-24 PROCEDURE — 70551 MRI BRAIN STEM W/O DYE: CPT | Mod: TC

## 2024-05-24 PROCEDURE — 80053 COMPREHEN METABOLIC PANEL: CPT | Performed by: EMERGENCY MEDICINE

## 2024-05-24 PROCEDURE — 86803 HEPATITIS C AB TEST: CPT | Performed by: EMERGENCY MEDICINE

## 2024-05-24 PROCEDURE — 25000003 PHARM REV CODE 250: Performed by: EMERGENCY MEDICINE

## 2024-05-24 PROCEDURE — 70551 MRI BRAIN STEM W/O DYE: CPT | Mod: 26,,, | Performed by: RADIOLOGY

## 2024-05-24 PROCEDURE — 76705 ECHO EXAM OF ABDOMEN: CPT | Mod: TC

## 2024-05-24 PROCEDURE — 93005 ELECTROCARDIOGRAM TRACING: CPT

## 2024-05-24 PROCEDURE — 82803 BLOOD GASES ANY COMBINATION: CPT

## 2024-05-24 PROCEDURE — 25500020 PHARM REV CODE 255: Performed by: EMERGENCY MEDICINE

## 2024-05-24 PROCEDURE — 84484 ASSAY OF TROPONIN QUANT: CPT | Performed by: EMERGENCY MEDICINE

## 2024-05-24 PROCEDURE — 99900035 HC TECH TIME PER 15 MIN (STAT)

## 2024-05-24 PROCEDURE — 82962 GLUCOSE BLOOD TEST: CPT | Mod: ,,, | Performed by: NURSE PRACTITIONER

## 2024-05-24 PROCEDURE — 70498 CT ANGIOGRAPHY NECK: CPT | Mod: 26,,, | Performed by: RADIOLOGY

## 2024-05-24 PROCEDURE — 85025 COMPLETE CBC W/AUTO DIFF WBC: CPT | Performed by: EMERGENCY MEDICINE

## 2024-05-24 PROCEDURE — 70496 CT ANGIOGRAPHY HEAD: CPT | Mod: TC

## 2024-05-24 PROCEDURE — 82962 GLUCOSE BLOOD TEST: CPT

## 2024-05-24 PROCEDURE — 94760 N-INVAS EAR/PLS OXIMETRY 1: CPT

## 2024-05-24 PROCEDURE — 99285 EMERGENCY DEPT VISIT HI MDM: CPT | Mod: 25

## 2024-05-24 RX ORDER — CLOPIDOGREL BISULFATE 75 MG/1
300 TABLET ORAL
Status: COMPLETED | OUTPATIENT
Start: 2024-05-24 | End: 2024-05-24

## 2024-05-24 RX ORDER — ASPIRIN 325 MG
325 TABLET ORAL
Status: COMPLETED | OUTPATIENT
Start: 2024-05-24 | End: 2024-05-24

## 2024-05-24 RX ADMIN — ASPIRIN 325 MG ORAL TABLET 325 MG: 325 PILL ORAL at 08:05

## 2024-05-24 RX ADMIN — IOHEXOL 100 ML: 350 INJECTION, SOLUTION INTRAVENOUS at 06:05

## 2024-05-24 RX ADMIN — CLOPIDOGREL BISULFATE 300 MG: 75 TABLET ORAL at 08:05

## 2024-05-24 NOTE — TELEPHONE ENCOUNTER
"Called pt to check status. Pt states she's "better than this morning, but not good." States she went to [facility] in [place]. Pt was advised to go to ER and get a CT scan and her glucose reading was "real high". Per office note glucose reading was 294. Pt states [place] and she has to find someone to take her to the ER as she is afraid to drive that far. Pt states if she's able to go to the ER she will go to [facility] in [place]. Pt aware PCP is [facility] until Tuesday and message will be sent to Dr. Urbina for review. Advised pt to contact office with any needs/concerns, even over the weekend.  "

## 2024-05-24 NOTE — PROGRESS NOTES
"Subjective:      Patient ID: Yaneth Vera is a 73 y.o. female.    Vitals:  height is 5' 5" (1.651 m) and weight is 113.5 kg (250 lb 3.6 oz). Her oral temperature is 97.4 °F (36.3 °C). Her blood pressure is 136/74 and her pulse is 66. Her respiration is 16 and oxygen saturation is 98%.     Chief Complaint: Emesis    This is a 73 y.o. female who presents today with a chief complaint of: Patient reports she woke up with dizziness and loss of balance in the middle of the night last night. Patient stated she tried to eat crackers and drink, and she vomited. She has vomited three times. Patient reports increase thirst the past few days. She increased her fluid intake and reports increased urine output. She reports that she started using a cane this morning that she had in her garage to help her with ambulating. Capillary Blood Glucose here is 294mg/dL.    Emesis   This is a new problem. The current episode started today. The problem occurs 2 to 4 times per day. The problem has been waxing and waning. The emesis has an appearance of stomach contents. There has been no fever. Associated symptoms include dizziness. Pertinent negatives include no fever. Treatments tried: Aleve.       Constitution: Negative for fever.   Gastrointestinal:  Positive for nausea and vomiting.   Skin:  Negative for erythema.   Neurological:  Positive for dizziness and loss of balance.      Objective:     Physical Exam   Constitutional: She is oriented to person, place, and time.  Non-toxic appearance. She does not appear ill. No distress. obesity  HENT:   Head: Normocephalic and atraumatic.   Ears:   Right Ear: Tympanic membrane, external ear and ear canal normal.   Left Ear: Tympanic membrane, external ear and ear canal normal.   Nose: Nose normal.   Mouth/Throat: Mucous membranes are moist. No posterior oropharyngeal erythema. Oropharynx is clear.   Eyes: Conjunctivae are normal. Pupils are equal, round, and reactive to light. Extraocular " movement intact   Neck: Neck supple. No neck rigidity present.   Cardiovascular: Normal rate, regular rhythm, normal heart sounds and normal pulses.   Pulmonary/Chest: Effort normal and breath sounds normal.   Abdominal: Normal appearance and bowel sounds are normal. She exhibits no distension. Soft. flat abdomen There is no abdominal tenderness.   Musculoskeletal: Normal range of motion.         General: Normal range of motion.      Cervical back: She exhibits no tenderness.   Lymphadenopathy:     She has no cervical adenopathy.   Neurological: no focal deficit. She is alert and oriented to person, place, and time. She displays no weakness. No cranial nerve deficit or sensory deficit. Coordination and gait normal.      Comments: +Romberg.   Skin: Skin is warm, dry, not diaphoretic and no rash. Capillary refill takes less than 2 seconds. No erythema   Psychiatric: Her behavior is normal. Mood normal.   Vitals reviewed.    Results for orders placed or performed in visit on 05/24/24   POCT Glucose, Hand-Held Device   Result Value Ref Range    POC Glucose 294 (A) 70 - 110 MG/DL       NOTE: I advised the patient that given her symptoms, blood glucose, and exam findings, she should be evaluated in the ER. I advised her to call someone to drive her there. Patient verbalized understanding.  Assessment:     1. Dizziness    2. Hyperglycemia    3. Nausea and vomiting, unspecified vomiting type        Plan:       Dizziness  -     POCT Glucose, Hand-Held Device    Hyperglycemia    Nausea and vomiting, unspecified vomiting type    INSTRUCTIONS  To ER now for further evaluation and treatment.

## 2024-05-24 NOTE — TELEPHONE ENCOUNTER
Chart reviewed.  I agree with UC provider for patient to be evaluated int he ER. (By ambulance if necessary.)

## 2024-05-24 NOTE — ED NOTES
Orthostatic vital signs:    HR= 65 and OZ=196/79.....flat     HR= 63 and BP= 174/70.....sitting     HR= 67 and BP= 177/74.....standing     EDP and primary RN notified

## 2024-05-24 NOTE — ED NOTES
Patient presents to ED POV with c/o hyperglycemia and dizziness. She reports that during the night she woke up to go to the restroom and noticed that she was dizzy. She reports that she has vomited three times throughout the day today. She went to urgent care and was advised to come here because her glucose was over 294. She denies chest pain or SOB. She is AAOx4. Skin warm, dry to touch. Respirations even, non labored. Ambulatory unassisted with steady gait into triage. She denies dizziness while sitting still. Reports that she becomes dizzy when she lifts her head up.

## 2024-05-24 NOTE — TELEPHONE ENCOUNTER
Pt called in from [place] stating that she had some dizziness during the night when she got up to go to the bathroom. Vomiting this morning as well. Vomiting x3 today. Had dizziness in the past- has meclizine at home but has not taken. States she vomited after trying to eat crackers, drink water, and take Aleve. Now just vomiting liquids. States she is able to ambulate but not very far. Care advice per protocol. Consult with ANDERS Argueta PA-C to see if pt candidate for PCP visit- advised pt to go to ED. Pt advised per  PHILIPPE- asking if she can go to clinic or UC as closer. Advised clinic does not have testing or treatment capabilities so she should not go there. States UC closer than ED. Advised she can try UC may be able to see her but not certain, ED encouraged. States she can get a ride. Advised if condition worsens or if she can not find a ride, call 911 for EMS assistance. Verbalized understanding.     Reason for Disposition   Drinking very little and dehydration suspected (e.g., no urine > 12 hours, very dry mouth, very lightheaded)    Additional Information   Negative: SEVERE difficulty breathing (e.g., struggling for each breath, speaks in single words)   Negative: Shock suspected (e.g., cold/pale/clammy skin, too weak to stand, low BP, rapid pulse)   Negative: Difficult to awaken or acting confused (e.g., disoriented, slurred speech)   Negative: Fainted, and still feels dizzy afterwards   Negative: Overdose (accidental or intentional) of medications   Negative: New neurologic deficit that is present now: * Weakness of the face, arm, or leg on one side of the body * Numbness of the face, arm, or leg on one side of the body * Loss of speech or garbled speech   Negative: Heart beating < 50 beats per minute OR > 140 beats per minute   Negative: Sounds like a life-threatening emergency to the triager   Negative: SEVERE dizziness (e.g., unable to stand, requires support to walk, feels like passing out now)   Negative:  SEVERE headache or neck pain   Negative: Spinning or tilting sensation (vertigo) present now and one or more stroke risk factors (i.e., hypertension, diabetes mellitus, prior stroke/TIA, heart attack, age over 60) (Exception: Prior physician evaluation for this AND no different/worse than usual.)   Negative: Neurologic deficit that was brief (now gone), ANY of the following:* Weakness of the face, arm, or leg on one side of the body* Numbness of the face, arm, or leg on one side of the body* Loss of speech or garbled speech   Negative: Loss of vision or double vision  (Exception: Similar to previous migraines.)   Negative: Extra heartbeats, irregular heart beating, or heart is beating very fast (i.e., 'palpitations')   Negative: Difficulty breathing    Protocols used: Dizziness-A-OH

## 2024-05-24 NOTE — ED NOTES
Pt brought to bathroom via wheel chair. Informed pt of bathroom call light if assistance is need. Pt states she is feeling a little better. Informed pt that we will ambulate back to room with assistance. Pt back in bed on contentious monitor. Pt stated she did feel nauseas when back in bed. Scant amount of yellow emesis noted in emesis bag.

## 2024-05-24 NOTE — ED PROVIDER NOTES
History     Chief Complaint   Patient presents with    Dizziness    Hyperglycemia     HPI:  Yaneth Vera is a 73 y.o. female with PMH as below who presents to the [facility] emergency department for evaluation of lightheadness, dizziness worse with standing and alleviated by rest, nausea, vomiting, chills, and malaise. She noted elevated glucose at home. She went to  and suggested she come to ED for further evaluation, particularly head CT.     PCP: Jazzy Gallagher MD    Review of patient's allergies indicates:   Allergen Reactions    Glimepiride     Metformin       Past Medical History:   Diagnosis Date    Basal cell carcinoma      Past Surgical History:   Procedure Laterality Date    EYE SURGERY Left     TUBAL LIGATION         Family History   Problem Relation Name Age of Onset    Cancer Maternal Aunt Sherie     Asthma Mother Ariana     Cancer Father Frank      Social History     Tobacco Use    Smoking status: Never     Passive exposure: Never    Smokeless tobacco: Never   Substance and Sexual Activity    Alcohol use: Yes     Alcohol/week: 1.0 standard drink of alcohol     Types: 1 Drinks containing 0.5 oz of alcohol per week     Comment: Occ    Drug use: No    Sexual activity: Not Currently     Partners: Male     Birth control/protection: None      Review of Systems     Review of Systems   Constitutional:  Positive for chills. Negative for fever.   HENT: Negative.     Eyes: Negative.    Respiratory: Negative.     Cardiovascular: Negative.  Negative for chest pain.   Gastrointestinal:  Positive for nausea and vomiting. Negative for abdominal pain.   Endocrine: Negative.    Genitourinary: Negative.    Musculoskeletal: Negative.    Skin: Negative.    Allergic/Immunologic: Negative.    Neurological: Negative.  Negative for weakness and numbness.   Hematological: Negative.    Psychiatric/Behavioral: Negative.     All other systems reviewed and are negative.       Physical Exam     Initial Vitals   BP Pulse  "Resp Temp SpO2   05/24/24 1514 05/24/24 1459 05/24/24 1459 05/24/24 1459 05/24/24 1459   (!) 198/79 61 18 97.7 °F (36.5 °C) 98 %      MAP       --                 Nursing notes and vital signs reviewed.  Constitutional: Patient is in no acute distress.   Head: Normocephalic. Atraumatic.   Eyes:  Conjunctivae are not pale. No scleral icterus.   ENT: Mucous membranes moist.   Neck: Supple.   Cardiovascular: Regular rate. Regular rhythm. No murmurs, rubs, or gallops   Pulmonary: No respiratory distress. Clear to auscultation bilaterally   Abdominal: Soft. Non-distended. Nontender.   Musculoskeletal: Moves all extremities. No obvious deformities.   Skin: Warm and dry.   Neurological:  Alert, awake, and appropriate. Normal speech. No acute lateralizing neurologic deficits appreciated.   Psychiatric: Normal affect.       ED Course   Procedures  Vitals:    05/24/24 1459 05/24/24 1514 05/24/24 1617 05/24/24 1747   BP:  (!) 198/79 (!) 181/77 (!) 173/74   Pulse: 61  62 60   Resp: 18  16 19   Temp: 97.7 °F (36.5 °C)      TempSrc: Oral      SpO2: 98%  99% 97%   Weight: 113.4 kg (250 lb)      Height: 5' 5" (1.651 m)       05/24/24 1802 05/24/24 1859 05/24/24 1919 05/24/24 2057   BP: (!) 163/70 (!) 167/70 (!) 168/73 (!) 183/77   Pulse: (!) 57 (!) 51 (!) 56 (!) 59   Resp: 20 13 16 19   Temp:       TempSrc:       SpO2: 99% 95% 96% 96%   Weight:       Height:        05/24/24 2322   BP: (!) 161/82   Pulse: 62   Resp: 17   Temp: 98 °F (36.7 °C)   TempSrc: Oral   SpO2: 97%   Weight:    Height:      Lab Results Interpreted as Abnormal:  Labs Reviewed   CBC W/ AUTO DIFFERENTIAL - Abnormal; Notable for the following components:       Result Value    Immature Grans (Abs) 0.05 (*)     Gran % 78.3 (*)     Lymph % 14.7 (*)     All other components within normal limits   COMPREHENSIVE METABOLIC PANEL - Abnormal; Notable for the following components:    Glucose 270 (*)     Albumin 3.4 (*)     Total Bilirubin 1.3 (*)     Alkaline Phosphatase 194 " (*)     AST 55 (*)     ALT 88 (*)     All other components within normal limits   POCT GLUCOSE - Abnormal; Notable for the following components:    POCT Glucose 250 (*)     All other components within normal limits   ISTAT PROCEDURE - Abnormal; Notable for the following components:    POC PO2 36 (*)     POC HCO3 29.6 (*)     POC BE 6 (*)     All other components within normal limits   HIV 1 / 2 ANTIBODY    Narrative:     Release to patient->Immediate   HEPATITIS C ANTIBODY    Narrative:     Release to patient->Immediate   TROPONIN I   POCT GLUCOSE MONITORING CONTINUOUS      All Lab Results:  Results for orders placed or performed during the hospital encounter of 05/24/24   HIV 1/2 Ag/Ab (4th Gen)   Result Value Ref Range    HIV 1/2 Ag/Ab Non-reactive Non-reactive   Hepatitis C Antibody   Result Value Ref Range    Hepatitis C Ab Non-reactive Non-reactive   CBC auto differential   Result Value Ref Range    WBC 9.41 3.90 - 12.70 K/uL    RBC 4.35 4.00 - 5.40 M/uL    Hemoglobin 13.4 12.0 - 16.0 g/dL    Hematocrit 39.2 37.0 - 48.5 %    MCV 90 82 - 98 fL    MCH 30.8 27.0 - 31.0 pg    MCHC 34.2 32.0 - 36.0 g/dL    RDW 14.0 11.5 - 14.5 %    Platelets 200 150 - 450 K/uL    MPV 10.0 9.2 - 12.9 fL    Immature Granulocytes 0.5 0.0 - 0.5 %    Gran # (ANC) 7.4 1.8 - 7.7 K/uL    Immature Grans (Abs) 0.05 (H) 0.00 - 0.04 K/uL    Lymph # 1.4 1.0 - 4.8 K/uL    Mono # 0.5 0.3 - 1.0 K/uL    Eos # 0.1 0.0 - 0.5 K/uL    Baso # 0.07 0.00 - 0.20 K/uL    nRBC 0 0 /100 WBC    Gran % 78.3 (H) 38.0 - 73.0 %    Lymph % 14.7 (L) 18.0 - 48.0 %    Mono % 4.8 4.0 - 15.0 %    Eosinophil % 1.0 0.0 - 8.0 %    Basophil % 0.7 0.0 - 1.9 %    Differential Method Automated    Comprehensive metabolic panel   Result Value Ref Range    Sodium 138 136 - 145 mmol/L    Potassium 4.1 3.5 - 5.1 mmol/L    Chloride 102 95 - 110 mmol/L    CO2 25 23 - 29 mmol/L    Glucose 270 (H) 70 - 110 mg/dL    BUN 20 8 - 23 mg/dL    Creatinine 0.9 0.5 - 1.4 mg/dL    Calcium 9.4 8.7  - 10.5 mg/dL    Total Protein 7.4 6.0 - 8.4 g/dL    Albumin 3.4 (L) 3.5 - 5.2 g/dL    Total Bilirubin 1.3 (H) 0.1 - 1.0 mg/dL    Alkaline Phosphatase 194 (H) 55 - 135 U/L    AST 55 (H) 10 - 40 U/L    ALT 88 (H) 10 - 44 U/L    eGFR >60.0 >60 mL/min/1.73 m^2    Anion Gap 11 8 - 16 mmol/L   Troponin I   Result Value Ref Range    Troponin I <0.006 0.000 - 0.026 ng/mL   EKG 12-lead   Result Value Ref Range    QRS Duration 74 ms    [facility] QTC Calculation 428 ms   POCT glucose   Result Value Ref Range    POCT Glucose 250 (H) 70 - 110 mg/dL   ISTAT PROCEDURE   Result Value Ref Range    POC PH 7.444 7.35 - 7.45    POC PCO2 43.2 35 - 45 mmHg    POC PO2 36 (LL) 40 - 60 mmHg    POC HCO3 29.6 (H) 24 - 28 mmol/L    POC BE 6 (H) -2 to 2 mmol/L    POC SATURATED O2 71 95 - 100 %    Sample VENOUS     Site Other     Allens Test N/A     DelSys Room Air     Mode SPONT     FiO2 21      Imaging Results              MRI Brain Without Contrast (Final result)  Result time 05/24/24 22:47:45      Final result by Kyaw Solo MD (05/24/24 22:47:45)                   Impression:      No acute intracranial process.  Specifically, no MR evidence of acute infarction.    Age-related changes.      Electronically signed by: Kyaw Solo MD  Date:    05/24/2024  Time:    22:47               Narrative:    EXAMINATION:  MRI BRAIN WITHOUT CONTRAST    CLINICAL HISTORY:  Dizziness, non-specific;    TECHNIQUE:  Multiplanar multisequence MR imaging of the brain was performed without contrast.    COMPARISON:  CT/CTA 05/24/2024.    FINDINGS:  The craniocervical junction is intact.  The sellar and parasellar structures are within normal limits.  The intracranial flow voids are within normal limits.    No acute diffusion-weighted signal abnormality is present.  The ventricles and sulci are within normal limits.  There are scattered T2/FLAIR signal hyperintensities within the periventricular and subcortical white matter.  There are no extra-axial fluid collections.   There is no evidence of intracranial hemorrhage.    There are postoperative changes in the globes.  The paranasal sinuses are unremarkable.  The mastoids air cells are clear.  The calvarium is intact.                                       CTA Head and Neck (xpd) (Final result)  Result time 05/24/24 19:08:11      Final result by Leonie Munoz MD (05/24/24 19:08:11)                   Impression:      CTA head and neck: No large vessel occlusion.    Noncontrast CT brain: Small focal hypodensity within the left side of the prudence may represent artifact or acute infarct.    COMMUNICATION  The critical information above was relayed directly by Leonie Munoz by telephone to MD Toño on 5/24/2024 at 19:05.      Electronically signed by: Leonie Munoz  Date:    05/24/2024  Time:    19:08               Narrative:    EXAMINATION:  CTA HEAD AND NECK (XPD)    CLINICAL HISTORY:  Neuro deficit, acute, stroke suspected;    TECHNIQUE:  Non contrast low dose axial images were obtained through the head.  Coronal and sagittal reformatted images were obtained.    CT angiogram was performed from the level of the christos to the top of the head following the IV administration of 100mL of Omnipaque 350.   Sagittal and coronal reconstructions and maximum intensity projection reconstructions were performed. Arterial stenosis percentages are based on NASCET measurement criteria.    COMPARISON:  None    FINDINGS:  Intracranial Compartment:    Ventricles and sulci are normal in size for age without evidence of hydrocephalus. No extra-axial blood or fluid collections.    Mild periventricular and deep white matter changes of chronic microvascular ischemia.  Small focal hypodensity within the left side of the prudence may represent artifact or acute infarct.    Skull/Extracranial Contents (limited evaluation): No fracture. Mastoid air cells and paranasal sinuses are essentially clear.    Non-Vascular Structures of the Neck/Thoracic  Inlet (limited evaluation): Normal.    Aorta: Normal 3 vessel arch.    Extracranial carotid circulation: No hemodynamically significant stenosis, aneurysmal dilatation, or dissection.    Extracranial vertebral circulation: No hemodynamically significant stenosis, aneurysmal dilatation, or dissection.    Intracranial Arteries: No focal high-grade stenosis, occlusion, or aneurysm.    Venous structures (limited evaluation): Normal.                                       US Abdomen Limited_Gallbladder (Final result)  Result time 05/24/24 18:39:25      Final result by Leonie Munoz MD (05/24/24 18:39:25)                   Impression:      No acute findings.      Electronically signed by: Leonie Munoz  Date:    05/24/2024  Time:    18:39               Narrative:    EXAMINATION:  US ABDOMEN LIMITED_GALLBLADDER    CLINICAL HISTORY:  acute biliary obstrucive labs with N/V;    TECHNIQUE:  Limited ultrasound of the right upper quadrant of the abdomen focused on the biliary system was performed.    COMPARISON:  None    FINDINGS:  Gallbladder: No calculi, wall thickening, or pericholecystic fluid.  No sonographic Galeas's sign.    Biliary system: The common duct is not dilated, measuring 4 mm.  No intrahepatic ductal dilatation.    Pancreas: Not well seen.    Miscellaneous: Enlarged and fatty liver measuring up to 19 cm.                                     ED Physician's independent review of the above imaging: agree with radiologist, CT brain personally reviewed by me showed no evidence of intracranial hemorrhage, no large vessel occlusions.  There was an area in the prudence which per Radiology represents either a CVA versus artifact.  MRI recommended.  MRI, personally reviewed by me, shows no evidence of intracranial hemorrhage, no mass, no mass effect, no evidence of CVA.. .    The EKG was ordered, reviewed, and independently interpreted by the ED Physician:  Rhythm: sinus bradycardia   Rate: 56 bpm  Nonspecific  ST-T changes. No STEMI.   Normal axis   Normal intervals     The emergency physician reviewed the vital signs and test results, which are outlined above.     ED Discussion      Dr. Lundberg:  Patient care was assumed at shift change.  At that time, some labs, and CT of the brain were pending.  Labs showed an elevated glucose of about 260, without evidence of DKA.  CT of the brain showed a possible area of stroke in the prudence.  Initially it was thought that the patient would need to be transferred for MRI, but CT tech tonight is also an MRI tech and volunteer to do an MRI here.  This MRI showed no evidence of CVA.  No mass, etc..  Patient states that she has been told that she is prediabetic.  She was tried on metformin and 1 other oral medication, and she had allergic reactions or intolerable side effects from each medication.  I believe patient is safe for discharge at this time.  I have recommended that she start following a strict diabetic diet, and follow-up with her PCP about her elevated blood glucose.  I have also recommended that she follow-up with ENT about her dizziness.  She tells me that she is very susceptible to see sickness and has meclizine at home.  She also had cataract surgery in the past which did not go well and as result she had some retinal damage.  The resulting visual changes cause occasional sensations of dizziness for the patient tonight symptoms seemed to be somewhat different however.  There is no evidence of otitis media or otitis externa, and I believe follow-up with ENT is warranted.  Patient will return here as needed or if worse in any way.      ED Medication(s) Administered:  Medications   lactated ringers bolus 1,000 mL (0 mLs Intravenous Stopped 5/24/24 1728)   iohexoL (OMNIPAQUE 350) injection 100 mL (100 mLs Intravenous Given 5/24/24 1830)   aspirin tablet 325 mg (325 mg Oral Given 5/24/24 2055)   clopidogreL tablet 300 mg (300 mg Oral Given 5/24/24 2055)       Prescription  Management: I performed a review of the patient's current Rx medication list as input by nursing staff.    Patient's Medications   New Prescriptions    No medications on file   Previous Medications    CALCIUM CARBONATE (CALCIUM 300 ORAL)    calcium    DORZOLAMIDE-TIMOLOL 2-0.5% (COSOPT) 22.3-6.8 MG/ML OPHTHALMIC SOLUTION    1 drop 2 (two) times daily.    ERGOCALCIFEROL, VITAMIN D2, (VITAMIN D ORAL)    Vitamin D    GLUCOSAMINE SULFATE (SYNOVACIN ORAL)    Glucosamine    OMEGA 3-DHA-EPA-FISH -160-1,000 MG CAP    Fish Oil    OMEPRAZOLE (PRILOSEC) 20 MG CAPSULE    Take 1 capsule (20 mg total) by mouth once daily.    TRIAMCINOLONE ACETONIDE 0.1% (KENALOG) 0.1 % CREAM    APPLY ON THE SKIN TWICE DAILY   Modified Medications    No medications on file   Discontinued Medications    No medications on file         Follow-up Information       Jazzy Gallagher MD In 4 days.    Specialty: Family Medicine  Contact information:  [address] [facility] [place] [address]  [phone]               Schedule an appointment as soon as possible for a visit  with Bernardo Gunn MD.    Specialty: Otolaryngology  Contact information:  [address] [facility] [place] [address]  [phone]               your ENT.               [facility] Emergency Dept.    Specialty: Emergency Medicine  Why: As needed, If symptoms worsen  Contact information:  [address] [facility] [address] [phone]                          Clinical Impression       ICD-10-CM ICD-9-CM   1. Dizziness  R42 780.4   2. Hyperglycemia without ketosis  R73.9 790.29      ED Disposition Condition    Transfer to Another Facility Jc Hall MD  05/24/24 7553

## 2024-05-25 NOTE — DISCHARGE INSTRUCTIONS
Your MRI shows no evidence of stroke.  Your symptoms suggest vertigo.  This could be caused by many things, most of which are under the umbrella of the expertise of ear nose and throat doctors.  Follow-up with your 's ENT, or with Dr. Gunn, and also follow-up with Dr. Gallagher.  In the meantime, start following a diabetic diet.  There are numerous websites for diets etc. Take your previously prescribed meclizine to see if this helps with your dizziness.  Return here for any worsening signs or symptoms

## 2024-05-25 NOTE — ED NOTES
D/c instructions provided to patient. Encouraged to follow up with ENT for continued vertigo. Verbalized can use meclizine if needed for dizziness and to return for new or worsening symptoms. Follow up explained. Verbalized understanding of treatment plan. Patient able to walk without assistance to lobby. Denies any dizziness upon standing. Gait wnl.

## 2024-05-25 NOTE — ED NOTES
Remains without neurologic deficits. AAO x 4. BRANDEN. Denies any symptoms upon lying down. Vitally stable with slight hypertension.

## 2024-05-25 NOTE — ED NOTES
Remains without neurologic deficits. Denies any dizziness or c/o at this time. Vitally stable. Preparing patient for discharge.

## 2024-05-31 NOTE — TELEPHONE ENCOUNTER
"Spoke to pt. States she is currently in [place]. Pt saw ENT yesterday and is now taking prednisone. States she's feeling "ok". Pt will not be back in [place] for another month. Pt aware to call when she returns with any needs/concerns.  "

## 2024-07-10 ENCOUNTER — CLINICAL SUPPORT (OUTPATIENT)
Dept: REHABILITATION | Facility: HOSPITAL | Age: 74
End: 2024-07-10
Payer: MEDICARE

## 2024-07-10 DIAGNOSIS — M53.3 SI (SACROILIAC) JOINT DYSFUNCTION: ICD-10-CM

## 2024-07-10 DIAGNOSIS — Z74.09 IMPAIRED FUNCTIONAL MOBILITY, BALANCE, GAIT, AND ENDURANCE: Primary | ICD-10-CM
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
## 2024-07-10 PROCEDURE — 97110 THERAPEUTIC EXERCISES: CPT | Mod: PN

## 2024-07-10 PROCEDURE — 97161 PT EVAL LOW COMPLEX 20 MIN: CPT | Mod: PN

## 2024-07-10 NOTE — PLAN OF CARE
[facility]   Physical Therapy Initial Evaluation      Name: Yaneth Vera  Clinic Number: [id number]    Therapy Diagnosis:   Encounter Diagnoses   Name Primary?    SI (sacroiliac) joint dysfunction     Injury caused by bending, sequela     Impaired functional mobility, balance, gait, and endurance Yes        Physician: Jazzy Gallagher MD    Physician Orders: PT Eval and Treat   Medical Diagnosis from Referral: M53.3 (ICD-10-CM) - SI (sacroiliac) joint dysfunction X50.1XXS (ICD-10-CM) - Injury caused by bending, sequela  Evaluation Date: 7/10/2024  Authorization Period Expiration: 12/31/24  Plan of Care Expiration: 10/10/24  Progress Note Due: 8/10/24  Date of Surgery: none at this time  Visit # / Visits authorized: 1/ 1   FOTO: 1/ 3    Precautions: Standard and Fall     Time In: 115  Time Out: 200  Total Billable Time: 45 minutes    Subjective     Date of onset: 5 years of LBP    History of current condition - Yaneth reports: having left hip and waist pain that never goes away. X rays have been taken that are unremarkable but has never had an MRI.     Falls: not recently     Imaging: x ray see chart for complete list    Prior Therapy: none  Social History:  lives with their spouse  Occupation: retired  Prior Level of Function: I  Current Level of Function: I with caution with gait     Pain:  Current 5/10, worst 8/10, best 5/10   Location: left hip and back  Description: Aching, Dull, and Throbbing  Aggravating Factors: Standing, Bending, and Walking  Easing Factors: nothing    Patients goals: ' to be more stable and have relief of low back and hip pain. '      Medical History:   Past Medical History:   Diagnosis Date    Basal cell carcinoma        Surgical History:   Yaneth Vera  has a past surgical history that includes Tubal ligation and Eye surgery (Left).    Medications:   Yaneth has a current medication list which includes the following prescription(s): calcium carbonate,  dorzolamide-timolol 2-0.5%, ergocalciferol (vitamin d2), glucosamine sulfate, omega 3-dha-epa-fish oil, omeprazole, and triamcinolone acetonide 0.1%.    Allergies:   Review of patient's allergies indicates:   Allergen Reactions    Glimepiride     Metformin         Objective      Observation: alert and orient x 4     Lower Extremity Strength   Right LE Left LE   Knee extension: 4-/5 4/5   Knee flexion: 4/5 4/5   Hip flexion: 4-/5 4-/5   Hip extension:  3+/5 3+/5   Hip abduction: 3+/5 3+/5   Hip adduction: 4-/5 4-/5   Ankle dorsiflexion: 4+/5 4+/5   Ankle plantarflexion: 4+/5 4+/5     Palpation: minimal tenderness to palpation at at hip region on B sides more severe on left     Sensation: normal at this time.     Flexibility:     90/90 SLR = R minimal restriction, L minimal restriction    PT Evaluation Completed? Yes  Discussed Plan of Care with patient: Yes    Intake Outcome Measure for FOTO  Survey    Therapist reviewed FOTO scores for Yaneth Vera on 7/10/2024.   FOTO report - see Media section or FOTO account episode details.    Intake Score:          Treatment     Total Treatment time (time-based codes) separate from Evaluation: 30 minutes     Yaneth received the treatments listed below:      therapeutic exercises to develop strength and endurance for 10 minutes including:  Hip 3 way  SLR 20x  Mini squats 20x    Patient Education and Home Exercises     Education provided:   - HEP, POC    Written Home Exercises Provided: Patient instructed to cont prior HEP. Exercises were reviewed and Yaneth was able to demonstrate them prior to the end of the session.  Yaneth demonstrated good  understanding of the education provided. See EMR under Patient Instructions for exercises provided during therapy sessions.    Assessment     Yaneth is a 74 y.o. female referred to outpatient Physical Therapy with a medical diagnosis of M53.3 (ICD-10-CM) - SI (sacroiliac) joint dysfunction . Patient presents with antalgic gait with waddling  gait. Pt is leaving to go on vacation for a month and is in need of strengthening and stretching exercises at this time. Pt has been in severe pain for a while now. Pt has weakness and decreased balance at this time. Pt is in need of strengthening and neuro reeducation at this time to prevent further decline in functional status.     Patient prognosis is Good.   Patient will benefit from skilled outpatient Physical Therapy to address the deficits stated above and in the chart below, provide patient /family education, and to maximize patientt's level of independence.     Plan of care discussed with patient: Yes  Patient's spiritual, cultural and educational needs considered and patient is agreeable to the plan of care and goals as stated below:     Anticipated Barriers for therapy: na    Medical Necessity is demonstrated by the following  History  Co-morbidities and personal factors that may impact the plan of care [x] LOW: no personal factors / co-morbidities  [] MODERATE: 1-2 personal factors / co-morbidities  [] HIGH: 3+ personal factors / co-morbidities    Moderate / High Support Documentation:   Co-morbidities affecting plan of care: na    Personal Factors:   no deficits     Examination  Body Structures and Functions, activity limitations and participation restrictions that may impact the plan of care [x] LOW: addressing 1-2 elements  [] MODERATE: 3+ elements  [] HIGH: 4+ elements (please support below)    Moderate / High Support Documentation: na     Clinical Presentation [x] LOW: stable  [] MODERATE: Evolving  [] HIGH: Unstable     Decision Making/ Complexity Score: low         Goals:  Short Term Goals: 3 weeks   Pt will be compliant with HEP.  Pt will improve quad strength by 1/2 grade to allow for strength to go up and down stairs.   Pt will improve sit <>  30 sec to at least 10 allow for increased functional mobility.    Long Term Goals: 6 weeks   Pt will be independent with HEP to allow for  increased functional tasks.  Pt will improve FOTO by 10 % to demonstrate improvement in quality of life.  Pt will improve hip flexor strength by 1/2 grade to allow for normalized body mechanics.   Plan     Plan of care Certification: 7/10/2024 to 10/10/24.    Outpatient Physical Therapy 2 times weekly for 6 weeks to include the following interventions: Aquatic Therapy, Electrical Stimulation  , Gait Training, Manual Therapy, Moist Heat/ Ice, Neuromuscular Re-ed, Patient Education, Therapeutic Activities, Therapeutic Exercise, and Ultrasound.     Paradise Rios, PT        Physician's Signature: _________________________________________ Date: ________________

## 2024-07-11 ENCOUNTER — OFFICE VISIT (OUTPATIENT)
Dept: FAMILY MEDICINE | Facility: CLINIC | Age: 74
End: 2024-07-11
Payer: MEDICARE

## 2024-07-11 VITALS
HEART RATE: 56 BPM | OXYGEN SATURATION: 97 % | DIASTOLIC BLOOD PRESSURE: 86 MMHG | WEIGHT: 254.5 LBS | HEIGHT: 65 IN | BODY MASS INDEX: 42.4 KG/M2 | SYSTOLIC BLOOD PRESSURE: 134 MMHG

## 2024-07-11 DIAGNOSIS — R60.0 BILATERAL LOWER EXTREMITY EDEMA: Primary | ICD-10-CM

## 2024-07-11 PROCEDURE — 99213 OFFICE O/P EST LOW 20 MIN: CPT | Mod: PBBFAC,PN | Performed by: FAMILY MEDICINE

## 2024-07-11 PROCEDURE — 99214 OFFICE O/P EST MOD 30 MIN: CPT | Mod: S$PBB,,, | Performed by: FAMILY MEDICINE

## 2024-07-11 PROCEDURE — 99999 PR PBB SHADOW E&M-EST. PATIENT-LVL III: CPT | Mod: PBBFAC,,, | Performed by: FAMILY MEDICINE

## 2024-07-11 RX ORDER — POTASSIUM CHLORIDE 20 MEQ/1
20 TABLET, EXTENDED RELEASE ORAL DAILY
Qty: 5 TABLET | Refills: 0 | Status: SHIPPED | OUTPATIENT
Start: 2024-07-11 | End: 2024-07-16

## 2024-07-11 RX ORDER — FUROSEMIDE 20 MG/1
20 TABLET ORAL DAILY
Qty: 5 TABLET | Refills: 0 | Status: SHIPPED | OUTPATIENT
Start: 2024-07-11

## 2024-07-11 NOTE — PROGRESS NOTES
Subjective     Patient ID: Yaneth Vera is a 74 y.o. female.    Chief Complaint: Leg Swelling (L lower leg/ankle X1 week)    Swelling of lower extremities: Patient states about 3 weeks ago she did about a 12 hour car ride there and back.  Did not wear compression stockings.  States about a week ago she started to have swelling in both lower extremities.  States couple weeks prior she was on a cruise but did a lot of walking.  States she usually wears compression stockings when she flies because her legs swell, but did not wear them for the car ride.  Patient denies any pain in her calves, chest pain or shortness a breath.    Patient also states she was admitted for dizziness a couple months ago.  Workup was negative did outpatient follow up with ear nose and throat.  Symptoms have not returned since      Review of Systems   Constitutional:  Negative for activity change and unexpected weight change.   HENT:  Positive for rhinorrhea. Negative for hearing loss and trouble swallowing.    Eyes:  Negative for discharge and visual disturbance.   Respiratory:  Negative for chest tightness and wheezing.    Cardiovascular:  Negative for chest pain and palpitations.   Gastrointestinal:  Negative for blood in stool, constipation, diarrhea and vomiting.   Endocrine: Negative for polydipsia.   Genitourinary:  Negative for difficulty urinating, dysuria, hematuria and menstrual problem.   Musculoskeletal:  Positive for arthralgias, joint swelling and leg pain. Negative for neck pain.   Neurological:  Negative for weakness and headaches.   Psychiatric/Behavioral:  Negative for confusion and dysphoric mood.           Objective     Physical Exam  Vitals reviewed.   Constitutional:       General: She is not in acute distress.     Appearance: Normal appearance. She is not ill-appearing or toxic-appearing.   Cardiovascular:      Rate and Rhythm: Normal rate and regular rhythm.      Heart sounds: Normal heart sounds.   Pulmonary:       Effort: Pulmonary effort is normal.      Breath sounds: Normal breath sounds.   Abdominal:      General: Bowel sounds are normal.   Musculoskeletal:      Right lower leg: Edema (1+) present.      Left lower leg: Edema (1+) present.   Skin:     Comments: Mal appearance to bilateral lower extremity   Neurological:      General: No focal deficit present.      Mental Status: She is alert and oriented to person, place, and time.   Psychiatric:         Mood and Affect: Mood normal.         Behavior: Behavior normal.          Assessment and Plan     1. Bilateral lower extremity edema  -     US Lower Extremity Veins Bilateral; Future; Expected date: 07/11/2024    Other orders  -     furosemide (LASIX) 20 MG tablet; Take 1 tablet (20 mg total) by mouth once daily.  Dispense: 5 tablet; Refill: 0  -     potassium chloride SA (K-DUR,KLOR-CON) 20 MEQ tablet; Take 1 tablet (20 mEq total) by mouth once daily. for 5 days  Dispense: 5 tablet; Refill: 0    Hospital records and labs reviewed  advised patient that I suspect she has peripheral vascular disease and the car ride exacerbated her symptoms.  Advised to wear compression stockings anytime she is doing any type of prolonged traveling with limited mobility.  We will treat with short course of Lasix along with potassium.  Advised on compression stockings.  We will schedule ultrasound of lower extremities to rule out DVTs patient plans to get on a plane next week.  Risks, benefits, and side effects were discussed with the patient. All questions were answered to the fullest satisfaction of the patient, and pt verbalized understanding and agreement to treatment plan. Pt was to call with any new or worsening symptoms, or present to the ER.  RTC prjustine Gallagher MD  Family Medicine Physician   [facility]     This note was created using M*Modal voice recognition software that occasionally may misinterpret phrases or words.

## 2024-07-17 ENCOUNTER — HOSPITAL ENCOUNTER (OUTPATIENT)
Dept: RADIOLOGY | Facility: HOSPITAL | Age: 74
Discharge: HOME | End: 2024-07-17
Attending: FAMILY MEDICINE
Payer: MEDICARE

## 2024-07-17 DIAGNOSIS — R60.0 BILATERAL LOWER EXTREMITY EDEMA: ICD-10-CM

## 2024-07-17 PROCEDURE — 93970 EXTREMITY STUDY: CPT | Mod: TC

## 2024-07-17 PROCEDURE — 93970 EXTREMITY STUDY: CPT | Mod: 26,,, | Performed by: RADIOLOGY

## 2024-07-19 ENCOUNTER — PATIENT MESSAGE (OUTPATIENT)
Dept: FAMILY MEDICINE | Facility: CLINIC | Age: 74
End: 2024-07-19
Payer: MEDICARE

## 2024-07-19 RX ORDER — POTASSIUM CHLORIDE 20 MEQ/1
20 TABLET, EXTENDED RELEASE ORAL DAILY
Qty: 30 TABLET | Refills: 0 | Status: SHIPPED | OUTPATIENT
Start: 2024-07-19 | End: 2024-08-18

## 2024-07-19 RX ORDER — FUROSEMIDE 20 MG/1
20 TABLET ORAL DAILY
Qty: 30 TABLET | Refills: 0 | Status: SHIPPED | OUTPATIENT
Start: 2024-07-19

## 2024-08-11 NOTE — TELEPHONE ENCOUNTER
Refill Routing Note   Medication(s) are not appropriate for processing by [facility] for the following reason(s):        New or recently adjusted medication    [facility] action(s):  Defer             Appointments  past 12m or future 3m with PCP    Date Provider   Last Visit   7/11/2024 Jazzy Gallagher MD   Next Visit   Visit date not found Jazzy Gallagher MD   ED visits in past 90 days: 1        Note composed:2:10 AM 08/11/2024

## 2024-08-12 RX ORDER — POTASSIUM CHLORIDE 20 MEQ/1
20 TABLET, EXTENDED RELEASE ORAL
Qty: 90 TABLET | Refills: 1 | Status: SHIPPED | OUTPATIENT
Start: 2024-08-12

## 2024-08-12 RX ORDER — FUROSEMIDE 20 MG/1
20 TABLET ORAL
Qty: 90 TABLET | Refills: 1 | Status: SHIPPED | OUTPATIENT
Start: 2024-08-12

## 2024-08-21 ENCOUNTER — CLINICAL SUPPORT (OUTPATIENT)
Dept: REHABILITATION | Facility: HOSPITAL | Age: 74
End: 2024-08-21
Payer: MEDICARE

## 2024-08-21 DIAGNOSIS — Z74.09 IMPAIRED FUNCTIONAL MOBILITY, BALANCE, GAIT, AND ENDURANCE: Primary | ICD-10-CM

## 2024-08-21 PROCEDURE — 97110 THERAPEUTIC EXERCISES: CPT | Mod: PN

## 2024-08-21 PROCEDURE — 97112 NEUROMUSCULAR REEDUCATION: CPT | Mod: PN

## 2024-08-21 NOTE — PROGRESS NOTES
[facility]   Physical Therapy Treatment Note      Name: Yaneth DELUCA [facility] Number: [id number]    Therapy Diagnosis:   Encounter Diagnosis   Name Primary?    Impaired functional mobility, balance, gait, and endurance Yes     Physician: Jazzy Gallagher MD    Visit Date: 8/21/2024    Therapy Diagnosis:        Encounter Diagnoses   Name Primary?    SI (sacroiliac) joint dysfunction      Injury caused by bending, sequela      Impaired functional mobility, balance, gait, and endurance Yes        Physician: Jazzy Gallagher MD     Physician Orders: PT Eval and Treat   Medical Diagnosis from Referral: M53.3 (ICD-10-CM) - SI (sacroiliac) joint dysfunction X50.1XXS (ICD-10-CM) - Injury caused by bending, sequela  Evaluation Date: 7/10/2024  Authorization Period Expiration: 12/31/24  Plan of Care Expiration: 10/10/24  Progress Note Due: 10/10/24  Date of Surgery: none at this time  Visit # / Visits authorized: 1/ 12   FOTO: 1/ 3     Precautions: Standard and Fall      Time In: 112  Time Out: 200  Total Billable Time: 45 minutes    PTA Visit #: 0/5       Subjective     Patient reports: she had a percy time on her [facility] vacation. She is still having back pain at this time  She was compliant with home exercise program.  Response to previous treatment: na  Functional change: na    Pain: 4/10  Location: bilateral back      Objective      Objective Measures updated at progress report unless specified.     Treatment     Yaneth received the treatments listed below:      therapeutic exercises to develop strength and endurance for 10 minutes including:  Bike 15 level 3   Hip 3 way  SLR 10x  Mini squats 10x  Bridges 10x    neuromuscular re-education activities to improve: Balance and Coordination for 28 minutes. The following activities were included:  FUS 10  LSU 10x  Calf stretch 3 x 30 sh  Hip add ball 2 min       Patient Education and Home Exercises       Education provided:   - POC, HEP    Written Home  Exercises Provided: Pt instructed to continue prior HEP. Exercises were reviewed and Yaneth was able to demonstrate them prior to the end of the session.  Yaneth demonstrated good  understanding of the education provided. See Electronic Medical Record under Patient Instructions for exercises provided during therapy sessions    Assessment     Yaneth tolerated treatment well at this time.     Yaneth Is not progressing well towards her goals.   Patient prognosis is Excellent.     Patient will continue to benefit from skilled outpatient physical therapy to address the deficits listed in the problem list box on initial evaluation, provide pt/family education and to maximize pt's level of independence in the home and community environment.     Patient's spiritual, cultural and educational needs considered and pt agreeable to plan of care and goals.     Anticipated barriers to physical therapy: na    Goals:  Short Term Goals: 3 weeks   Pt will be compliant with HEP.  Pt will improve quad strength by 1/2 grade to allow for strength to go up and down stairs.   Pt will improve sit <>  30 sec to at least 10 allow for increased functional mobility.     Long Term Goals: 6 weeks   Pt will be independent with HEP to allow for increased functional tasks.  Pt will improve FOTO by 10 % to demonstrate improvement in quality of life.  Pt will improve hip flexor strength by 1/2 grade to allow for normalized body mechanics.   Plan      Plan of care Certification: 7/10/2024 to 10/10/24.     Outpatient Physical Therapy 2 times weekly for 6 weeks to include the following interventions: Aquatic Therapy, Electrical Stimulation  , Gait Training, Manual Therapy, Moist Heat/ Ice, Neuromuscular Re-ed, Patient Education, Therapeutic Activities, Therapeutic Exercise, and Ultrasound.        Paradise Rios, PT

## 2024-08-27 ENCOUNTER — CLINICAL SUPPORT (OUTPATIENT)
Dept: REHABILITATION | Facility: HOSPITAL | Age: 74
End: 2024-08-27
Payer: MEDICARE

## 2024-08-27 DIAGNOSIS — Z74.09 IMPAIRED FUNCTIONAL MOBILITY, BALANCE, GAIT, AND ENDURANCE: Primary | ICD-10-CM

## 2024-08-27 PROCEDURE — 97140 MANUAL THERAPY 1/> REGIONS: CPT | Mod: PN

## 2024-08-27 PROCEDURE — 97110 THERAPEUTIC EXERCISES: CPT | Mod: PN

## 2024-08-27 NOTE — PROGRESS NOTES
[facility]   Physical Therapy Treatment Note      Name: Yaneth DELUCA [facility] Number: [id number]    Therapy Diagnosis:   Encounter Diagnosis   Name Primary?    Impaired functional mobility, balance, gait, and endurance Yes     Physician: Jazzy Gallagher MD    Visit Date: 8/27/2024    Therapy Diagnosis:        Encounter Diagnoses   Name Primary?    SI (sacroiliac) joint dysfunction      Injury caused by bending, sequela      Impaired functional mobility, balance, gait, and endurance Yes        Physician: Jazzy Gallagher MD     Physician Orders: PT Eval and Treat   Medical Diagnosis from Referral: M53.3 (ICD-10-CM) - SI (sacroiliac) joint dysfunction X50.1XXS (ICD-10-CM) - Injury caused by bending, sequela  Evaluation Date: 7/10/2024  Authorization Period Expiration: 12/31/24  Plan of Care Expiration: 10/10/24  Progress Note Due: 10/10/24  Date of Surgery: none at this time  Visit # / Visits authorized: 1/ 12   FOTO: 1/ 3     Precautions: Standard and Fall      Time In: 112  Time Out: 200  Total Billable Time: 45 minutes    PTA Visit #: 0/5       Subjective     Patient reports: she had a percy time on her [facility] vacation. She is still having back pain at this time  She was compliant with home exercise program.  Response to previous treatment: na  Functional change: na    Pain: 4/10  Location: bilateral back      Objective      Objective Measures updated at progress report unless specified.     Treatment     Yaneth received the treatments listed below:      therapeutic exercises to develop strength and endurance for 28 minutes including:  Bike 15 level 3   Hip 3 way  SLR 10x  Mini squats 10x  Bridges 10x    neuromuscular re-education activities to improve: Balance and Coordination for 0 minutes. The following activities were included:  FUS 10  LSU 10x  Calf stretch 3 x 30 sh  Hip add ball 2 min     Manual therapy: 15 minutes  Pt in right sidelying- Left hip soft tissue mobilization with  increased muscle tightness along It band, left PSIS soreness noted,       Patient Education and Home Exercises       Education provided:   - POC, HEP    Written Home Exercises Provided: Pt instructed to continue prior HEP. Exercises were reviewed and Yaneth was able to demonstrate them prior to the end of the session.  Yaneth demonstrated good  understanding of the education provided. See Electronic Medical Record under Patient Instructions for exercises provided during therapy sessions    Assessment     Yaneth tolerated treatment well at this time. Once muscles loosened with STM pt was able to obtain more ROM to increase muscle strength.     Yaneth Is not progressing well towards her goals.   Patient prognosis is Excellent.     Patient will continue to benefit from skilled outpatient physical therapy to address the deficits listed in the problem list box on initial evaluation, provide pt/family education and to maximize pt's level of independence in the home and community environment.     Patient's spiritual, cultural and educational needs considered and pt agreeable to plan of care and goals.     Anticipated barriers to physical therapy: na    Goals:  Short Term Goals: 3 weeks   Pt will be compliant with HEP.  Pt will improve quad strength by 1/2 grade to allow for strength to go up and down stairs.   Pt will improve sit <>  30 sec to at least 10 allow for increased functional mobility.     Long Term Goals: 6 weeks   Pt will be independent with HEP to allow for increased functional tasks.  Pt will improve FOTO by 10 % to demonstrate improvement in quality of life.  Pt will improve hip flexor strength by 1/2 grade to allow for normalized body mechanics.   Plan      Plan of care Certification: 7/10/2024 to 10/10/24.     Outpatient Physical Therapy 2 times weekly for 6 weeks to include the following interventions: Aquatic Therapy, Electrical Stimulation  , Gait Training, Manual Therapy, Moist Heat/ Ice,  Neuromuscular Re-ed, Patient Education, Therapeutic Activities, Therapeutic Exercise, and Ultrasound.        Paradise Rios, PT

## 2024-08-29 ENCOUNTER — CLINICAL SUPPORT (OUTPATIENT)
Dept: REHABILITATION | Facility: HOSPITAL | Age: 74
End: 2024-08-29
Payer: MEDICARE

## 2024-08-29 DIAGNOSIS — Z74.09 IMPAIRED FUNCTIONAL MOBILITY, BALANCE, GAIT, AND ENDURANCE: Primary | ICD-10-CM

## 2024-08-29 PROCEDURE — 97112 NEUROMUSCULAR REEDUCATION: CPT | Mod: PN,CQ

## 2024-08-29 PROCEDURE — 97110 THERAPEUTIC EXERCISES: CPT | Mod: PN,CQ

## 2024-08-29 NOTE — PROGRESS NOTES
"[facility]   Physical Therapy Treatment Note      Name: Yaneth DELUCA [facility] Number: [id number]    Therapy Diagnosis:   Encounter Diagnosis   Name Primary?    Impaired functional mobility, balance, gait, and endurance Yes     Physician: Jazzy Gallagher MD    Visit Date: 8/29/2024    Therapy Diagnosis:        Encounter Diagnoses   Name Primary?    SI (sacroiliac) joint dysfunction      Injury caused by bending, sequela      Impaired functional mobility, balance, gait, and endurance Yes        Physician: Jazzy Gallagher MD     Physician Orders: PT Eval and Treat   Medical Diagnosis from Referral: M53.3 (ICD-10-CM) - SI (sacroiliac) joint dysfunction X50.1XXS (ICD-10-CM) - Injury caused by bending, sequela  Evaluation Date: 7/10/2024  Authorization Period Expiration: 12/31/24  Plan of Care Expiration: 10/10/24  Progress Note Due: 10/10/24  Date of Surgery: none at this time  Visit # / Visits authorized: 3 / 12   FOTO: 1/ 3     Precautions: Standard and Fall      Time In: 10:30 AM  Time Out: 11:15 AM  Total Billable Time: 40 minutes    PTA Visit #: 1/5       Subjective     Patient reports: No new c/o's.   She was compliant with home exercise program.  Response to previous treatment: na  Functional change: na    Pain: 5/10  Location: bilateral back      Objective      Objective Measures updated at progress report unless specified.     Treatment     Yaneth received the treatments listed below:      therapeutic exercises to develop strength and endurance for 30 minutes including:  Bike 15 level 3   Nustep level 5 x 15 min  Standing Hip Flex/Abd/Ext x 10  SLR 10x  Mini squats 10x  Bridges 10x  DKC w/ PB x 2 min  LTR w/ PB x 2 min      neuromuscular re-education activities to improve: Balance and Coordination for 10 minutes. The following activities were included:  Toe Taps on 6" step x 2 min  FSU on 6" step x 15  LSU on 6" step x 15  Calf stretch 3 x 30 sec  Hip add ball 2 min     Manual " therapy:  minutes  Pt in right sidelying- Left hip soft tissue mobilization with increased muscle tightness along It band, left PSIS soreness noted,       Patient Education and Home Exercises       Education provided:   - POC, HEP    Written Home Exercises Provided: Pt instructed to continue prior HEP. Exercises were reviewed and Yaneth was able to demonstrate them prior to the end of the session.  Yaneth demonstrated good  understanding of the education provided. See Electronic Medical Record under Patient Instructions for exercises provided during therapy sessions    Assessment     Yaneth did well with exercises. No exacerbations noted.     Yaneth Is not progressing well towards her goals.   Patient prognosis is Excellent.     Patient will continue to benefit from skilled outpatient physical therapy to address the deficits listed in the problem list box on initial evaluation, provide pt/family education and to maximize pt's level of independence in the home and community environment.     Patient's spiritual, cultural and educational needs considered and pt agreeable to plan of care and goals.     Anticipated barriers to physical therapy: na    Goals:  Short Term Goals: 3 weeks   Pt will be compliant with HEP.  Pt will improve quad strength by 1/2 grade to allow for strength to go up and down stairs.   Pt will improve sit <>  30 sec to at least 10 allow for increased functional mobility.     Long Term Goals: 6 weeks   Pt will be independent with HEP to allow for increased functional tasks.  Pt will improve FOTO by 10 % to demonstrate improvement in quality of life.  Pt will improve hip flexor strength by 1/2 grade to allow for normalized body mechanics.   Plan      Plan of care Certification: 7/10/2024 to 10/10/24.     Outpatient Physical Therapy 2 times weekly for 6 weeks to include the following interventions: Aquatic Therapy, Electrical Stimulation  , Gait Training, Manual Therapy, Moist Heat/ Ice,  Neuromuscular Re-ed, Patient Education, Therapeutic Activities, Therapeutic Exercise, and Ultrasound.        Jonathan Favre, PTA

## 2024-09-03 ENCOUNTER — CLINICAL SUPPORT (OUTPATIENT)
Dept: REHABILITATION | Facility: HOSPITAL | Age: 74
End: 2024-09-03
Payer: MEDICARE

## 2024-09-03 DIAGNOSIS — Z74.09 IMPAIRED FUNCTIONAL MOBILITY, BALANCE, GAIT, AND ENDURANCE: Primary | ICD-10-CM

## 2024-09-03 PROCEDURE — 97110 THERAPEUTIC EXERCISES: CPT | Mod: PN

## 2024-09-03 PROCEDURE — 97112 NEUROMUSCULAR REEDUCATION: CPT | Mod: PN

## 2024-09-03 NOTE — PROGRESS NOTES
"[facility]   Physical Therapy Treatment Note      Name: Yaneth DELUCA [facility] Number: [id number]    Therapy Diagnosis:   Encounter Diagnosis   Name Primary?    Impaired functional mobility, balance, gait, and endurance Yes     Physician: Jazzy Gallagher MD    Visit Date: 9/3/2024    Therapy Diagnosis:        Encounter Diagnoses   Name Primary?    SI (sacroiliac) joint dysfunction      Injury caused by bending, sequela      Impaired functional mobility, balance, gait, and endurance Yes        Physician: Jazzy Gallagher MD     Physician Orders: PT Eval and Treat   Medical Diagnosis from Referral: M53.3 (ICD-10-CM) - SI (sacroiliac) joint dysfunction X50.1XXS (ICD-10-CM) - Injury caused by bending, sequela  Evaluation Date: 7/10/2024  Authorization Period Expiration: 12/31/24  Plan of Care Expiration: 10/10/24  Progress Note Due: 10/10/24  Date of Surgery: none at this time  Visit # / Visits authorized: 3 / 12   FOTO: 1/ 3     Precautions: Standard and Fall      Time In: 10:30 AM  Time Out: 11:15 AM  Total Billable Time: 40 minutes    PTA Visit #: 1/5       Subjective     Patient reports: No new c/o's.   She was compliant with home exercise program.  Response to previous treatment: na  Functional change: na    Pain: 5/10  Location: bilateral back      Objective      Objective Measures updated at progress report unless specified.     Treatment     Yaneth received the treatments listed below:      therapeutic exercises to develop strength and endurance for 30 minutes including:  Bike 15 level 3   Nustep level 5 x 15 min  Standing Hip Flex/Abd/Ext x 15  SLR 10x  Mini squats 10x  Bridges 10x  DKC w/ PB x 2 min  LTR w/ PB x 2 min      neuromuscular re-education activities to improve: Balance and Coordination for 10 minutes. The following activities were included:  Toe Taps on 6" step x 2 min  FSU on 6" step x 15  LSU on 6" step x 15  Calf stretch 3 x 30 sec  Hip add ball 2 min     Manual " therapy:  minutes  Pt in right sidelying- Left hip soft tissue mobilization with increased muscle tightness along It band, left PSIS soreness noted,       Patient Education and Home Exercises       Education provided:   - POC, HEP    Written Home Exercises Provided: Pt instructed to continue prior HEP. Exercises were reviewed and Yaneth was able to demonstrate them prior to the end of the session.  Yaneth demonstrated good  understanding of the education provided. See Electronic Medical Record under Patient Instructions for exercises provided during therapy sessions    Assessment     Yaneth did well with exercises. At this time. Continue with PT at this time.     Yaneth Is not progressing well towards her goals.   Patient prognosis is Excellent.     Patient will continue to benefit from skilled outpatient physical therapy to address the deficits listed in the problem list box on initial evaluation, provide pt/family education and to maximize pt's level of independence in the home and community environment.     Patient's spiritual, cultural and educational needs considered and pt agreeable to plan of care and goals.     Anticipated barriers to physical therapy: na    Goals:  Short Term Goals: 3 weeks   Pt will be compliant with HEP.  Pt will improve quad strength by 1/2 grade to allow for strength to go up and down stairs.   Pt will improve sit <>  30 sec to at least 10 allow for increased functional mobility.     Long Term Goals: 6 weeks   Pt will be independent with HEP to allow for increased functional tasks.  Pt will improve FOTO by 10 % to demonstrate improvement in quality of life.  Pt will improve hip flexor strength by 1/2 grade to allow for normalized body mechanics.   Plan      Plan of care Certification: 7/10/2024 to 10/10/24.     Outpatient Physical Therapy 2 times weekly for 6 weeks to include the following interventions: Aquatic Therapy, Electrical Stimulation  , Gait Training, Manual Therapy, Moist  Heat/ Ice, Neuromuscular Re-ed, Patient Education, Therapeutic Activities, Therapeutic Exercise, and Ultrasound.        Paradise Rios, PT

## 2024-09-05 ENCOUNTER — CLINICAL SUPPORT (OUTPATIENT)
Dept: REHABILITATION | Facility: HOSPITAL | Age: 74
End: 2024-09-05
Payer: MEDICARE

## 2024-09-05 DIAGNOSIS — Z74.09 IMPAIRED FUNCTIONAL MOBILITY, BALANCE, GAIT, AND ENDURANCE: Primary | ICD-10-CM

## 2024-09-05 PROCEDURE — 97110 THERAPEUTIC EXERCISES: CPT | Mod: PN,CQ

## 2024-09-05 PROCEDURE — 97112 NEUROMUSCULAR REEDUCATION: CPT | Mod: PN,CQ

## 2024-09-05 NOTE — PROGRESS NOTES
"[facility]   Physical Therapy Treatment Note      Name: Yaneth DELUCA [facility] Number: [id number]    Therapy Diagnosis:   Encounter Diagnosis   Name Primary?    Impaired functional mobility, balance, gait, and endurance Yes     Physician: Jazzy Gallagher MD    Visit Date: 9/5/2024    Therapy Diagnosis:        Encounter Diagnoses   Name Primary?    SI (sacroiliac) joint dysfunction      Injury caused by bending, sequela      Impaired functional mobility, balance, gait, and endurance Yes        Physician: Jazzy Gallagher MD     Physician Orders: PT Eval and Treat   Medical Diagnosis from Referral: M53.3 (ICD-10-CM) - SI (sacroiliac) joint dysfunction X50.1XXS (ICD-10-CM) - Injury caused by bending, sequela  Evaluation Date: 7/10/2024  Authorization Period Expiration: 12/31/24  Plan of Care Expiration: 10/10/24  Progress Note Due: 10/10/24  Date of Surgery: none at this time  Visit # / Visits authorized: 4 / 12   FOTO: 1/ 3     Precautions: Standard and Fall      Time In: 8:10 AM  Time Out: 8:55 AM  Total Billable Time: 40 minutes    PTA Visit #: 1/5       Subjective     Patient reports: Feeling about the same.  She was compliant with home exercise program.  Response to previous treatment: na  Functional change: na    Pain: 4/10  Location: bilateral back      Objective      Objective Measures updated at progress report unless specified.     Treatment     Yaneth received the treatments listed below:      therapeutic exercises to develop strength and endurance for 30 minutes including:  Bike 15 level 3   Nustep level 5 x 15 min  Standing Hip Flex/Abd/Ext x 15  SLR 10x  Mini squats 10x  Bridges 10x  DKC w/ PB x 2 min  LTR w/ PB x 2 min      neuromuscular re-education activities to improve: Balance and Coordination for 10 minutes. The following activities were included:  Toe Taps on 6" step x 2 min  FSU on 6" step x 15  LSU on 6" step x 15  Calf stretch 3 x 30 sec  Hip add ball 2 min     Manual " therapy:  minutes  Pt in right sidelying- Left hip soft tissue mobilization with increased muscle tightness along It band, left PSIS soreness noted,       Patient Education and Home Exercises       Education provided:   - POC, HEP    Written Home Exercises Provided: Pt instructed to continue prior HEP. Exercises were reviewed and Yaneth was able to demonstrate them prior to the end of the session.  Yaneth demonstrated good  understanding of the education provided. See Electronic Medical Record under Patient Instructions for exercises provided during therapy sessions    Assessment     Yaneth did well with exercises. No exacerbations noted.     Yaneth Is not progressing well towards her goals.   Patient prognosis is Excellent.     Patient will continue to benefit from skilled outpatient physical therapy to address the deficits listed in the problem list box on initial evaluation, provide pt/family education and to maximize pt's level of independence in the home and community environment.     Patient's spiritual, cultural and educational needs considered and pt agreeable to plan of care and goals.     Anticipated barriers to physical therapy: na    Goals:  Short Term Goals: 3 weeks   Pt will be compliant with HEP.  Pt will improve quad strength by 1/2 grade to allow for strength to go up and down stairs.   Pt will improve sit <>  30 sec to at least 10 allow for increased functional mobility.     Long Term Goals: 6 weeks   Pt will be independent with HEP to allow for increased functional tasks.  Pt will improve FOTO by 10 % to demonstrate improvement in quality of life.  Pt will improve hip flexor strength by 1/2 grade to allow for normalized body mechanics.   Plan      Plan of care Certification: 7/10/2024 to 10/10/24.     Outpatient Physical Therapy 2 times weekly for 6 weeks to include the following interventions: Aquatic Therapy, Electrical Stimulation  , Gait Training, Manual Therapy, Moist Heat/ Ice,  Neuromuscular Re-ed, Patient Education, Therapeutic Activities, Therapeutic Exercise, and Ultrasound.        Jonathan Favre, PTA

## 2024-09-10 ENCOUNTER — CLINICAL SUPPORT (OUTPATIENT)
Dept: REHABILITATION | Facility: HOSPITAL | Age: 74
End: 2024-09-10
Payer: MEDICARE

## 2024-09-10 DIAGNOSIS — Z74.09 IMPAIRED FUNCTIONAL MOBILITY, BALANCE, GAIT, AND ENDURANCE: Primary | ICD-10-CM

## 2024-09-10 PROCEDURE — 97112 NEUROMUSCULAR REEDUCATION: CPT | Mod: PN,CQ

## 2024-09-10 PROCEDURE — 97110 THERAPEUTIC EXERCISES: CPT | Mod: PN,CQ

## 2024-09-10 NOTE — PROGRESS NOTES
"[facility]   Physical Therapy Treatment Note      Name: Yaneth DELUCA [facility] Number: [id number]    Therapy Diagnosis:   Encounter Diagnosis   Name Primary?    Impaired functional mobility, balance, gait, and endurance Yes     Physician: Jazzy Gallagher MD    Visit Date: 9/10/2024    Therapy Diagnosis:        Encounter Diagnoses   Name Primary?    SI (sacroiliac) joint dysfunction      Injury caused by bending, sequela      Impaired functional mobility, balance, gait, and endurance Yes        Physician: Jazzy Gallagher MD     Physician Orders: PT Eval and Treat   Medical Diagnosis from Referral: M53.3 (ICD-10-CM) - SI (sacroiliac) joint dysfunction X50.1XXS (ICD-10-CM) - Injury caused by bending, sequela  Evaluation Date: 7/10/2024  Authorization Period Expiration: 12/31/24  Plan of Care Expiration: 10/10/24  Progress Note Due: 10/10/24  Date of Surgery: none at this time  Visit # / Visits authorized: 6 / 12   FOTO: 1/ 3     Precautions: Standard and Fall      Time In: 8:40 AM  Time Out: 9:30 AM  Total Billable Time: 25 minutes split double booking    PTA Visit #: 2/5       Subjective     Patient reports: planning to go out of town in two weeks.  She was compliant with home exercise program.  Response to previous treatment: ok  Functional change: na    Pain: 4/10  Location: bilateral back      Objective      Objective Measures updated at progress report unless specified.     Treatment     Yaneth received the treatments listed below:      therapeutic exercises to develop strength and endurance for 30 minutes including:  Bike 15 level 3   Nustep level 5 x 15 min  Standing Hip Flex/Abd/Ext x 15  SLR 10x  Mini squats 10x  Bridges 10x  Ball squeeze x 2 min  DKC w/ PB x 2 min  LTR w/ PB x 2 min      neuromuscular re-education activities to improve: Balance and Coordination for 10 minutes. The following activities were included:  Toe Taps on 6" step x 2 min  FSU on 6" step x 15  LSU on 6" step " x 15  Calf stretch 3 x 30 sec  Hip add ball 2 min     Manual therapy:  minutes  Pt in right sidelying- Left hip soft tissue mobilization with increased muscle tightness along It band, left PSIS soreness noted,       Patient Education and Home Exercises       Education provided:   - POC, HEP    Written Home Exercises Provided: Pt instructed to continue prior HEP. Exercises were reviewed and Yaneth was able to demonstrate them prior to the end of the session.  Yaneth demonstrated good  understanding of the education provided. See Electronic Medical Record under Patient Instructions for exercises provided during therapy sessions    Assessment     Yaneth did well with exercises. No exacerbations noted.     Yaneth Is not progressing well towards her goals.   Patient prognosis is Excellent.     Patient will continue to benefit from skilled outpatient physical therapy to address the deficits listed in the problem list box on initial evaluation, provide pt/family education and to maximize pt's level of independence in the home and community environment.     Patient's spiritual, cultural and educational needs considered and pt agreeable to plan of care and goals.     Anticipated barriers to physical therapy: na    Goals:  Short Term Goals: 3 weeks   Pt will be compliant with HEP.  Pt will improve quad strength by 1/2 grade to allow for strength to go up and down stairs.   Pt will improve sit <>  30 sec to at least 10 allow for increased functional mobility.     Long Term Goals: 6 weeks   Pt will be independent with HEP to allow for increased functional tasks.  Pt will improve FOTO by 10 % to demonstrate improvement in quality of life.  Pt will improve hip flexor strength by 1/2 grade to allow for normalized body mechanics.   Plan      Plan of care Certification: 7/10/2024 to 10/10/24.     Outpatient Physical Therapy 2 times weekly for 6 weeks to include the following interventions: Aquatic Therapy, Electrical Stimulation   , Gait Training, Manual Therapy, Moist Heat/ Ice, Neuromuscular Re-ed, Patient Education, Therapeutic Activities, Therapeutic Exercise, and Ultrasound.        Esdras Lal, PTA

## 2024-09-12 ENCOUNTER — CLINICAL SUPPORT (OUTPATIENT)
Dept: REHABILITATION | Facility: HOSPITAL | Age: 74
End: 2024-09-12
Payer: MEDICARE

## 2024-09-12 DIAGNOSIS — Z74.09 IMPAIRED FUNCTIONAL MOBILITY, BALANCE, GAIT, AND ENDURANCE: Primary | ICD-10-CM

## 2024-09-12 PROCEDURE — 97110 THERAPEUTIC EXERCISES: CPT | Mod: PN

## 2024-09-12 PROCEDURE — 97014 ELECTRIC STIMULATION THERAPY: CPT | Mod: PN

## 2024-09-12 PROCEDURE — 97140 MANUAL THERAPY 1/> REGIONS: CPT | Mod: PN

## 2024-09-12 PROCEDURE — 97112 NEUROMUSCULAR REEDUCATION: CPT | Mod: PN

## 2024-09-12 NOTE — PROGRESS NOTES
[facility]   Physical Therapy Treatment Note      Name: Yaneth DELUCA [facility] Number: [id number]    Therapy Diagnosis:   Encounter Diagnosis   Name Primary?    Impaired functional mobility, balance, gait, and endurance Yes     Physician: Jazzy Gallagher MD    Visit Date: 9/12/2024    Therapy Diagnosis:        Encounter Diagnoses   Name Primary?    SI (sacroiliac) joint dysfunction      Injury caused by bending, sequela      Impaired functional mobility, balance, gait, and endurance Yes        Physician: Jazzy Gallagher MD     Physician Orders: PT Eval and Treat   Medical Diagnosis from Referral: M53.3 (ICD-10-CM) - SI (sacroiliac) joint dysfunction X50.1XXS (ICD-10-CM) - Injury caused by bending, sequela  Evaluation Date: 7/10/2024  Authorization Period Expiration: 12/31/24  Plan of Care Expiration: 10/10/24  Progress Note Due: 10/10/24  Date of Surgery: none at this time  Visit # / Visits authorized: 6 / 12   FOTO: 1/ 3     Precautions: Standard and Fall      Time In: 10:30  AM  Time Out: 11:45 AM  Total Billable Time: 75 mins     PTA Visit #: 0/5       Subjective     Patient reports: planning to go out of town in two weeks. Continues to have consistent pain left side of lumbar spine and into left buttock> hip; reports being present for over 2 years, never goes away; but really hasn't done anything for it - current PT is really the first thing beside medicine.   She was compliant with home exercise program.  Response to previous treatment: ok  Functional change: na, still really stiff and having pain     Pain: 4-5/10  Location: bilateral back - Left > Right, into left buttock     Objective      Objective Measures updated at progress report unless specified.     Treatment     Yaneth received the treatments listed below:      therapeutic exercises to develop strength and endurance for  20  minutes including:  Bike 15 level 3   Nustep level 5 x 15 min  Standing Hip Flex/Abd/Ext x 15  SLR  "10x  Mini squats 10x  Bridges 10x  Ball squeeze x 2 min  DKC w/ PB x 2 min  LTR w/ PB x 2 min  SKC x 10       neuromuscular re-education activities to improve: Balance and Coordination for 15  minutes. The following activities were included:  Posterior pelvic tilt - 5"h x 10   Seated H/S stretching with stool - 30 sec x 3   Seated Lumbar flexion - multi-directions with use of SB - use an office  chair at home.   Toe Taps on 6" step x 2 min  FSU on 6" step x 15  LSU on 6" step x 15  Calf stretch 3 x 30 sec  Hip add ball 2 min     See EMR under MEDIA for written consent provided. - discussed trial of dry needling to help affect her pain and myofascial tightness as she reports not much else has really helped for more than a few hours. Patient was agreeable to giving it a try.     Palpation Assessment to determine the necessity for Functional Dry Needling  significant tenderness left side of lumbar spine - lumbar fascia; piriformis, quadratus, significant fascia tightness noted with reduction in lumbar/hip AROM.     Yaneth received the following manual therapy techniques x 35  mins : S/L on right - lumbar  L1 > L5/S1 segmental flexion with blocking of segment above, performed this on both sides; Prone - IASTM to lumbar fascia moving into left piriformis; Lamina release in lumbar spine; side bending of lumbar in prone;   Patient positioned with pillow support under pelvis, ankles and head/neck: LF-ES per protocol for lumbar pain including quadratus, piriformis utilizing 60 and 75 mm needles; clockwise winding for increased tissue grasp;  Intensity of stimulation adjusted to patient tolerance; fair rhythmical contraction of tissue noted; patient able to tolerate tx x 20 mins.  Removed needles without adverse effects; Reviewed what to expect later with patient; discussed importance of hydration.     Manual therapy:  minutes      Patient Education and Home Exercises       Education provided:   -What to expect following dry " needling   - Updated HEP   - POC, HEP    Written Home Exercises Provided: Pt instructed to continue prior HEP. Exercises were reviewed and Yaneth was able to demonstrate them prior to the end of the session.  Yaneth demonstrated good  understanding of the education provided. See Electronic Medical Record under Patient Instructions for exercises provided during therapy sessions    Assessment     Yaneth was having a rough day today; having increased back and left buttock pain, Trial of functional dry needling along with some manual tx to address myofascial tightness; Also instructed in HEP for flexibility in back/Hamstrings for home.  Good tolerance of treatment today; will re-assess next visit.      Yaneth Is not progressing well towards her goals.   Patient prognosis is Excellent.     Patient will continue to benefit from skilled outpatient physical therapy to address the deficits listed in the problem list box on initial evaluation, provide pt/family education and to maximize pt's level of independence in the home and community environment.     Patient's spiritual, cultural and educational needs considered and pt agreeable to plan of care and goals.     Anticipated barriers to physical therapy: na    Goals:  Short Term Goals: 3 weeks   Pt will be compliant with HEP. > ongoing   Pt will improve quad strength by 1/2 grade to allow for strength to go up and down stairs.   Pt will improve sit <>  30 sec to at least 10 allow for increased functional mobility.     Long Term Goals: 6 weeks   Pt will be independent with HEP to allow for increased functional tasks.  Pt will improve FOTO by 10 % to demonstrate improvement in quality of life.  Pt will improve hip flexor strength by 1/2 grade to allow for normalized body mechanics.   Plan      Plan of care Certification: 7/10/2024 to 10/10/24.     Outpatient Physical Therapy 2 times weekly for 6 weeks to include the following interventions: Aquatic Therapy, Electrical  Stimulation  , Gait Training, Manual Therapy, Moist Heat/ Ice, Neuromuscular Re-ed, Patient Education, Therapeutic Activities, Therapeutic Exercise, and Ultrasound.        Teresa Pabon, PT

## 2024-09-17 ENCOUNTER — CLINICAL SUPPORT (OUTPATIENT)
Dept: REHABILITATION | Facility: HOSPITAL | Age: 74
End: 2024-09-17
Payer: MEDICARE

## 2024-09-17 DIAGNOSIS — Z74.09 IMPAIRED FUNCTIONAL MOBILITY, BALANCE, GAIT, AND ENDURANCE: Primary | ICD-10-CM

## 2024-09-17 PROCEDURE — 97110 THERAPEUTIC EXERCISES: CPT | Mod: PN

## 2024-09-17 PROCEDURE — 97140 MANUAL THERAPY 1/> REGIONS: CPT | Mod: PN

## 2024-09-17 NOTE — PROGRESS NOTES
[facility]   Physical Therapy Treatment Note      Name: Yaneth DELUCA [facility] Number: [id number]    Therapy Diagnosis:   Encounter Diagnosis   Name Primary?    Impaired functional mobility, balance, gait, and endurance Yes     Physician: Jazzy Gallagher MD    Visit Date: 9/17/2024    Therapy Diagnosis:        Encounter Diagnoses   Name Primary?    SI (sacroiliac) joint dysfunction      Injury caused by bending, sequela      Impaired functional mobility, balance, gait, and endurance Yes        Physician: Jazzy Gallagher MD     Physician Orders: PT Eval and Treat   Medical Diagnosis from Referral: M53.3 (ICD-10-CM) - SI (sacroiliac) joint dysfunction X50.1XXS (ICD-10-CM) - Injury caused by bending, sequela  Evaluation Date: 7/10/2024  Authorization Period Expiration: 12/31/24  Plan of Care Expiration: 10/10/24  Progress Note Due: 10/10/24  Date of Surgery: none at this time  Visit # / Visits authorized: 6 / 12   FOTO: 1/ 3     Precautions: Standard and Fall      Time In: 8:45  AM  Time Out: 9:30 AM  Total Billable Time: 45 mins     PTA Visit #: 0/5       Subjective     Patient reports: she felt really good after DN. She is going out of town soon and would like to feel better.   She was compliant with home exercise program.  Response to previous treatment: ok  Functional change: na, still really stiff and having pain     Pain: 4-5/10  Location: bilateral back - Left > Right, into left buttock     Objective      Objective Measures updated at progress report unless specified.     Treatment     Yaneth received the treatments listed below:      therapeutic exercises to develop strength and endurance for  30  minutes including:  Bike 15 level 3   Nustep level 5 x 15 min  Standing Hip Flex/Abd/Ext x 15  SLR 10x  Mini squats 10x  Bridges 10x  Ball squeeze x 2 min  DKC w/ PB x 2 min  LTR w/ PB x 2 min  SKC x 10       neuromuscular re-education activities to improve: Balance and Coordination for 0   "minutes. The following activities were included:  Posterior pelvic tilt - 5"h x 10   Seated H/S stretching with stool - 30 sec x 3   Seated Lumbar flexion - multi-directions with use of SB - use an office  chair at home.   Toe Taps on 6" step x 2 min  FSU on 6" step x 15  LSU on 6" step x 15  Calf stretch 3 x 30 sec  Hip add ball 2 min     See EMR under MEDIA for written consent provided. - discussed trial of dry needling to help affect her pain and myofascial tightness as she reports not much else has really helped for more than a few hours. Patient was agreeable to giving it a try.     Palpation Assessment to determine the necessity for Functional Dry Needling  significant tenderness left side of lumbar spine - lumbar fascia; piriformis, quadratus, significant fascia tightness noted with reduction in lumbar/hip AROM.     Yaneth received the following manual therapy techniques x 35  mins : S/L on right - lumbar  L1 > L5/S1 segmental flexion with blocking of segment above, performed this on both sides; Prone - IASTM to lumbar fascia moving into left piriformis; Lamina release in lumbar spine; side bending of lumbar in prone;   Patient positioned with pillow support under pelvis, ankles and head/neck: LF-ES per protocol for lumbar pain including quadratus, piriformis utilizing 60 and 75 mm needles; clockwise winding for increased tissue grasp;  Intensity of stimulation adjusted to patient tolerance; fair rhythmical contraction of tissue noted; patient able to tolerate tx x 20 mins.  Removed needles without adverse effects; Reviewed what to expect later with patient; discussed importance of hydration.     Manual therapy:  10 minutes  SL STM to gluteal and IT band, piriformis       Patient Education and Home Exercises       Education provided:   -What to expect following dry needling   - Updated HEP   - POC, HEP    Written Home Exercises Provided: Pt instructed to continue prior HEP. Exercises were reviewed and Yaneth " was able to demonstrate them prior to the end of the session.  Yaneth demonstrated good  understanding of the education provided. See Electronic Medical Record under Patient Instructions for exercises provided during therapy sessions    Assessment     Yaneth did great with treatment at this time.     Yaneth Is not progressing well towards her goals.   Patient prognosis is Excellent.     Patient will continue to benefit from skilled outpatient physical therapy to address the deficits listed in the problem list box on initial evaluation, provide pt/family education and to maximize pt's level of independence in the home and community environment.     Patient's spiritual, cultural and educational needs considered and pt agreeable to plan of care and goals.     Anticipated barriers to physical therapy: na    Goals:  Short Term Goals: 3 weeks   Pt will be compliant with HEP. > ongoing   Pt will improve quad strength by 1/2 grade to allow for strength to go up and down stairs.   Pt will improve sit <>  30 sec to at least 10 allow for increased functional mobility.     Long Term Goals: 6 weeks   Pt will be independent with HEP to allow for increased functional tasks.  Pt will improve FOTO by 10 % to demonstrate improvement in quality of life.  Pt will improve hip flexor strength by 1/2 grade to allow for normalized body mechanics.   Plan      Plan of care Certification: 7/10/2024 to 10/10/24.     Outpatient Physical Therapy 2 times weekly for 6 weeks to include the following interventions: Aquatic Therapy, Electrical Stimulation  , Gait Training, Manual Therapy, Moist Heat/ Ice, Neuromuscular Re-ed, Patient Education, Therapeutic Activities, Therapeutic Exercise, and Ultrasound.        Paradise Rios, PT

## 2024-09-19 ENCOUNTER — CLINICAL SUPPORT (OUTPATIENT)
Dept: REHABILITATION | Facility: HOSPITAL | Age: 74
End: 2024-09-19
Payer: MEDICARE

## 2024-09-19 DIAGNOSIS — Z74.09 IMPAIRED FUNCTIONAL MOBILITY, BALANCE, GAIT, AND ENDURANCE: Primary | ICD-10-CM

## 2024-09-19 PROCEDURE — 97110 THERAPEUTIC EXERCISES: CPT | Mod: KX,PN

## 2024-09-19 PROCEDURE — 97140 MANUAL THERAPY 1/> REGIONS: CPT | Mod: KX,PN

## 2024-09-19 PROCEDURE — 97014 ELECTRIC STIMULATION THERAPY: CPT | Mod: KX,PN

## 2024-09-19 NOTE — PROGRESS NOTES
[facility]   Physical Therapy Treatment Note      Name: Yaneth DELUCA [facility] Number: [id number]    Therapy Diagnosis:   Encounter Diagnosis   Name Primary?    Impaired functional mobility, balance, gait, and endurance Yes     Physician: Jazzy Gallagher MD    Visit Date: 9/19/2024    Therapy Diagnosis:        Encounter Diagnoses   Name Primary?    SI (sacroiliac) joint dysfunction      Injury caused by bending, sequela      Impaired functional mobility, balance, gait, and endurance Yes        Physician: Jazzy Gallagher MD     Physician Orders: PT Eval and Treat   Medical Diagnosis from Referral: M53.3 (ICD-10-CM) - SI (sacroiliac) joint dysfunction X50.1XXS (ICD-10-CM) - Injury caused by bending, sequela  Evaluation Date: 7/10/2024  Authorization Period Expiration: 12/31/24  Plan of Care Expiration: 10/10/24  Progress Note Due: 10/10/24  Date of Surgery: none at this time  Visit # / Visits authorized: 9 / 12   FOTO: 1/ 3     Precautions: Standard and Fall      Time In:  2:00 pm   Time Out: 3:00 pm    Total Billable Time: 60 mins     PTA Visit #: 0/5       Subjective     Patient reports: my pain is down, I still can't believe how much better I felt after the dry needling last week.   She was compliant with home exercise program.  Response to previous treatment: ok  Functional change: pain less, feels like movement is not as difficult     Pain: 2-3 /10  Location: bilateral back - Left > Right, into left buttock     Objective      Objective Measures updated at progress report unless specified.     Treatment     Yaneth received the treatments listed below:      therapeutic exercises to develop strength and endurance for  15   minutes including:  Bike 15 level 3   Nustep level 5 x 15 min  Standing Hip Flex/Abd/Ext x 15  SLR 10x  Mini squats 10x  Bridges 10x  Ball squeeze x 2 min  DKC w/ PB x 2 min  LTR w/ PB x 2 min  SKC x 10       neuromuscular re-education activities to improve: Balance and  "Coordination for 0  minutes. The following activities were included:  Posterior pelvic tilt - 5"h x 10   Seated H/S stretching with stool - 30 sec x 3   Seated Lumbar flexion - multi-directions with use of SB - use an office  chair at home.   Toe Taps on 6" step x 2 min  FSU on 6" step x 15  LSU on 6" step x 15  Calf stretch 3 x 30 sec  Hip add ball 2 min     See EMR under MEDIA for written consent provided. -      Palpation Assessment to determine the necessity for Functional Dry Needling  significant tenderness left side of lumbar spine - lumbar fascia; piriformis, quadratus, significant fascia tightness noted with reduction in lumbar/hip AROM.     Yaneth received the following manual therapy techniques x 35  mins : S/L on right - lumbar  L1 > L5/S1 segmental flexion with blocking of segment above, performed this on both sides; Prone - IASTM to lumbar fascia moving into left piriformis; Lamina release in lumbar spine; side bending of lumbar in prone;   Patient positioned with pillow support under pelvis, ankles and head/neck: LF-ES per protocol for lumbar pain including quadratus, piriformis utilizing 60 and 75 mm needles; clockwise winding for increased tissue grasp;  Intensity of stimulation adjusted to patient tolerance; fair rhythmical contraction of tissue noted; patient able to tolerate tx x 20 mins.  Removed needles without adverse effects; Reviewed what to expect later with patient; discussed importance of hydration.     Provided moist heat to lumbar spine in supine with hips/knee at 90/90 x 15 mins.     Patient Education and Home Exercises       Education provided:   -What to expect following dry needling   - Updated HEP   - POC, HEP    Written Home Exercises Provided: Pt instructed to continue prior HEP. Exercises were reviewed and Yaneth was able to demonstrate them prior to the end of the session.  Yaneth demonstrated good  understanding of the education provided. See Electronic Medical Record under " Patient Instructions for exercises provided during therapy sessions    Assessment     Yaneth is noting significant improvement in her pain levels with therapy - manual tx and dry needling have made a huge difference for her.  She has been compliant with her HEP,     Yaneth Is not progressing well towards her goals.   Patient prognosis is Excellent.     Patient will continue to benefit from skilled outpatient physical therapy to address the deficits listed in the problem list box on initial evaluation, provide pt/family education and to maximize pt's level of independence in the home and community environment.     Patient's spiritual, cultural and educational needs considered and pt agreeable to plan of care and goals.     Anticipated barriers to physical therapy: na    Goals:  Short Term Goals: 3 weeks   Pt will be compliant with HEP. > ongoing   Pt will improve quad strength by 1/2 grade to allow for strength to go up and down stairs.   Pt will improve sit <>  30 sec to at least 10 allow for increased functional mobility.     Long Term Goals: 6 weeks   Pt will be independent with HEP to allow for increased functional tasks.  Pt will improve FOTO by 10 % to demonstrate improvement in quality of life.  Pt will improve hip flexor strength by 1/2 grade to allow for normalized body mechanics.   Plan      Plan of care Certification: 7/10/2024 to 10/10/24.     Outpatient Physical Therapy 2 times weekly for 6 weeks to include the following interventions: Aquatic Therapy, Electrical Stimulation  , Gait Training, Manual Therapy, Moist Heat/ Ice, Neuromuscular Re-ed, Patient Education, Therapeutic Activities, Therapeutic Exercise, and Ultrasound.        Teresa Pabon, PT

## 2024-09-25 ENCOUNTER — CLINICAL SUPPORT (OUTPATIENT)
Dept: REHABILITATION | Facility: HOSPITAL | Age: 74
End: 2024-09-25
Payer: MEDICARE

## 2024-09-25 DIAGNOSIS — Z74.09 IMPAIRED FUNCTIONAL MOBILITY, BALANCE, GAIT, AND ENDURANCE: Primary | ICD-10-CM

## 2024-09-25 PROCEDURE — 97140 MANUAL THERAPY 1/> REGIONS: CPT | Mod: KX,PN

## 2024-09-25 PROCEDURE — 97014 ELECTRIC STIMULATION THERAPY: CPT | Mod: KX,PN

## 2024-09-25 PROCEDURE — 97110 THERAPEUTIC EXERCISES: CPT | Mod: KX,PN

## 2024-09-25 NOTE — PROGRESS NOTES
[facility]   Physical Therapy Treatment Note      Name: Yaneth DELUCA [facility] Number: [id number]    Therapy Diagnosis:   Encounter Diagnosis   Name Primary?    Impaired functional mobility, balance, gait, and endurance Yes     Physician: Jazzy Gallagher MD    Visit Date: 9/25/2024    Therapy Diagnosis:        Encounter Diagnoses   Name Primary?    SI (sacroiliac) joint dysfunction      Injury caused by bending, sequela      Impaired functional mobility, balance, gait, and endurance Yes        Physician: Jazzy Gallagher MD     Physician Orders: PT Eval and Treat   Medical Diagnosis from Referral: M53.3 (ICD-10-CM) - SI (sacroiliac) joint dysfunction X50.1XXS (ICD-10-CM) - Injury caused by bending, sequela  Evaluation Date: 7/10/2024  Authorization Period Expiration: 12/31/24  Plan of Care Expiration: 10/10/24  Progress Note Due: 10/10/24  Date of Surgery: none at this time  Visit # / Visits authorized: 10  / 12   FOTO: 1/ 3     Precautions: Standard and Fall      Time In:  9:30   Time Out:  10:45    Total Billable Time: 60 mins     PTA Visit #: 0/5       Subjective     Patient reports: my pain is down, I still get stiff when I sit too long, but overall really much better.   She was compliant with home exercise program.  Response to previous treatment: ok  Functional change: pain less, feels like movement is not as difficult     Pain: 2-3 /10  Location: bilateral back - Left > Right, into left buttock     Objective      Objective Measures updated at progress report unless specified.     Treatment     Yaneth received the treatments listed below:      therapeutic exercises to develop strength and endurance for  15   minutes including:  Bike 15 level 3   Nustep level 5 x 15 min  Standing Hip Flex/Abd/Ext x 15  SLR 10x  Mini squats 10x  Bridges 10x  Ball squeeze x 2 min  DKC w/ PB x 2 min  LTR w/ PB x 2 min  SKC x 10       neuromuscular re-education activities to improve: Balance and  "Coordination for 0  minutes. The following activities were included:  Posterior pelvic tilt - 5"h x 10   Seated H/S stretching with stool - 30 sec x 3   Seated Lumbar flexion - multi-directions with use of SB - use an office  chair at home.   Toe Taps on 6" step x 2 min  FSU on 6" step x 15  LSU on 6" step x 15  Calf stretch 3 x 30 sec  Hip add ball 2 min     See EMR under MEDIA for written consent provided. -      Palpation Assessment to determine the necessity for Functional Dry Needling  significant tenderness left side of lumbar spine - lumbar fascia; piriformis, quadratus, significant fascia tightness noted with reduction in lumbar/hip AROM.     Yaneth received the following manual therapy techniques x 35  mins : S/L on right - lumbar  L1 > L5/S1 segmental flexion with blocking of segment above, performed this on both sides; Prone - IASTM to lumbar fascia moving into left piriformis; Lamina release in lumbar spine; side bending of lumbar in prone;   Patient positioned with pillow support under pelvis, ankles and head/neck: LF-ES per protocol for lumbar pain including quadratus, piriformis utilizing 60 and 75 mm needles; clockwise winding for increased tissue grasp;  Intensity of stimulation adjusted to patient tolerance; fair rhythmical contraction of tissue noted; patient able to tolerate tx x 20 mins.  Removed needles without adverse effects; Reviewed what to expect later with patient; discussed importance of hydration.     Provided moist heat to lumbar spine in supine with hips/knee at 90/90 x 15 mins.     Patient Education and Home Exercises       Education provided:   -What to expect following dry needling   - Updated HEP   - POC, HEP    Written Home Exercises Provided: Pt instructed to continue prior HEP. Exercises were reviewed and Yaneth was able to demonstrate them prior to the end of the session.  Yaneth demonstrated good  understanding of the education provided. See Electronic Medical Record under " Patient Instructions for exercises provided during therapy sessions    Assessment     Yaneth is noting significant improvement in her pain levels with therapy - manual tx and dry needling have made a huge difference for her.  She has been compliant with her HEP,     Yaneth Is not progressing well towards her goals.   Patient prognosis is Excellent.     Patient will continue to benefit from skilled outpatient physical therapy to address the deficits listed in the problem list box on initial evaluation, provide pt/family education and to maximize pt's level of independence in the home and community environment.     Patient's spiritual, cultural and educational needs considered and pt agreeable to plan of care and goals.     Anticipated barriers to physical therapy: na    Goals:  Short Term Goals: 3 weeks   Pt will be compliant with HEP. > ongoing   Pt will improve quad strength by 1/2 grade to allow for strength to go up and down stairs.   Pt will improve sit <>  30 sec to at least 10 allow for increased functional mobility.     Long Term Goals: 6 weeks   Pt will be independent with HEP to allow for increased functional tasks.  Pt will improve FOTO by 10 % to demonstrate improvement in quality of life.  Pt will improve hip flexor strength by 1/2 grade to allow for normalized body mechanics.   Plan      Plan of care Certification: 7/10/2024 to 10/10/24.     Outpatient Physical Therapy 2 times weekly for 6 weeks to include the following interventions: Aquatic Therapy, Electrical Stimulation  , Gait Training, Manual Therapy, Moist Heat/ Ice, Neuromuscular Re-ed, Patient Education, Therapeutic Activities, Therapeutic Exercise, and Ultrasound.        Teresa Pabon, PT

## 2024-09-27 ENCOUNTER — CLINICAL SUPPORT (OUTPATIENT)
Dept: REHABILITATION | Facility: HOSPITAL | Age: 74
End: 2024-09-27
Payer: MEDICARE

## 2024-09-27 DIAGNOSIS — Z74.09 IMPAIRED FUNCTIONAL MOBILITY, BALANCE, GAIT, AND ENDURANCE: Primary | ICD-10-CM

## 2024-09-27 PROCEDURE — 97110 THERAPEUTIC EXERCISES: CPT | Mod: KX,PN

## 2024-09-27 PROCEDURE — 97140 MANUAL THERAPY 1/> REGIONS: CPT | Mod: KX,PN

## 2024-09-27 PROCEDURE — 97014 ELECTRIC STIMULATION THERAPY: CPT | Mod: KX,PN

## 2024-09-28 NOTE — PROGRESS NOTES
[facility]   Physical Therapy Treatment Note      Name: Yaneth DELUCA [facility] Number: [id number]    Therapy Diagnosis:   Encounter Diagnosis   Name Primary?    Impaired functional mobility, balance, gait, and endurance Yes       Physician: Jazzy Gallagher MD    Visit Date: 9/27/2024    Therapy Diagnosis:        Encounter Diagnoses   Name Primary?    SI (sacroiliac) joint dysfunction      Injury caused by bending, sequela      Impaired functional mobility, balance, gait, and endurance Yes        Physician: Jazzy Gallagher MD     Physician Orders: PT Eval and Treat   Medical Diagnosis from Referral: M53.3 (ICD-10-CM) - SI (sacroiliac) joint dysfunction X50.1XXS (ICD-10-CM) - Injury caused by bending, sequela  Evaluation Date: 7/10/2024  Authorization Period Expiration: 12/31/24  Plan of Care Expiration: 10/10/24  Progress Note Due: 10/10/24  Date of Surgery: none at this time  Visit # / Visits authorized: 11 / 12   FOTO: 1/ 3     Precautions: Standard and Fall      Time In:  2:45   Time Out:  3:50     Total Billable Time: 60 mins     PTA Visit #: 0/5       Subjective     Patient reports: I'm really hurting today, I don't know why, I haven't really done anything that I would think would bother me;  I did pick-up sticks in the yard so Nik could cut the grass, Have been packing to get ready for the drive to [place] tomorrow.   She was compliant with home exercise program.  Response to previous treatment:  good - felt ok when she left last visit   Functional change: having increased pain with all movements today; hypersensitivity lumbar fascia     Pain:  6-7 /10  Location: bilateral back - Left > Right, into left buttock     Objective      Objective Measures updated at progress report unless specified.     Treatment     Yaneth received the treatments listed below:      therapeutic exercises to develop strength and endurance for  15   minutes including:  Bike 15 level 3   Nustep level 5 x 15  "min  Standing Hip Flex/Abd/Ext x 15  SLR 10x  Mini squats 10x  Bridges 10x  Ball squeeze x 2 min  DKC w/ PB x 2 min  LTR w/ PB x 2 min  SKC x 10       neuromuscular re-education activities to improve: Balance and Coordination for 0  minutes. The following activities were included:  Posterior pelvic tilt - 5"h x 10   Seated H/S stretching with stool - 30 sec x 3   Seated Lumbar flexion - multi-directions with use of SB - use an office  chair at home.   Toe Taps on 6" step x 2 min  FSU on 6" step x 15  LSU on 6" step x 15  Calf stretch 3 x 30 sec  Hip add ball 2 min     See EMR under MEDIA for written consent provided. -      Palpation Assessment to determine the necessity for Functional Dry Needling  significant tenderness left side of lumbar spine - lumbar fascia; piriformis, quadratus, significant fascia tightness noted with reduction in lumbar/hip AROM.     Yaneth received the following manual therapy techniques x 35  mins : S/L on right - lumbar  L1 > L5/S1 segmental flexion with blocking of segment above, performed this on both sides; Prone - IASTM to lumbar fascia moving into left piriformis; Lamina release in lumbar spine; side bending of lumbar in prone;   Patient positioned with pillow support under pelvis, ankles and head/neck: LF-ES per protocol for lumbar pain including quadratus, piriformis utilizing all 75 mm needles for ability to reach deeper tissues; clockwise winding for increased tissue grasp;  Intensity of stimulation adjusted to patient tolerance; fair rhythmical contraction of tissue noted; patient able to tolerate tx x 25 mins.  Removed needles without adverse effects; Reviewed what to expect later with patient; discussed importance of hydration.     Patient to apply moist heat at home today.     Patient Education and Home Exercises       Education provided:   -What to expect following dry needling   - Updated HEP   - POC, HEP    Written Home Exercises Provided: Pt instructed to continue prior " HEP. Exercises were reviewed and Yaneth was able to demonstrate them prior to the end of the session.  Yaneth demonstrated good  understanding of the education provided. See Electronic Medical Record under Patient Instructions for exercises provided during therapy sessions    Assessment     Yaneth noting exacerbation in pain today prior to treatment; Discussed that this is normal, has been restricted in movement for years and this is all part of the improvement process; Responded well to manual tx and dry needling today.  Going on trip to [place] for the week to check on their house - long drive ahead - told her to get out and stretch often; Try and do some of her HEP while gone.  Will resume therapy upon her return.     Yaneth Is not progressing well towards her goals.   Patient prognosis is Excellent.     Patient will continue to benefit from skilled outpatient physical therapy to address the deficits listed in the problem list box on initial evaluation, provide pt/family education and to maximize pt's level of independence in the home and community environment.     Patient's spiritual, cultural and educational needs considered and pt agreeable to plan of care and goals.     Anticipated barriers to physical therapy: na    Goals:  Short Term Goals: 3 weeks   Pt will be compliant with HEP. > ongoing   Pt will improve quad strength by 1/2 grade to allow for strength to go up and down stairs.  > Ongoing   Pt will improve sit <>  30 sec to at least 10 allow for increased functional mobility. > ongoing      Long Term Goals: 6 weeks   Pt will be independent with HEP to allow for increased functional tasks. > Ongoing   Pt will improve FOTO by 10 % to demonstrate improvement in quality of life.  Pt will improve hip flexor strength by 1/2 grade to allow for normalized body mechanics.   Plan      Plan of care Certification: 7/10/2024 to 10/10/24.     Outpatient Physical Therapy 2 times weekly for 6 weeks to include the  following interventions: Aquatic Therapy, Electrical Stimulation  , Gait Training, Manual Therapy, Moist Heat/ Ice, Neuromuscular Re-ed, Patient Education, Therapeutic Activities, Therapeutic Exercise, and Ultrasound.        Teresa Pabon, PT

## 2024-10-17 ENCOUNTER — CLINICAL SUPPORT (OUTPATIENT)
Dept: REHABILITATION | Facility: HOSPITAL | Age: 74
End: 2024-10-17
Payer: MEDICARE

## 2024-10-17 DIAGNOSIS — Z74.09 IMPAIRED FUNCTIONAL MOBILITY, BALANCE, GAIT, AND ENDURANCE: Primary | ICD-10-CM

## 2024-10-17 PROCEDURE — 97140 MANUAL THERAPY 1/> REGIONS: CPT | Mod: KX,PN

## 2024-10-17 PROCEDURE — 97110 THERAPEUTIC EXERCISES: CPT | Mod: KX,PN

## 2024-10-17 PROCEDURE — 97014 ELECTRIC STIMULATION THERAPY: CPT | Mod: KX,PN

## 2024-10-17 NOTE — PROGRESS NOTES
[facility]   Physical Therapy Treatment Note      Name: Yaneth DELUCA [facility] Number: [id number]    Therapy Diagnosis:   Encounter Diagnosis   Name Primary?    Impaired functional mobility, balance, gait, and endurance Yes       Physician: Jazzy Gallagher MD    Visit Date: 10/17/2024    Therapy Diagnosis:        Encounter Diagnoses   Name Primary?    SI (sacroiliac) joint dysfunction      Injury caused by bending, sequela      Impaired functional mobility, balance, gait, and endurance Yes        Physician: Jazzy Gallagher MD     Physician Orders: PT Eval and Treat   Medical Diagnosis from Referral: M53.3 (ICD-10-CM) - SI (sacroiliac) joint dysfunction X50.1XXS (ICD-10-CM) - Injury caused by bending, sequela  Evaluation Date: 7/10/2024  Authorization Period Expiration: 12/31/24  Plan of Care Expiration: 10/10/24 - update POC  new expiration date 12/31/2024   Progress Note Due:   Date of Surgery: none at this time  Visit # / Visits authorized: 12 / 12   FOTO: 2/ 3     Precautions: Standard and Fall      Time In:  11:15 am   Time Out:   12:25     Total Billable Time:  45 mins     PTA Visit #: 0/5       Subjective     Patient reports: I just hurt the entire time I was gone; Riding in the car for the amount of time we did just wasn't good for me.   She was compliant with home exercise program.  Response to previous treatment:  hasn't been here in about 3 weeks - trip to [place]   Functional change: riding in the truck was not good for me, I hurt the entire time I was gone     Pain:  4-5 /10  Location: bilateral back - Left > Right, into left buttock     Objective      Objective Measures updated at progress report unless specified.     Treatment     Yaneth received the treatments listed below:      therapeutic exercises to develop strength and endurance for  15   minutes including:  Bike 15 level 3   Nustep level 5 x 15 min  Standing Hip Flex/Abd/Ext x 15  SLR 10x  Mini squats 10x  Bridges  "10x  Ball squeeze x 2 min  DKC w/ PB x 2 min  LTR w/ PB x 2 min  SKC x 10       neuromuscular re-education activities to improve: Balance and Coordination for 0  minutes. The following activities were included:  Posterior pelvic tilt - 5"h x 10   Seated H/S stretching with stool - 30 sec x 3   Seated Lumbar flexion - multi-directions with use of SB - use an office  chair at home.   Toe Taps on 6" step x 2 min  FSU on 6" step x 15  LSU on 6" step x 15  Calf stretch 3 x 30 sec  Hip add ball 2 min     See EMR under MEDIA for written consent provided. -      Palpation Assessment to determine the necessity for Functional Dry Needling  significant tenderness left side of lumbar spine - lumbar fascia; piriformis, quadratus, significant fascia tightness noted with reduction in lumbar/hip AROM.     Yaneth received the following manual therapy techniques x 35  mins : S/L on right - lumbar  L1 > L5/S1 segmental flexion with blocking of segment above, performed this on both sides; S/L:  IASTM to lumbar fascia moving into left piriformis;   Patient positioned in S/L on her right side; pillows between knees: LF-ES per protocol for lumbar pain including quadratus, piriformis utilizing all 75 mm needles for ability to reach deeper tissues; clockwise winding for increased tissue grasp;  Intensity of stimulation adjusted to patient tolerance; fair rhythmical contraction of tissue noted; patient able to tolerate tx x 25 mins.  Removed needles without adverse effects; Reviewed what to expect later with patient; discussed importance of hydration.     Patient received moist heat to lumbar spine in supine with hips/knees in 90/90.     Patient Education and Home Exercises       Education provided:   -What to expect following dry needling   - Trial of aquatic exercise for increasing activity with less pain     Written Home Exercises Provided: Pt instructed to continue prior HEP. Exercises were reviewed and Yaneth was able to demonstrate them " prior to the end of the session.  Yaneth demonstrated good  understanding of the education provided. See Electronic Medical Record under Patient Instructions for exercises provided during therapy sessions    Assessment     Yaneth noting increased left sided lumbar pain today - returned from 3 week trip to [place] - a lot of riding in the car - hard on her back; Good response to manual tx and dry needling today; Will start aquatic therapeutic exercises next visit - needs to increase her activity and this would be good way to start while reducing her pain with buoyancy of water to assist.     Yaneth Is not progressing well towards her goals.   Patient prognosis is Excellent.     Patient will continue to benefit from skilled outpatient physical therapy to address the deficits listed in the problem list box on initial evaluation, provide pt/family education and to maximize pt's level of independence in the home and community environment.     Patient's spiritual, cultural and educational needs considered and pt agreeable to plan of care and goals.     Anticipated barriers to physical therapy: na    Goals:  Short Term Goals: 3 weeks   Pt will be compliant with HEP. > ongoing   Pt will improve quad strength by 1/2 grade to allow for strength to go up and down stairs.  > Ongoing   Pt will improve sit <>  30 sec to at least 10 allow for increased functional mobility. > ongoing      Long Term Goals: 6 weeks   Pt will be independent with HEP to allow for increased functional tasks. > Ongoing   Pt will improve FOTO by 10 % to demonstrate improvement in quality of life.  Pt will improve hip flexor strength by 1/2 grade to allow for normalized body mechanics.   Plan      Plan of care Certification: 10/10/24. > 12/31/2024      Outpatient Physical Therapy 2 times weekly for 6 weeks to include the following interventions: Aquatic Therapy, Electrical Stimulation  , Gait Training, Manual Therapy, Moist Heat/ Ice, Neuromuscular Re-ed,  Patient Education, Therapeutic Activities, Therapeutic Exercise, and Ultrasound.        Teresa Pabon, PT

## 2024-10-19 NOTE — PLAN OF CARE
[facility]  Physical Therapy Plan of Care Note     Name: Yaneth Vera  Clinic Number: [id number]    Therapy Diagnosis:   Encounter Diagnosis   Name Primary?    Impaired functional mobility, balance, gait, and endurance Yes     Physician: Jazzy Gallagher MD    Visit Date: 10/17/2024    Physician Orders: Eval and Treat   Medical Diagnosis from Referral:    M53.3 (ICD-10-CM) - SI (sacroiliac) joint dysfunction X50.1XXS (ICD-10-CM) - Injury caused by bending, sequela   Evaluation Date: 7/10/2024  Authorization Period Expiration: 12/31/2024   Plan of Care Expiration: 10/10/14  Progress Note Due: 10/17/2024   Visit # / Visits authorized: 12/ 12  FOTO: 2/3    Precautions: Standard and Fall  Functional Level Prior to Evaluation:    Independent with reduced activity tolerance as result of pain with standing , walking activities.     Subjective     Update: Lower back pain around 4-5/10 with daily activities, Max pain can be 8-10/10. Just returned from long driving trip - car travel was difficult for her from a pain and mobility standpoint.     Objective      Update:     Lumbar AROM: restricted in all planes secondary to fascial tightness in lower back, hamstring tightness, what motion she does have is accomplished from hinging at the T/L junction.  Poor ability to reverse her lumbar spine curve at all.     Lower Extremity Strength    Right LE Left LE   Knee extension: 4-/5 > 4/5  4/5   Knee flexion: 4/5 4/5   Hip flexion: 4-/5 4-/5   Hip extension:  3+/5 3+/5   Hip abduction: 3+/5 3+/5   Hip adduction: 4-/5 4-/5   Ankle dorsiflexion: 4+/5 4+/5   Ankle plantarflexion: 4+/5 4+/5      Palpation: minimal tenderness to palpation at at hip region on B sides more severe on left      Sensation: normal at this time.      Flexibility:                90/90 SLR = R moderate restriction, L moderate  restriction      Assessment     Update:   Yaneth luu returned from long driving trip to [place] over the course of 3  weeks.  In spite of this, she is ambulating with less antalgia; has pain that has centralized to her lumbar spine -left side along posterior ilium - quadratus mm;  She demonstrates long standing severe myofascial tension in her lumbar spine and hip/gluteal mm that has begun to soften with therapy over the past month.  Patient has been consistent with HEP, consistent with therapy appointments and is motivated to improve her function.  Will add in aquatic exercises to improve her level of function without increased pain from gravitational effects on her spine.     Previous Short Term Goals Status:   continues to progress toward achievement of these goals     Long Term Goal Status: continue per initial plan of care.  Reasons for Recertification of Therapy:   motivated and is demonstrating improvement since initiating dry needling - will start aquatic exercise as well.     GOALS  Short Term Goals: 3 weeks   Pt will be compliant with HEP. > ongoing   Pt will improve quad strength by 1/2 grade to allow for strength to go up and down stairs.  > Ongoing   Pt will improve sit <>  30 sec to at least 10 allow for increased functional mobility. > ongoing      Long Term Goals: 6 weeks   Pt will be independent with HEP to allow for increased functional tasks. > Ongoing   Pt will improve FOTO by 10 % to demonstrate improvement in quality of life.  Pt will improve hip flexor strength by 1/2 grade to allow for normalized body mechanics.     Plan     Updated Certification Period: 10/14/2024 to 12/31/2024   Recommended Treatment Plan: 2 times per week for 6 weeks:  Aquatic Therapy, Manual Therapy, Neuromuscular Re-ed, Patient Education, Therapeutic Activities, and Therapeutic Exercise  Other Recommendations: n/a     Teresa Pabon, PT

## 2024-10-22 ENCOUNTER — CLINICAL SUPPORT (OUTPATIENT)
Dept: REHABILITATION | Facility: HOSPITAL | Age: 74
End: 2024-10-22
Payer: MEDICARE

## 2024-10-22 DIAGNOSIS — Z74.09 IMPAIRED FUNCTIONAL MOBILITY, BALANCE, GAIT, AND ENDURANCE: Primary | ICD-10-CM

## 2024-10-22 PROCEDURE — 97140 MANUAL THERAPY 1/> REGIONS: CPT | Mod: PN

## 2024-10-22 PROCEDURE — 97014 ELECTRIC STIMULATION THERAPY: CPT | Mod: PN

## 2024-10-22 PROCEDURE — 97110 THERAPEUTIC EXERCISES: CPT | Mod: PN

## 2024-10-22 NOTE — PROGRESS NOTES
[facility]   Physical Therapy Treatment Note      Name: Yaneth DELUCA [facility] Number: [id number]    Therapy Diagnosis:   Encounter Diagnosis   Name Primary?    Impaired functional mobility, balance, gait, and endurance Yes       Physician: Jazzy Gallagher MD    Visit Date: 10/22/2024    Therapy Diagnosis:        Encounter Diagnoses   Name Primary?    SI (sacroiliac) joint dysfunction      Injury caused by bending, sequela      Impaired functional mobility, balance, gait, and endurance Yes        Physician: Jazzy Gallagher MD     Physician Orders: PT Eval and Treat   Medical Diagnosis from Referral: M53.3 (ICD-10-CM) - SI (sacroiliac) joint dysfunction X50.1XXS (ICD-10-CM) - Injury caused by bending, sequela  Evaluation Date: 7/10/2024  Authorization Period Expiration: 12/31/24  Plan of Care Expiration: 10/10/24 - update POC  new expiration date 12/31/2024   Progress Note Due:   Date of Surgery: none at this time  Visit # / Visits authorized: 13   FOTO: 2/ 3     Precautions: Standard and Fall      Time In:  9:30  am   Time Out:  10:40 am     Total Billable Time:  45 mins     PTA Visit #: 0/5       Subjective     Patient reports: I'm doing better than I was last week; Still having pain in the same place, but it's less.   She was compliant with home exercise program.  Response to previous treatment:  improved pain levels,   Functional change: still limited mobility,     Pain:  4 /10  Location: bilateral back - Left > Right, into left buttock     Objective      Objective Measures updated at progress report unless specified.     Treatment     Yaneth received the treatments listed below:      therapeutic exercises to develop strength and endurance for  15   minutes including:  Bike 15 level 3   Nustep level 5 x 15 min  Standing Hip Flex/Abd/Ext x 15  SLR 10x  Mini squats 10x  Bridges 10x  Ball squeeze x 2 min  DKC w/ PB x 2 min  LTR w/ PB x 2 min  SKC x 10       neuromuscular re-education  "activities to improve: Balance and Coordination for 0  minutes. The following activities were included:  Posterior pelvic tilt - 5"h x 10   Seated H/S stretching with stool - 30 sec x 3   Seated Lumbar flexion - multi-directions with use of SB - use an office  chair at home.   Toe Taps on 6" step x 2 min  FSU on 6" step x 15  LSU on 6" step x 15  Calf stretch 3 x 30 sec  Hip add ball 2 min     See EMR under MEDIA for written consent provided. -      Palpation Assessment to determine the necessity for Functional Dry Needling  significant tenderness left side of lumbar spine - lumbar fascia; piriformis, quadratus, significant fascia tightness noted with reduction in lumbar/hip AROM.     Yaneth received the following manual therapy techniques x 35  mins : S/L on right - lumbar  L1 > L5/S1 segmental flexion with blocking of segment above, performed this on both sides; S/L:  IASTM to lumbar fascia moving into left piriformis;   Patient positioned in S/L on her right side; pillows between knees: LF-ES per protocol for lumbar pain including quadratus, piriformis utilizing all 75 mm needles for ability to reach deeper tissues; clockwise winding for increased tissue grasp;  Intensity of stimulation adjusted to patient tolerance; fair rhythmical contraction of tissue noted; patient able to tolerate tx x 25 mins.  Removed needles without adverse effects; Reviewed what to expect later with patient; discussed importance of hydration.     Patient received moist heat to lumbar spine in supine with hips/knees in 90/90.     Patient Education and Home Exercises       Education provided:   -What to expect following dry needling   - Trial of aquatic exercise for increasing activity with less pain     Written Home Exercises Provided: Pt instructed to continue prior HEP. Exercises were reviewed and Yaneth was able to demonstrate them prior to the end of the session.  Yaneth demonstrated good  understanding of the education provided. See " Electronic Medical Record under Patient Instructions for exercises provided during therapy sessions    Assessment     Yaneth noting reduction in her pain today from last week, but continues with limited mobility and significant tissue tension throughout her lumbar spine and hips.  Will start aquatic exercise program tomorrow .     Yaneth Is not progressing well towards her goals.   Patient prognosis is Excellent.     Patient will continue to benefit from skilled outpatient physical therapy to address the deficits listed in the problem list box on initial evaluation, provide pt/family education and to maximize pt's level of independence in the home and community environment.     Patient's spiritual, cultural and educational needs considered and pt agreeable to plan of care and goals.     Anticipated barriers to physical therapy: na    Goals:  Short Term Goals: 3 weeks   Pt will be compliant with HEP. > ongoing   Pt will improve quad strength by 1/2 grade to allow for strength to go up and down stairs.  > Ongoing   Pt will improve sit <>  30 sec to at least 10 allow for increased functional mobility. > ongoing      Long Term Goals: 6 weeks   Pt will be independent with HEP to allow for increased functional tasks. > Ongoing   Pt will improve FOTO by 10 % to demonstrate improvement in quality of life.  Pt will improve hip flexor strength by 1/2 grade to allow for normalized body mechanics.   Plan      Plan of care Certification: 10/10/24. > 12/31/2024      Outpatient Physical Therapy 2 times weekly for 6 weeks to include the following interventions: Aquatic Therapy, Electrical Stimulation  , Gait Training, Manual Therapy, Moist Heat/ Ice, Neuromuscular Re-ed, Patient Education, Therapeutic Activities, Therapeutic Exercise, and Ultrasound.        Teresa Pabon, PT

## 2024-10-23 ENCOUNTER — CLINICAL SUPPORT (OUTPATIENT)
Dept: REHABILITATION | Facility: HOSPITAL | Age: 74
End: 2024-10-23
Payer: MEDICARE

## 2024-10-23 DIAGNOSIS — Z74.09 IMPAIRED FUNCTIONAL MOBILITY, BALANCE, GAIT, AND ENDURANCE: Primary | ICD-10-CM

## 2024-10-23 PROCEDURE — 97113 AQUATIC THERAPY/EXERCISES: CPT | Mod: PN

## 2024-10-23 NOTE — PROGRESS NOTES
[facility]   Physical Therapy Treatment Note      Name: Yaneth DELUCA [facility] Number: [id number]    Therapy Diagnosis:   Encounter Diagnosis   Name Primary?    Impaired functional mobility, balance, gait, and endurance Yes       Physician: Jazzy Gallagher MD    Visit Date: 10/23/2024    Therapy Diagnosis:        Encounter Diagnoses   Name Primary?    SI (sacroiliac) joint dysfunction      Injury caused by bending, sequela      Impaired functional mobility, balance, gait, and endurance Yes        Physician: Jazzy Gallagher MD     Physician Orders: PT Eval and Treat   Medical Diagnosis from Referral: M53.3 (ICD-10-CM) - SI (sacroiliac) joint dysfunction X50.1XXS (ICD-10-CM) - Injury caused by bending, sequela  Evaluation Date: 7/10/2024  Authorization Period Expiration: 12/31/24  Plan of Care Expiration: 10/10/24 - update POC  new expiration date 12/31/2024   Progress Note Due:   Date of Surgery: none at this time  Visit # / Visits authorized: 14   FOTO: 2/ 3     Precautions: Standard and Fall      Time In:  10:30   am   Time Out:  11:15  am     Total Billable Time:  45 mins     PTA Visit #: 0/5       Subjective     Patient reports:  pain is better than last visit;   She was compliant with home exercise program.  Response to previous treatment:  improved pain levels,   Functional change: still limited mobility,     Pain:  4 /10  Location: bilateral back - Left > Right, into left buttock     Objective      Objective Measures updated at progress report unless specified.     Treatment     Yaneth received the treatments listed below:      Aquatic therapeutic Exercises to develop strength and endurance using buoyancy of water to relieve pain in lumbar/LE's:  x 40 min   Forward Walking x 5 mins   Backwards walking x 5 mins   Side-stepping - wide x 5 mins  Hold onto bar - backward lean for lumbar flexion stretch - hold x 30+ secs and repeat x 6   Squats x 15   Hip abduction x 20  Hip flexion x  "20  Hip Extension x 20   Marching x 20  Hip flex<>ER and back to stand x 20   UE with weights:  x 15 each   Shoulder ER  Shoulder abduction   Shoulder extension     Lateral trunk stretching x 4 each side   Rotational stretch - arms reaching out in front x 4 each side   H/S stretch 30 sec x 3 each  Achilles stretch - 30 sec x 3  Heel raises x 20         therapeutic exercises to develop strength and endurance for     minutes including:  Bike 15 level 3   Nustep level 5 x 15 min  Standing Hip Flex/Abd/Ext x 15  SLR 10x  Mini squats 10x  Bridges 10x  Ball squeeze x 2 min  DKC w/ PB x 2 min  LTR w/ PB x 2 min  SKC x 10       neuromuscular re-education activities to improve: Balance and Coordination for 0  minutes. The following activities were included:  Posterior pelvic tilt - 5"h x 10   Seated H/S stretching with stool - 30 sec x 3   Seated Lumbar flexion - multi-directions with use of SB - use an office  chair at home.   Toe Taps on 6" step x 2 min  FSU on 6" step x 15  LSU on 6" step x 15  Calf stretch 3 x 30 sec  Hip add ball 2 min          Palpation Assessment to determine the necessity for Functional Dry Needling  significant tenderness left side of lumbar spine - lumbar fascia; piriformis, quadratus, significant fascia tightness noted with reduction in lumbar/hip AROM.     Yaneth received the following manual therapy techniques x 0  mins : S/L on right - lumbar  L1 > L5/S1 segmental flexion with blocking of segment above, performed this on both sides; S/L:  IASTM to lumbar fascia moving into left piriformis;   Patient positioned in S/L on her right side; pillows between knees: LF-ES per protocol for lumbar pain including quadratus, piriformis utilizing all 75 mm needles for ability to reach deeper tissues; clockwise winding for increased tissue grasp;  Intensity of stimulation adjusted to patient tolerance; fair rhythmical contraction of tissue noted; patient able to tolerate tx x 25 mins.  Removed needles without " adverse effects; Reviewed what to expect later with patient; discussed importance of hydration.     Patient received moist heat to lumbar spine in supine with hips/knees in 90/90.     Patient Education and Home Exercises       Education provided:   -What to expect following dry needling   - Trial of aquatic exercise for increasing activity with less pain     Written Home Exercises Provided: Pt instructed to continue prior HEP. Exercises were reviewed and Yaneth was able to demonstrate them prior to the end of the session.  Yaneth demonstrated good  understanding of the education provided. See Electronic Medical Record under Patient Instructions for exercises provided during therapy sessions    Assessment     Yaneth noting reduction in her pain today from last week, but continues with limited mobility and significant tissue tension throughout her lumbar spine and hips.  Aquatic exercises today with good tolerance, and able to manage balance in water without need for heavy UE assist;  Patient noting no pain while in water.      Yaneth Is not progressing well towards her goals.   Patient prognosis is Excellent.     Patient will continue to benefit from skilled outpatient physical therapy to address the deficits listed in the problem list box on initial evaluation, provide pt/family education and to maximize pt's level of independence in the home and community environment.     Patient's spiritual, cultural and educational needs considered and pt agreeable to plan of care and goals.     Anticipated barriers to physical therapy: na    Goals:  Short Term Goals: 3 weeks   Pt will be compliant with HEP. > ongoing   Pt will improve quad strength by 1/2 grade to allow for strength to go up and down stairs.  > Ongoing   Pt will improve sit <>  30 sec to at least 10 allow for increased functional mobility. > ongoing      Long Term Goals: 6 weeks   Pt will be independent with HEP to allow for increased functional tasks. >  Ongoing   Pt will improve FOTO by 10 % to demonstrate improvement in quality of life.  Pt will improve hip flexor strength by 1/2 grade to allow for normalized body mechanics.   Plan      Plan of care Certification: 10/10/24. > 12/31/2024      Outpatient Physical Therapy 2 times weekly for 6 weeks to include the following interventions: Aquatic Therapy, Electrical Stimulation  , Gait Training, Manual Therapy, Moist Heat/ Ice, Neuromuscular Re-ed, Patient Education, Therapeutic Activities, Therapeutic Exercise, and Ultrasound.        Teresa Pabon, PT

## 2024-10-29 ENCOUNTER — CLINICAL SUPPORT (OUTPATIENT)
Dept: REHABILITATION | Facility: HOSPITAL | Age: 74
End: 2024-10-29
Payer: MEDICARE

## 2024-10-29 DIAGNOSIS — Z74.09 IMPAIRED FUNCTIONAL MOBILITY, BALANCE, GAIT, AND ENDURANCE: Primary | ICD-10-CM

## 2024-10-29 PROCEDURE — 97113 AQUATIC THERAPY/EXERCISES: CPT | Mod: PN

## 2024-11-01 ENCOUNTER — CLINICAL SUPPORT (OUTPATIENT)
Dept: REHABILITATION | Facility: HOSPITAL | Age: 74
End: 2024-11-01
Payer: MEDICARE

## 2024-11-01 DIAGNOSIS — Z74.09 IMPAIRED FUNCTIONAL MOBILITY, BALANCE, GAIT, AND ENDURANCE: Primary | ICD-10-CM

## 2024-11-01 PROCEDURE — 97140 MANUAL THERAPY 1/> REGIONS: CPT | Mod: KX,PN

## 2024-11-01 PROCEDURE — 97110 THERAPEUTIC EXERCISES: CPT | Mod: KX,PN

## 2024-11-01 PROCEDURE — 97014 ELECTRIC STIMULATION THERAPY: CPT | Mod: KX,PN

## 2024-11-06 ENCOUNTER — CLINICAL SUPPORT (OUTPATIENT)
Dept: REHABILITATION | Facility: HOSPITAL | Age: 74
End: 2024-11-06
Payer: MEDICARE

## 2024-11-06 DIAGNOSIS — Z74.09 IMPAIRED FUNCTIONAL MOBILITY, BALANCE, GAIT, AND ENDURANCE: Primary | ICD-10-CM

## 2024-11-06 PROCEDURE — 97140 MANUAL THERAPY 1/> REGIONS: CPT | Mod: KX,PN

## 2024-11-06 PROCEDURE — 97110 THERAPEUTIC EXERCISES: CPT | Mod: KX,PN

## 2024-11-06 PROCEDURE — 97032 APPL MODALITY 1+ESTIM EA 15: CPT | Mod: KX,PN

## 2024-11-06 NOTE — PROGRESS NOTES
[facility]   Physical Therapy Treatment Note      Name: Yaneth DELUCA [facility] Number: [id number]    Therapy Diagnosis:   Encounter Diagnosis   Name Primary?    Impaired functional mobility, balance, gait, and endurance Yes       Physician: Jazzy Gallagher MD    Visit Date: 11/6/2024    Therapy Diagnosis:        Encounter Diagnoses   Name Primary?    SI (sacroiliac) joint dysfunction      Injury caused by bending, sequela      Impaired functional mobility, balance, gait, and endurance Yes        Physician: Jazzy Gallagher MD     Physician Orders: PT Eval and Treat   Medical Diagnosis from Referral: M53.3 (ICD-10-CM) - SI (sacroiliac) joint dysfunction X50.1XXS (ICD-10-CM) - Injury caused by bending, sequela  Evaluation Date: 7/10/2024  Authorization Period Expiration: 12/31/24  Plan of Care Expiration:  12/31/2024   Progress Note Due:   Date of Surgery: none at this time  Visit # / Visits authorized: 17  FOTO: 2/ 3     Precautions: Standard and Fall      Time In:  2:00 pm   Time Out: 3:00 pm     Total Billable Time:  55 mins     PTA Visit #: 0/5       Subjective     Patient reports:  Made an appointment with Dr. Norman -  available in January.  Has to get copies of her records from her previous visits with him back in [id number].   She was compliant with home exercise program.  Response to previous treatment:  improved pain levels,   Functional change: still limited mobility,     Pain:  4 /10  Location: bilateral back - Left > Right, into left buttock     Objective      Objective Measures updated at progress report unless specified.     Treatment     Yaneth received the treatments listed below:      Aquatic therapeutic Exercises to develop strength and endurance using buoyancy of water to relieve pain in lumbar/LE's:  x 0 min   Forward Walking x 5 mins   Backwards walking x 5 mins   Side-stepping - wide x 5 mins  Hold onto bar - backward lean for lumbar flexion stretch - hold x 30+ secs  "and repeat x 6   Squats x 15   Hip abduction x 20  Hip flexion x 20  Hip Extension x 20   Marching x 20  Hip flex<>ER and back to stand x 20   UE with weights:  x 15 each   Shoulder ER  Shoulder abduction   Shoulder extension     Lateral trunk stretching x 4 each side   Rotational stretch - arms reaching out in front x 4 each side   H/S stretch 30 sec x 3 each  Achilles stretch - 30 sec x 3  Heel raises x 20         therapeutic exercises to develop strength and endurance for   15  minutes including:  Bike 15 level 3   Nustep level 5 x 20 min  Standing Hip Flex/Abd/Ext x 15  SLR 10x  Mini squats 10x  Bridges 10x  Ball squeeze x 2 min  DKC w/ PB x 2 min  LTR w/ PB x 2 min  SKC x 10       neuromuscular re-education activities to improve: Balance and Coordination for 0  minutes. The following activities were included:  Posterior pelvic tilt - 5"h x 10   Seated H/S stretching with stool - 30 sec x 3   Seated Lumbar flexion - multi-directions with use of SB - use an office  chair at home.   Toe Taps on 6" step x 2 min  FSU on 6" step x 15  LSU on 6" step x 15  Calf stretch 3 x 30 sec  Hip add ball 2 min          Palpation Assessment to determine the necessity for Functional Dry Needling  significant tenderness left side of lumbar spine - lumbar fascia; piriformis, quadratus, significant fascia tightness noted with reduction in lumbar/hip AROM.     Yaneth received the following manual therapy techniques x 30  mins : S/L on right - lumbar  L1 > L5/S1 segmental flexion with blocking of segment above, performed this on both sides; S/L:  IASTM to lumbar fascia moving into left piriformis;   Patient positioned in S/L on her right side; pillows between knees: LF-ES per protocol for lumbar pain including quadratus, piriformis utilizing all 75 mm needles for ability to reach deeper tissues; clockwise winding for increased tissue grasp;  Intensity of stimulation adjusted to patient tolerance; fair rhythmical contraction of tissue " noted; patient able to tolerate tx x 25 mins.  Removed needles without adverse effects; Reviewed what to expect later with patient; discussed importance of hydration.         Patient Education and Home Exercises       Education provided:   - importance of staying active     Written Home Exercises Provided: Pt instructed to continue prior HEP. Exercises were reviewed and Yaneth was able to demonstrate them prior to the end of the session.  Yaneth demonstrated good  understanding of the education provided. See Electronic Medical Record under Patient Instructions for exercises provided during therapy sessions    Assessment     Yaneth noting static pain numbers at this point; continues to have pain left SI jt area with myofascial tissue restrictions throughout her lumbar spine into gluteals/hips.  She has noted some benefits from therapy with dry needling and manual interventions as well as exercise in aquatic environment.  Recommended she consider pain management appointment for another level of therapy to address her pain - has an appointment in January with Dr. Norman.      Yaneth Is  progressing slowly towards her goals.   Patient prognosis is Excellent.     Patient will continue to benefit from skilled outpatient physical therapy to address the deficits listed in the problem list box on initial evaluation, provide pt/family education and to maximize pt's level of independence in the home and community environment.     Patient's spiritual, cultural and educational needs considered and pt agreeable to plan of care and goals.     Anticipated barriers to physical therapy: na    Goals:  Short Term Goals: 3 weeks   Pt will be compliant with HEP. > ongoing   Pt will improve quad strength by 1/2 grade to allow for strength to go up and down stairs.  > Ongoing   Pt will improve sit <>  30 sec to at least 10 allow for increased functional mobility. > ongoing      Long Term Goals: 6 weeks   Pt will be independent with HEP  to allow for increased functional tasks. > Ongoing   Pt will improve FOTO by 10 % to demonstrate improvement in quality of life.  Pt will improve hip flexor strength by 1/2 grade to allow for normalized body mechanics.   Plan      Plan of care Certification: 10/10/24. > 12/31/2024      Outpatient Physical Therapy 2 times weekly for 6 weeks to include the following interventions: Aquatic Therapy, Electrical Stimulation  , Gait Training, Manual Therapy, Moist Heat/ Ice, Neuromuscular Re-ed, Patient Education, Therapeutic Activities, Therapeutic Exercise, and Ultrasound.        Teresa Pabon, PT

## 2024-11-13 ENCOUNTER — CLINICAL SUPPORT (OUTPATIENT)
Dept: REHABILITATION | Facility: HOSPITAL | Age: 74
End: 2024-11-13
Payer: MEDICARE

## 2024-11-13 DIAGNOSIS — Z74.09 IMPAIRED FUNCTIONAL MOBILITY, BALANCE, GAIT, AND ENDURANCE: Primary | ICD-10-CM

## 2024-11-13 PROCEDURE — 97112 NEUROMUSCULAR REEDUCATION: CPT | Mod: KX,PN

## 2024-11-13 PROCEDURE — 97014 ELECTRIC STIMULATION THERAPY: CPT | Mod: KX,PN

## 2024-11-13 PROCEDURE — 97140 MANUAL THERAPY 1/> REGIONS: CPT | Mod: KX,PN

## 2024-11-14 NOTE — PROGRESS NOTES
[facility]   Physical Therapy Treatment Note      Name: Yaneth DELUCA [facility] Number: [id number]    Therapy Diagnosis:   Encounter Diagnosis   Name Primary?    Impaired functional mobility, balance, gait, and endurance Yes       Physician: Jazzy Gallagher MD    Visit Date: 11/13/2024    Therapy Diagnosis:        Encounter Diagnoses   Name Primary?    SI (sacroiliac) joint dysfunction      Injury caused by bending, sequela      Impaired functional mobility, balance, gait, and endurance Yes        Physician: Jazzy Gallagher MD     Physician Orders: PT Eval and Treat   Medical Diagnosis from Referral: M53.3 (ICD-10-CM) - SI (sacroiliac) joint dysfunction X50.1XXS (ICD-10-CM) - Injury caused by bending, sequela  Evaluation Date: 7/10/2024  Authorization Period Expiration: 12/31/24  Plan of Care Expiration:  12/31/2024   Progress Note Due:   Date of Surgery: none at this time  Visit # / Visits authorized: 18  FOTO: 2/ 3     Precautions: Standard and Fall      Time In:  11:15 am   Time Out: 12:15 am      Total Billable Time:  45 mins     PTA Visit #: 0/5       Subjective     Patient reports:   I had to miss the pool appointment last week because we had multiple appointments in [place]; I think that's why I'm stiff/hurting today. Lower back pain, right side, but also along paraspinals   She was compliant with home exercise program.  Response to previous treatment:  improved pain levels,   Functional change: still limited mobility,     Pain:  4 /10  Location: bilateral back - Left > Right, into left buttock     Objective      Objective Measures updated at progress report unless specified.     Treatment     Yaneth received the treatments listed below:      Aquatic therapeutic Exercises to develop strength and endurance using buoyancy of water to relieve pain in lumbar/LE's:  x 0 min   Forward Walking x 5 mins   Backwards walking x 5 mins   Side-stepping - wide x 5 mins  Hold onto bar - backward  "lean for lumbar flexion stretch - hold x 30+ secs and repeat x 6   Squats x 15   Hip abduction x 20  Hip flexion x 20  Hip Extension x 20   Marching x 20  Hip flex<>ER and back to stand x 20   UE with weights:  x 15 each   Shoulder ER  Shoulder abduction   Shoulder extension     Lateral trunk stretching x 4 each side   Rotational stretch - arms reaching out in front x 4 each side   H/S stretch 30 sec x 3 each  Achilles stretch - 30 sec x 3  Heel raises x 20         therapeutic exercises to develop strength and endurance for   15  minutes including:  Bike 15 level 3   Nustep level 5 x 20 min  Standing Hip Flex/Abd/Ext x 15  SLR 10x  Mini squats 10x  Bridges 10x  Ball squeeze x 2 min  DKC w/ PB x 2 min  LTR w/ PB x 2 min  SKC x 10       neuromuscular re-education activities to improve: Balance and Coordination for 0  minutes. The following activities were included:  Posterior pelvic tilt - 5"h x 10   Seated H/S stretching with stool - 30 sec x 3   Seated Lumbar flexion - multi-directions with use of SB - use an office  chair at home.   Toe Taps on 6" step x 2 min  FSU on 6" step x 15  LSU on 6" step x 15  Calf stretch 3 x 30 sec  Hip add ball 2 min          Palpation Assessment to determine the necessity for Functional Dry Needling  significant tenderness left side of lumbar spine - lumbar fascia; piriformis, quadratus, significant fascia tightness noted with reduction in lumbar/hip AROM.     Yaneth received the following manual therapy techniques x 30  mins : S/L on right - lumbar  L1 > L5/S1 segmental flexion with blocking of segment above, performed this on both sides; S/L: Prone:  IASTM to lumbar fascia - to address tissue restrictions.    Patient positioned in Prone: LF-ES per protocol for lumbar pain including quadratus, piriformis utilizing all 75 mm needles for ability to reach deeper tissues; clockwise winding for increased tissue grasp;  Intensity of stimulation adjusted to patient tolerance; fair rhythmical " contraction of tissue noted; patient able to tolerate tx x 20 mins.  Removed needles without adverse effects; Reviewed what to expect later with patient; discussed importance of hydration.         Patient Education and Home Exercises       Education provided:   - importance of staying active     Written Home Exercises Provided: Pt instructed to continue prior HEP. Exercises were reviewed and Yaneth was able to demonstrate them prior to the end of the session.  Yaneth demonstrated good  understanding of the education provided. See Electronic Medical Record under Patient Instructions for exercises provided during therapy sessions    Assessment     Yaneth noting static pain numbers at this point; continues to have pain left SI jt area with myofascial tissue restrictions throughout her lumbar spine into gluteals/hips.  She has noted some benefits from therapy with dry needling and manual interventions as well as exercise in aquatic environment.  Recommended she consider pain management appointment for another level of therapy to address her pain - has an appointment in January with Dr. Norman.      Yaneth Is  progressing slowly towards her goals.   Patient prognosis is Excellent.     Patient will continue to benefit from skilled outpatient physical therapy to address the deficits listed in the problem list box on initial evaluation, provide pt/family education and to maximize pt's level of independence in the home and community environment.     Patient's spiritual, cultural and educational needs considered and pt agreeable to plan of care and goals.     Anticipated barriers to physical therapy: na    Goals:  Short Term Goals: 3 weeks   Pt will be compliant with HEP. > ongoing   Pt will improve quad strength by 1/2 grade to allow for strength to go up and down stairs.  > Ongoing   Pt will improve sit <>  30 sec to at least 10 allow for increased functional mobility. > ongoing      Long Term Goals: 6 weeks   Pt will be  independent with HEP to allow for increased functional tasks. > Ongoing   Pt will improve FOTO by 10 % to demonstrate improvement in quality of life.  Pt will improve hip flexor strength by 1/2 grade to allow for normalized body mechanics.   Plan      Plan of care Certification: 10/10/24. > 12/31/2024      Outpatient Physical Therapy 2 times weekly for 6 weeks to include the following interventions: Aquatic Therapy, Electrical Stimulation  , Gait Training, Manual Therapy, Moist Heat/ Ice, Neuromuscular Re-ed, Patient Education, Therapeutic Activities, Therapeutic Exercise, and Ultrasound.        Teresa Pabon, PT

## 2024-11-20 ENCOUNTER — CLINICAL SUPPORT (OUTPATIENT)
Dept: REHABILITATION | Facility: HOSPITAL | Age: 74
End: 2024-11-20
Payer: MEDICARE

## 2024-11-20 DIAGNOSIS — Z74.09 IMPAIRED FUNCTIONAL MOBILITY, BALANCE, GAIT, AND ENDURANCE: Primary | ICD-10-CM

## 2024-11-20 PROCEDURE — 97014 ELECTRIC STIMULATION THERAPY: CPT | Mod: KX,PN

## 2024-11-20 PROCEDURE — 97110 THERAPEUTIC EXERCISES: CPT | Mod: KX,PN

## 2024-11-20 PROCEDURE — 97140 MANUAL THERAPY 1/> REGIONS: CPT | Mod: KX,PN

## 2024-11-20 NOTE — PROGRESS NOTES
[facility]   Physical Therapy Treatment Note      Name: Yaneth DELUCA [facility] Number: [id number]    Therapy Diagnosis:   Encounter Diagnosis   Name Primary?    Impaired functional mobility, balance, gait, and endurance Yes       Physician: Jazzy Gallagher MD    Visit Date: 11/20/2024    Therapy Diagnosis:        Encounter Diagnoses   Name Primary?    SI (sacroiliac) joint dysfunction      Injury caused by bending, sequela      Impaired functional mobility, balance, gait, and endurance Yes        Physician: Jazzy Gallagher MD     Physician Orders: PT Eval and Treat   Medical Diagnosis from Referral: M53.3 (ICD-10-CM) - SI (sacroiliac) joint dysfunction X50.1XXS (ICD-10-CM) - Injury caused by bending, sequela  Evaluation Date: 7/10/2024  Authorization Period Expiration: 12/31/24  Plan of Care Expiration:  12/31/2024   Progress Note Due:   Date of Surgery: none at this time  Visit # / Visits authorized: 19  FOTO: 2/ 3     Precautions: Standard and Fall      Time In:  10:30  am   Time Out: 11:25 am      Total Billable Time:  45 mins     PTA Visit #: 0/5       Subjective     Patient reports:   I'm about the same.   She was compliant with home exercise program.  Response to previous treatment:  improved pain levels,   Functional change: still limited mobility,     Pain:  3 /10  Location: bilateral back - Left > Right, into left buttock     Objective      Objective Measures updated at progress report unless specified.     Treatment     Yaneth received the treatments listed below:      Aquatic therapeutic Exercises to develop strength and endurance using buoyancy of water to relieve pain in lumbar/LE's:  x 0 min   Forward Walking x 5 mins   Backwards walking x 5 mins   Side-stepping - wide x 5 mins  Hold onto bar - backward lean for lumbar flexion stretch - hold x 30+ secs and repeat x 6   Squats x 15   Hip abduction x 20  Hip flexion x 20  Hip Extension x 20   Marching x 20  Hip flex<>ER and  "back to stand x 20   UE with weights:  x 15 each   Shoulder ER  Shoulder abduction   Shoulder extension     Lateral trunk stretching x 4 each side   Rotational stretch - arms reaching out in front x 4 each side   H/S stretch 30 sec x 3 each  Achilles stretch - 30 sec x 3  Heel raises x 20         therapeutic exercises to develop strength and endurance for   15  minutes including:  Bike 15 level 3   Nustep level 5 x 20 min  Standing Hip Flex/Abd/Ext x 15  SLR 10x  Mini squats 10x  Bridges 10x  Ball squeeze x 2 min  DKC w/ PB x 2 min  LTR w/ PB x 2 min  SKC x 10       neuromuscular re-education activities to improve: Balance and Coordination for 0  minutes. The following activities were included:  Posterior pelvic tilt - 5"h x 10   Seated H/S stretching with stool - 30 sec x 3   Seated Lumbar flexion - multi-directions with use of SB - use an office  chair at home.   Toe Taps on 6" step x 2 min  FSU on 6" step x 15  LSU on 6" step x 15  Calf stretch 3 x 30 sec  Hip add ball 2 min          Palpation Assessment to determine the necessity for Functional Dry Needling  significant tenderness left side of lumbar spine - lumbar fascia; piriformis, quadratus, significant fascia tightness noted with reduction in lumbar/hip AROM.     Yaneth received the following manual therapy techniques x 30  mins : S/L on right - lumbar  L1 > L5/S1 segmental flexion with blocking of segment above, performed this on both sides; S/L: Prone:  IASTM to lumbar fascia - to address tissue restrictions.    Patient positioned in Prone: LF-ES per protocol for lumbar pain including quadratus, piriformis utilizing all 75 mm needles for ability to reach deeper tissues; clockwise winding for increased tissue grasp;  Intensity of stimulation adjusted to patient tolerance; fair rhythmical contraction of tissue noted; patient able to tolerate tx x 20 mins.  Removed needles without adverse effects; Reviewed what to expect later with patient; discussed " importance of hydration.         Patient Education and Home Exercises       Education provided:   - importance of staying active     Written Home Exercises Provided: Pt instructed to continue prior HEP. Exercises were reviewed and Yaneth was able to demonstrate them prior to the end of the session.  Yaneth demonstrated good  understanding of the education provided. See Electronic Medical Record under Patient Instructions for exercises provided during therapy sessions    Assessment     Yaneth noting static pain numbers at this point; continues to have pain left SI jt area with myofascial tissue restrictions throughout her lumbar spine into gluteals/hips.  She has noted some benefits from therapy with dry needling and manual interventions as well as exercise in aquatic environment.  Recommended she consider pain management appointment for another level of therapy to address her pain - has an appointment in January with Dr. Norman.      Yaneth Is  progressing slowly towards her goals.   Patient prognosis is Excellent.     Patient will continue to benefit from skilled outpatient physical therapy to address the deficits listed in the problem list box on initial evaluation, provide pt/family education and to maximize pt's level of independence in the home and community environment.     Patient's spiritual, cultural and educational needs considered and pt agreeable to plan of care and goals.     Anticipated barriers to physical therapy: na    Goals:  Short Term Goals: 3 weeks   Pt will be compliant with HEP. > ongoing   Pt will improve quad strength by 1/2 grade to allow for strength to go up and down stairs.  > Ongoing   Pt will improve sit <>  30 sec to at least 10 allow for increased functional mobility. > ongoing      Long Term Goals: 6 weeks   Pt will be independent with HEP to allow for increased functional tasks. > Ongoing   Pt will improve FOTO by 10 % to demonstrate improvement in quality of life.  Pt will  improve hip flexor strength by 1/2 grade to allow for normalized body mechanics.   Plan      Plan of care Certification: 10/10/24. > 12/31/2024      Outpatient Physical Therapy 2 times weekly for 6 weeks to include the following interventions: Aquatic Therapy, Electrical Stimulation  , Gait Training, Manual Therapy, Moist Heat/ Ice, Neuromuscular Re-ed, Patient Education, Therapeutic Activities, Therapeutic Exercise, and Ultrasound.        Teresa Pabon, PT

## 2024-11-27 ENCOUNTER — CLINICAL SUPPORT (OUTPATIENT)
Dept: REHABILITATION | Facility: HOSPITAL | Age: 74
End: 2024-11-27
Payer: MEDICARE

## 2024-11-27 DIAGNOSIS — Z74.09 IMPAIRED FUNCTIONAL MOBILITY, BALANCE, GAIT, AND ENDURANCE: Primary | ICD-10-CM

## 2024-11-27 PROCEDURE — 97014 ELECTRIC STIMULATION THERAPY: CPT | Mod: KX,PN

## 2024-11-27 PROCEDURE — 97110 THERAPEUTIC EXERCISES: CPT | Mod: KX,PN

## 2024-11-27 PROCEDURE — 97140 MANUAL THERAPY 1/> REGIONS: CPT | Mod: KX,PN

## 2024-11-27 NOTE — PROGRESS NOTES
[facility]   Physical Therapy Treatment Note / Discharge Note      Name: Yaneth Vera  Clinic Number: [id number]    Therapy Diagnosis:   Encounter Diagnosis   Name Primary?    Impaired functional mobility, balance, gait, and endurance Yes         Physician: Jazzy Gallagher MD    Visit Date: 11/27/2024    Therapy Diagnosis:        Encounter Diagnoses   Name Primary?    SI (sacroiliac) joint dysfunction      Injury caused by bending, sequela      Impaired functional mobility, balance, gait, and endurance Yes        Physician: Jazzy Gallagher MD     Physician Orders: PT Eval and Treat   Medical Diagnosis from Referral: M53.3 (ICD-10-CM) - SI (sacroiliac) joint dysfunction X50.1XXS (ICD-10-CM) - Injury caused by bending, sequela  Evaluation Date: 7/10/2024  Authorization Period Expiration: 12/31/24  Plan of Care Expiration:  12/31/2024   Progress Note Due:   Date of Surgery: none at this time  Visit # / Visits authorized: 20   FOTO: 2/ 3     Precautions: Standard and Fall      Time In:  10:30  am   Time Out: 11:30  am      Total Billable Time:  45 mins     PTA Visit #: 0/5       Subjective     Patient reports:   I'm about the same.  Yaneth is noting no lasting improvement in her pain from therapy at this time.  She is able to move better in the pool - has not pain while exercising in this medium, but pain returns on land.  Limited improvement from manual tx as well.   She was compliant with home exercise program.  Response to previous treatment:  limited improvement   Functional change: still limited mobility,     Pain:  3 - 4 /10  Location: bilateral back - Left > Right, into left buttock     Objective      Objective Measures updated at progress report unless specified.     Treatment     Yaneth received the treatments listed below:      Aquatic therapeutic Exercises to develop strength and endurance using buoyancy of water to relieve pain in lumbar/LE's:  x 0 min   Forward Walking x 5 mins  "  Backwards walking x 5 mins   Side-stepping - wide x 5 mins  Hold onto bar - backward lean for lumbar flexion stretch - hold x 30+ secs and repeat x 6   Squats x 15   Hip abduction x 20  Hip flexion x 20  Hip Extension x 20   Marching x 20  Hip flex<>ER and back to stand x 20   UE with weights:  x 15 each   Shoulder ER  Shoulder abduction   Shoulder extension     Lateral trunk stretching x 4 each side   Rotational stretch - arms reaching out in front x 4 each side   H/S stretch 30 sec x 3 each  Achilles stretch - 30 sec x 3  Heel raises x 20         therapeutic exercises to develop strength and endurance for   20  minutes including:  Bike 15 level 3   Nustep level 5 x 20 min  Standing Hip Flex/Abd/Ext x 15  SLR 10x  Mini squats 10x  Bridges 10x  Ball squeeze x 2 min  DKC w/ PB x 2 min  LTR w/ PB x 2 min  SKC x 10       neuromuscular re-education activities to improve: Balance and Coordination for 0  minutes. The following activities were included:  Posterior pelvic tilt - 5"h x 10   Seated H/S stretching with stool - 30 sec x 3   Seated Lumbar flexion - multi-directions with use of SB - use an office  chair at home.   Toe Taps on 6" step x 2 min  FSU on 6" step x 15  LSU on 6" step x 15  Calf stretch 3 x 30 sec  Hip add ball 2 min          Palpation Assessment to determine the necessity for Functional Dry Needling  significant tenderness left side of lumbar spine - lumbar fascia; piriformis, quadratus, significant fascia tightness noted with reduction in lumbar/hip AROM.  25 mmins     Yaneth received the following manual therapy techniques x 30  mins : S/L on right - lumbar  L1 > L5/S1 segmental flexion with blocking of segment above, performed this on both sides; S/L: Prone:  IASTM to lumbar fascia - to address tissue restrictions.    Patient positioned in Prone: LF-ES per protocol for lumbar pain including quadratus, piriformis utilizing all 75 mm needles for ability to reach deeper tissues; clockwise winding for " increased tissue grasp;  Intensity of stimulation adjusted to patient tolerance; fair rhythmical contraction of tissue noted; patient able to tolerate tx x 20 mins.  Removed needles without adverse effects; Reviewed what to expect later with patient; discussed importance of hydration.         Patient Education and Home Exercises       Education provided:   - importance of staying active     Written Home Exercises Provided: Pt instructed to continue prior HEP. Exercises were reviewed and Yaneth was able to demonstrate them prior to the end of the session.  Yaneth demonstrated good  understanding of the education provided. See Electronic Medical Record under Patient Instructions for exercises provided during therapy sessions    Assessment     Yaneth noting static pain numbers at this point; continues to have pain left SI jt area with myofascial tissue restrictions throughout her lumbar spine into gluteals/hips.  She has noted some benefits from therapy with dry needling and manual interventions as well as exercise in aquatic environment.  Today was last approved visit and patient is heading out of town in 2 weeks and will be gone until the end of December.  Yaneth is going to continue with pool exercises via our Stay[employer] program.  She has an appointment with Dr. Norman in January.  Will need some diagnostic testing at this point. Will discharge from PT at this time.  Minimal functional improvement noted for any length of time.  Minimal improvement in pain for any length of time.  Needs to return to PCP for further referral and testing.      Yaneth Is  progressing slowly towards her goals.   Patient prognosis is Excellent.     Patient will continue to benefit from skilled outpatient physical therapy to address the deficits listed in the problem list box on initial evaluation, provide pt/family education and to maximize pt's level of independence in the home and community environment.     Patient's spiritual, cultural and  educational needs considered and pt agreeable to plan of care and goals.     Anticipated barriers to physical therapy: na    Goals:  Short Term Goals: 3 weeks   Pt will be compliant with HEP. > MET   Pt will improve quad strength by 1/2 grade to allow for strength to go up and down stairs.  > Ongoing with minimal progress    Pt will improve sit <>  30 sec to at least 10 allow for increased functional mobility. >achieved 8;      Long Term Goals: 6 weeks   Pt will be independent with HEP to allow for increased functional tasks. > Ongoing   Pt will improve FOTO by 10 % to demonstrate improvement in quality of life. > no Met   Pt will improve hip flexor strength by 1/2 grade to allow for normalized body mechanics.  > not Met   Plan      Discharge from physical therapy at this time.  Return to PCP for further diagnostics/referral due to limited improvement in pain/function.      Teresa Pabon, PT

## 2025-01-10 NOTE — TELEPHONE ENCOUNTER
LM for patient to call back to schedule yearly exam.   Quality 137: Melanoma: Continuity Of Care - Recall System: Patient information entered into a recall system that includes: target date for the next exam specified AND a process to follow up with patients regarding missed or unscheduled appointments Detail Level: Detailed Quality 397: Melanoma: Reporting: Pathology report includes the pT Category, thickness, ulceration and mitotic rate, peripheral and deep margin status and presence or absence of microsatellitosis for invasive tumors. Quality 226: Preventive Care And Screening: Tobacco Use: Screening And Cessation Intervention: Patient screened for tobacco use, is a smoker AND received Cessation Counseling within measurement period or in the six months prior to the measurement period

## 2025-01-27 DIAGNOSIS — Z76.0 ENCOUNTER FOR MEDICATION REFILL: ICD-10-CM

## 2025-01-27 RX ORDER — OMEPRAZOLE 20 MG/1
20 CAPSULE, DELAYED RELEASE ORAL DAILY
Qty: 90 CAPSULE | Refills: 1 | Status: SHIPPED | OUTPATIENT
Start: 2025-01-27

## 2025-01-27 NOTE — TELEPHONE ENCOUNTER
No care due was identified.  [facility] Embedded Care Due Messages. Reference number: [id number].   1/27/2025 9:31:52 AM CST

## 2025-01-27 NOTE — TELEPHONE ENCOUNTER
Refill Authorization Note     Refill Decision Note   Yaneth Vera  is requesting a refill authorization.  Brief Assessment and Rationale for Refill:  Approve     Medication Therapy Plan:       Medication Reconciliation Completed: No   Comments:     No Care Gaps recommended.     Note composed:1:21 PM 01/27/2025

## 2025-01-28 ENCOUNTER — HOSPITAL ENCOUNTER (OUTPATIENT)
Facility: HOSPITAL | Age: 75
Discharge: HOME | End: 2025-01-28
Attending: ANESTHESIOLOGY | Admitting: ANESTHESIOLOGY
Payer: MEDICARE

## 2025-01-28 VITALS
DIASTOLIC BLOOD PRESSURE: 69 MMHG | OXYGEN SATURATION: 96 % | BODY MASS INDEX: 39.99 KG/M2 | SYSTOLIC BLOOD PRESSURE: 145 MMHG | HEART RATE: 48 BPM | WEIGHT: 240 LBS | TEMPERATURE: 98 F | HEIGHT: 65 IN | RESPIRATION RATE: 15 BRPM

## 2025-01-28 DIAGNOSIS — M53.3 SACROILIAC JOINT DYSFUNCTION: ICD-10-CM

## 2025-01-28 DIAGNOSIS — M53.3 SI (SACROILIAC) JOINT DYSFUNCTION: Primary | ICD-10-CM

## 2025-01-28 PROCEDURE — 25500020 PHARM REV CODE 255: Performed by: ANESTHESIOLOGY

## 2025-01-28 PROCEDURE — 99152 MOD SED SAME PHYS/QHP 5/>YRS: CPT | Performed by: ANESTHESIOLOGY

## 2025-01-28 PROCEDURE — 20610 DRAIN/INJ JOINT/BURSA W/O US: CPT | Mod: 59,LT | Performed by: ANESTHESIOLOGY

## 2025-01-28 PROCEDURE — 27096 INJECT SACROILIAC JOINT: CPT | Mod: LT | Performed by: ANESTHESIOLOGY

## 2025-01-28 PROCEDURE — 63600175 PHARM REV CODE 636 W HCPCS: Performed by: ANESTHESIOLOGY

## 2025-01-28 RX ORDER — BUPIVACAINE HYDROCHLORIDE 5 MG/ML
INJECTION, SOLUTION EPIDURAL; INTRACAUDAL
Status: DISCONTINUED | OUTPATIENT
Start: 2025-01-28 | End: 2025-01-28 | Stop reason: HOSPADM

## 2025-01-28 RX ORDER — MIDAZOLAM HYDROCHLORIDE 5 MG/ML
INJECTION INTRAMUSCULAR; INTRAVENOUS
Status: DISCONTINUED | OUTPATIENT
Start: 2025-01-28 | End: 2025-01-28 | Stop reason: HOSPADM

## 2025-01-28 RX ORDER — METHYLPREDNISOLONE ACETATE 80 MG/ML
INJECTION, SUSPENSION INTRA-ARTICULAR; INTRALESIONAL; INTRAMUSCULAR; SOFT TISSUE
Status: DISCONTINUED | OUTPATIENT
Start: 2025-01-28 | End: 2025-01-28 | Stop reason: HOSPADM

## 2025-01-28 RX ORDER — LIDOCAINE HYDROCHLORIDE 10 MG/ML
INJECTION, SOLUTION INFILTRATION; PERINEURAL
Status: DISCONTINUED | OUTPATIENT
Start: 2025-01-28 | End: 2025-01-28 | Stop reason: HOSPADM

## 2025-01-28 RX ORDER — SODIUM CHLORIDE, SODIUM LACTATE, POTASSIUM CHLORIDE, CALCIUM CHLORIDE 600; 310; 30; 20 MG/100ML; MG/100ML; MG/100ML; MG/100ML
INJECTION, SOLUTION INTRAVENOUS CONTINUOUS
Status: DISCONTINUED | OUTPATIENT
Start: 2025-01-28 | End: 2025-01-28 | Stop reason: HOSPADM

## 2025-01-28 RX ORDER — FENTANYL CITRATE 50 UG/ML
INJECTION, SOLUTION INTRAMUSCULAR; INTRAVENOUS
Status: DISCONTINUED | OUTPATIENT
Start: 2025-01-28 | End: 2025-01-28 | Stop reason: HOSPADM

## 2025-01-28 NOTE — PLAN OF CARE
Discharge instructions given to patient and her , both verablized understanding and voiced no questions or concerns at this time. Will discontinue IV and let patient change for discharge, patient tolerating po fluids.

## 2025-01-28 NOTE — H&P
"FOLLOW UP NOTE:     CHIEF COMPLAINT: back and hip pain    INTERVAL HISTORY OF PRESENT ILLNESS: Yaneth Vera is a 74 y.o. female who presents for GTB INJECTION LEFT HIP AND LEFT SI JOINT INJECTION . The patient denies of any significant changes in her health since her last appointment. The patient also denies of any changes in the character of her pain since her last appointment.     ROS:  Review of Systems   Constitutional:  Negative for chills and fever.   HENT:  Negative for sore throat.    Eyes:  Negative for visual disturbance.   Respiratory:  Negative for shortness of breath.    Cardiovascular:  Negative for chest pain.   Gastrointestinal:  Negative for nausea and vomiting.   Genitourinary:  Negative for difficulty urinating.   Musculoskeletal:  Positive for arthralgias and back pain.   Skin:  Negative for rash.   Allergic/Immunologic: Negative for immunocompromised state.   Neurological:  Negative for syncope.   Hematological:  Does not bruise/bleed easily.   Psychiatric/Behavioral:  Negative for suicidal ideas.         MEDICAL, SURGICAL, FAMILY, SOCIAL HX: reviewed    MEDICATIONS/ALLERGIES: reviewed    PHYSICAL EXAM:    VITALS: Vitals reviewed.   Vitals:    01/28/25 0729   BP: (!) 170/75   Pulse: (!) 56   Resp: 15   Temp: 97.6 °F (36.4 °C)   TempSrc: Temporal   SpO2: 97%   Weight: 108.9 kg (240 lb)   Height: 5' 5" (1.651 m)       Physical Exam  Vitals and nursing note reviewed.   Constitutional:       Appearance: Normal appearance. She is not toxic-appearing or diaphoretic.   HENT:      Head: Normocephalic and atraumatic.   Eyes:      General:         Right eye: No discharge.         Left eye: No discharge.      Extraocular Movements: Extraocular movements intact.      Conjunctiva/sclera: Conjunctivae normal.   Cardiovascular:      Rate and Rhythm: Normal rate.   Pulmonary:      Effort: Pulmonary effort is normal. No respiratory distress.      Breath sounds: Normal breath sounds.   Abdominal:      Palpations: " Abdomen is soft.   Skin:     General: Skin is warm and dry.      Findings: No rash.   Neurological:      Mental Status: She is alert and oriented to person, place, and time.   Psychiatric:         Mood and Affect: Mood and affect normal.         Cognition and Memory: Memory normal.         Judgment: Judgment normal.          EXTREMITIES:    Gen: No cyanosis, edema, varicosities, or tenderness to palpation BLE   Skin: Warm, pink, dry, no rashes, no lesions BLE   Strength: 5/5 motor strength BLE   ROM: hips, knees and ankles without pain or instability.     NEUROLOGICAL:    Gen: No clonus or spasticity.   Gait: Normal without antalgic lean   DTR's: 2+ in bilateral patellar, and ankle   BABINSKI: Absent bilaterally  Sensory: Intact to light touch and proprioception BLE    ASSESSMENT: Yaneth Vera is a 74 y.o. female who presents for GTB INJECTION LEFT HIP AND LEFT SI JOINT INJECTION .     PLAN:  Proceed with GTB INJECTION LEFT HIP AND LEFT SI JOINT INJECTION  as previously discussed.    This patient has been cleared for surgery in an ambulatory surgical facility    ASA 3,  Mallampatti Score 3  No history of anesthetic complications  Plan for RN IV sedation    Leonila Norman MD  Pain Management

## 2025-01-28 NOTE — DISCHARGE INSTRUCTIONS
[facility] [phone]  [facility] [phone]

## 2025-01-28 NOTE — DISCHARGE SUMMARY
[facility]  Discharge Note  Short Stay    Procedure(s) (LRB):  GTB INJECTION LEFT HIP AND LEFT SI JOINT INJECTION (Left)  INJECTION,SACROILIAC JOINT (Left)      OUTCOME: Patient tolerated treatment/procedure well without complication and is now ready for discharge.    DISPOSITION: Home or Self Care    FINAL DIAGNOSIS:   Sacroiliac joint dysfunction  Left greater trochanteric bursitis     FOLLOWUP: In clinic    DISCHARGE INSTRUCTIONS:    Discharge Procedure Orders   Diet general     Call MD for:  temperature >100.4     Call MD for:  persistent nausea and vomiting     Call MD for:  severe uncontrolled pain     Call MD for:  difficulty breathing, headache or visual disturbances     Call MD for:  redness, tenderness, or signs of infection (pain, swelling, redness, odor or green/yellow discharge around incision site)     Call MD for:  hives     Call MD for:  persistent dizziness or light-headedness     Call MD for:  extreme fatigue        TIME SPENT ON DISCHARGE: 30 minutes

## 2025-01-28 NOTE — PLAN OF CARE
0833 Received patient from procedure room.  Report received, placed patient on monitors, VSS.  Will continue to monitor patient per protocol until discharge.

## 2025-01-28 NOTE — OP NOTE
Procedure Date: 1/28/2025    PROCEDURE:  Left sacroiliac joint injection utilizing fluoroscopy.    DIAGNOSIS: Left sided sacroiliitis.  Post op diagnosis: same    PHYSICIAN: Leonila Norman MD    MEDICATIONS INJECTED:  Methylprednisone 40 mg and 2 ml Bupivacaine 0.5%.    LOCAL ANESTHETIC USED: Lidocaine 1%,4 ml total.     SEDATION MEDICATIONS: RN IV sedation    ESTIMATED BLOOD LOSS: none    COMPLICATIONS: none    TECHNIQUE:   Time-out taken to identify patient and procedure side prior to starting the procedure. After placing the patient in the prone position, the patient was prepped and draped in the usual sterile fashion using ChloraPrep and sterile towels.  The area was determined under fluoroscopy in the AP view.  Lidocaine was injected by raising a wheal and going down to the periosteum using a 25-gauge 1.5 inch needle.  The 3.5 inch 22-gauge spinal needle was introduced into inferior opening of the left sacroiliac joint.  Negative pressure applied to confirm no intravascular placement.  3 ml of contrast dye was injected to confirm placement and to confirm that there was no vascular uptake. The medication was then injected slowly. The patient tolerated the procedure well.      Left Greater Trochanteric Bursa Injection Under Fluoroscopic Guidance -    All medications, allergies, and relevant histories were reviewed. No recent antibiotics or infections.  A time-out was taken to verify the correct patient, procedure, laterality, and appropriate medications/allergies.    After informed consent was taken patient is placed in prone position and the left hip area exposed. Patient was prepped in sterile fashion. The left greater trochanter was identified using AP fluoroscopy. Then using a 57-gauge 1.5 inch needle local anesthetic was injected to make a skin wheal. Then a 22-gauge 5 inch spinal needle was introduced into the greater trochanter under live intermittent fluoroscopy. 2 mL of Omnipaque contrat was then injected  to rule out intra-vascular placement. After negative aspiration, a 5 ml solution containing 0.5% bupivacaine and 40 mg Depo-Medrol was intermittently injected. Patient tolerated procedure well with no complications.     The patient was monitored after the procedure.  The patient was given post procedure and discharge instructions to follow at home. The patient was discharged in a stable condition

## 2025-02-22 DIAGNOSIS — Z00.00 ENCOUNTER FOR MEDICARE ANNUAL WELLNESS EXAM: ICD-10-CM

## 2025-02-28 ENCOUNTER — HOSPITAL ENCOUNTER (OUTPATIENT)
Dept: RADIOLOGY | Facility: HOSPITAL | Age: 75
Discharge: HOME | End: 2025-02-28
Attending: ANESTHESIOLOGY
Payer: MEDICARE

## 2025-02-28 DIAGNOSIS — M51.369 OTHER INTERVERTEBRAL DISC DEGENERATION OF LUMBAR REGION WITHOUT LUMBAR BACK PAIN OR LOWER EXTREMITY PAIN: ICD-10-CM

## 2025-02-28 DIAGNOSIS — Z78.0 MENOPAUSE: ICD-10-CM

## 2025-02-28 PROCEDURE — 72148 MRI LUMBAR SPINE W/O DYE: CPT | Mod: 26,,, | Performed by: RADIOLOGY

## 2025-02-28 PROCEDURE — 72148 MRI LUMBAR SPINE W/O DYE: CPT | Mod: TC

## 2025-03-04 ENCOUNTER — PATIENT MESSAGE (OUTPATIENT)
Dept: INTERNAL MEDICINE | Facility: CLINIC | Age: 75
End: 2025-03-04
Payer: MEDICARE

## 2025-03-05 ENCOUNTER — OFFICE VISIT (OUTPATIENT)
Dept: FAMILY MEDICINE | Facility: CLINIC | Age: 75
End: 2025-03-05
Payer: MEDICARE

## 2025-03-05 ENCOUNTER — LAB VISIT (OUTPATIENT)
Dept: LAB | Facility: HOSPITAL | Age: 75
End: 2025-03-05
Payer: MEDICARE

## 2025-03-05 ENCOUNTER — RESULTS FOLLOW-UP (OUTPATIENT)
Dept: FAMILY MEDICINE | Facility: CLINIC | Age: 75
End: 2025-03-05

## 2025-03-05 VITALS
OXYGEN SATURATION: 97 % | DIASTOLIC BLOOD PRESSURE: 82 MMHG | BODY MASS INDEX: 40.24 KG/M2 | HEART RATE: 53 BPM | HEIGHT: 65 IN | SYSTOLIC BLOOD PRESSURE: 134 MMHG | WEIGHT: 241.5 LBS

## 2025-03-05 DIAGNOSIS — E66.01 MORBID OBESITY: ICD-10-CM

## 2025-03-05 DIAGNOSIS — K21.9 GASTROESOPHAGEAL REFLUX DISEASE, UNSPECIFIED WHETHER ESOPHAGITIS PRESENT: ICD-10-CM

## 2025-03-05 DIAGNOSIS — Z78.0 ASYMPTOMATIC MENOPAUSAL STATE: ICD-10-CM

## 2025-03-05 DIAGNOSIS — Z12.11 SCREENING FOR COLON CANCER: ICD-10-CM

## 2025-03-05 DIAGNOSIS — E03.8 SUBCLINICAL HYPOTHYROIDISM: ICD-10-CM

## 2025-03-05 DIAGNOSIS — R73.03 PREDIABETES: ICD-10-CM

## 2025-03-05 DIAGNOSIS — Z78.9 NONSMOKER: ICD-10-CM

## 2025-03-05 DIAGNOSIS — R73.03 PREDIABETES: Primary | ICD-10-CM

## 2025-03-05 DIAGNOSIS — Z13.31 NEGATIVE DEPRESSION SCREENING: ICD-10-CM

## 2025-03-05 LAB
ALBUMIN SERPL BCP-MCNC: 3.5 G/DL (ref 3.5–5.2)
ALP SERPL-CCNC: 129 U/L (ref 40–150)
ALT SERPL W/O P-5'-P-CCNC: 20 U/L (ref 10–44)
ANION GAP SERPL CALC-SCNC: 13 MMOL/L (ref 8–16)
AST SERPL-CCNC: 23 U/L (ref 10–40)
BASOPHILS # BLD AUTO: 0.03 K/UL (ref 0–0.2)
BASOPHILS NFR BLD: 0.5 % (ref 0–1.9)
BILIRUB SERPL-MCNC: 0.7 MG/DL (ref 0.1–1)
BUN SERPL-MCNC: 16 MG/DL (ref 8–23)
CALCIUM SERPL-MCNC: 8.7 MG/DL (ref 8.7–10.5)
CHLORIDE SERPL-SCNC: 105 MMOL/L (ref 95–110)
CO2 SERPL-SCNC: 21 MMOL/L (ref 23–29)
CREAT SERPL-MCNC: 0.8 MG/DL (ref 0.5–1.4)
DIFFERENTIAL METHOD BLD: ABNORMAL
EOSINOPHIL # BLD AUTO: 0.1 K/UL (ref 0–0.5)
EOSINOPHIL NFR BLD: 0.8 % (ref 0–8)
ERYTHROCYTE [DISTWIDTH] IN BLOOD BY AUTOMATED COUNT: 12.9 % (ref 11.5–14.5)
EST. GFR  (NO RACE VARIABLE): >60 ML/MIN/1.73 M^2
ESTIMATED AVG GLUCOSE: 217 MG/DL (ref 68–131)
GLUCOSE SERPL-MCNC: 171 MG/DL (ref 70–110)
HBA1C MFR BLD: 9.2 % (ref 4–5.6)
HCT VFR BLD AUTO: 41 % (ref 37–48.5)
HGB BLD-MCNC: 13.4 G/DL (ref 12–16)
IMM GRANULOCYTES # BLD AUTO: 0.11 K/UL (ref 0–0.04)
IMM GRANULOCYTES NFR BLD AUTO: 1.7 % (ref 0–0.5)
LYMPHOCYTES # BLD AUTO: 1.9 K/UL (ref 1–4.8)
LYMPHOCYTES NFR BLD: 29.2 % (ref 18–48)
MCH RBC QN AUTO: 30 PG (ref 27–31)
MCHC RBC AUTO-ENTMCNC: 32.7 G/DL (ref 32–36)
MCV RBC AUTO: 92 FL (ref 82–98)
MONOCYTES # BLD AUTO: 0.4 K/UL (ref 0.3–1)
MONOCYTES NFR BLD: 5.5 % (ref 4–15)
NEUTROPHILS # BLD AUTO: 4.1 K/UL (ref 1.8–7.7)
NEUTROPHILS NFR BLD: 62.3 % (ref 38–73)
NRBC BLD-RTO: 0 /100 WBC
PLATELET # BLD AUTO: 171 K/UL (ref 150–450)
PMV BLD AUTO: 9.8 FL (ref 9.2–12.9)
POTASSIUM SERPL-SCNC: 3.9 MMOL/L (ref 3.5–5.1)
PROT SERPL-MCNC: 7.1 G/DL (ref 6–8.4)
RBC # BLD AUTO: 4.47 M/UL (ref 4–5.4)
SODIUM SERPL-SCNC: 139 MMOL/L (ref 136–145)
TSH SERPL DL<=0.005 MIU/L-ACNC: 2.79 UIU/ML (ref 0.4–4)
WBC # BLD AUTO: 6.54 K/UL (ref 3.9–12.7)

## 2025-03-05 PROCEDURE — 85025 COMPLETE CBC W/AUTO DIFF WBC: CPT | Performed by: FAMILY MEDICINE

## 2025-03-05 PROCEDURE — 83036 HEMOGLOBIN GLYCOSYLATED A1C: CPT | Performed by: FAMILY MEDICINE

## 2025-03-05 PROCEDURE — 99999 PR PBB SHADOW E&M-EST. PATIENT-LVL III: CPT | Mod: PBBFAC,,, | Performed by: FAMILY MEDICINE

## 2025-03-05 PROCEDURE — 99213 OFFICE O/P EST LOW 20 MIN: CPT | Mod: PBBFAC,PN | Performed by: FAMILY MEDICINE

## 2025-03-05 PROCEDURE — G2211 COMPLEX E/M VISIT ADD ON: HCPCS | Mod: S$PBB,,, | Performed by: FAMILY MEDICINE

## 2025-03-05 PROCEDURE — 99214 OFFICE O/P EST MOD 30 MIN: CPT | Mod: S$PBB,,, | Performed by: FAMILY MEDICINE

## 2025-03-05 PROCEDURE — 36415 COLL VENOUS BLD VENIPUNCTURE: CPT | Performed by: FAMILY MEDICINE

## 2025-03-05 PROCEDURE — 84443 ASSAY THYROID STIM HORMONE: CPT | Performed by: FAMILY MEDICINE

## 2025-03-05 PROCEDURE — 80053 COMPREHEN METABOLIC PANEL: CPT | Performed by: FAMILY MEDICINE

## 2025-03-05 RX ORDER — BLOOD-GLUCOSE METER
EACH MISCELLANEOUS
Qty: 1 EACH | Refills: 0 | Status: SHIPPED | OUTPATIENT
Start: 2025-03-05

## 2025-03-05 RX ORDER — BLOOD-GLUCOSE CONTROL, NORMAL
EACH MISCELLANEOUS
Qty: 100 EACH | Refills: 2 | Status: SHIPPED | OUTPATIENT
Start: 2025-03-05

## 2025-03-05 RX ORDER — OMEPRAZOLE 20 MG/1
20 CAPSULE, DELAYED RELEASE ORAL DAILY
Qty: 90 CAPSULE | Refills: 3 | Status: SHIPPED | OUTPATIENT
Start: 2025-03-05

## 2025-03-05 RX ORDER — BLOOD-GLUCOSE METER
EACH MISCELLANEOUS
Qty: 100 STRIP | Refills: 0 | Status: SHIPPED | OUTPATIENT
Start: 2025-03-05

## 2025-03-05 RX ORDER — OMEPRAZOLE 20 MG/1
20 CAPSULE, DELAYED RELEASE ORAL DAILY
Qty: 90 CAPSULE | Refills: 1 | Status: CANCELLED | OUTPATIENT
Start: 2025-03-05

## 2025-03-05 RX ORDER — INSULIN PUMP SYRINGE, 3 ML
EACH MISCELLANEOUS
Qty: 1 EACH | Refills: 0 | Status: SHIPPED | OUTPATIENT
Start: 2025-03-05 | End: 2025-03-05

## 2025-03-05 RX ORDER — LANCETS
EACH MISCELLANEOUS
Qty: 100 EACH | Refills: 2 | Status: SHIPPED | OUTPATIENT
Start: 2025-03-05 | End: 2025-03-05

## 2025-03-05 NOTE — TELEPHONE ENCOUNTER
Refill Decision Note   Yaneth Vera  is requesting a refill authorization.  Brief Assessment and Rationale for Refill:  Approve     Medication Therapy Plan:  Resent script with ICD10 code.      Comments:     Note composed:5:26 PM 03/05/2025

## 2025-03-05 NOTE — PROGRESS NOTES
Subjective     Patient ID: Yaneth Vera is a 74 y.o. female.    Chief Complaint: Annual Exam    Visit today included increased complexity associated with the care of the episodic problem listed below addressed and managing the longitudinal care of the patient due to the serious and/or complex managed problem(s) listed below.     Prediabetes, obesity, mixed hyperlipidemia:  Currently working on diet and exercise modifications.    Subclinical hypothyroidism:  Patient asymptomatic    GERD: Patient states she only takes her PPI p.r.n. usually triggered by red sauce based foods    Health maintenance: DEXA scan and Cologuard due this year      Review of Systems   Constitutional:  Negative for appetite change, chills, fatigue, fever and unexpected weight change.   HENT:  Negative for ear discharge, ear pain, hearing loss, mouth dryness, mouth sores, postnasal drip, rhinorrhea, sinus pressure/congestion, sneezing, sore throat and trouble swallowing.    Eyes:  Negative for photophobia, pain, discharge, redness and itching.   Respiratory:  Negative for cough, chest tightness, shortness of breath and wheezing.    Cardiovascular:  Negative for chest pain, palpitations and leg swelling.   Gastrointestinal:  Negative for abdominal pain, blood in stool, constipation, diarrhea, nausea and vomiting.   Endocrine: Negative for cold intolerance, heat intolerance, polydipsia, polyphagia and polyuria.   Genitourinary:  Negative for bladder incontinence, difficulty urinating, dysuria, frequency, hematuria, nocturia and urgency.   Musculoskeletal:  Negative for arthralgias, gait problem and joint swelling.   Integumentary:  Negative for rash and wound.   Neurological:  Negative for dizziness, vertigo, syncope, weakness, numbness and headaches.   Psychiatric/Behavioral:  Negative for agitation, behavioral problems, confusion, dysphoric mood, self-injury, sleep disturbance and suicidal ideas. The patient is not nervous/anxious and is not  hyperactive.           Objective     Physical Exam  Vitals reviewed.   Constitutional:       General: She is not in acute distress.     Appearance: Normal appearance. She is obese. She is not ill-appearing or toxic-appearing.   Cardiovascular:      Rate and Rhythm: Normal rate and regular rhythm.      Heart sounds: Normal heart sounds.   Pulmonary:      Effort: Pulmonary effort is normal.      Breath sounds: Normal breath sounds.   Neurological:      General: No focal deficit present.      Mental Status: She is alert and oriented to person, place, and time.   Psychiatric:         Mood and Affect: Mood normal.         Behavior: Behavior normal.            Assessment and Plan     1. Prediabetes  -     Hemoglobin A1C; Future; Expected date: 03/05/2025    2. Morbid obesity    3. Subclinical hypothyroidism  -     TSH; Future; Expected date: 03/05/2025    4. Encounter for medication refill  -     omeprazole (PRILOSEC) 20 MG capsule; Take 1 capsule (20 mg total) by mouth once daily.  Dispense: 90 capsule; Refill: 3    5. Gastroesophageal reflux disease, unspecified whether esophagitis present  -     CBC Auto Differential; Future; Expected date: 03/05/2025  -     Comprehensive Metabolic Panel; Future; Expected date: 03/05/2025    6. BMI 40.0-44.9, adult    7. Screening for colon cancer  -     Cologuard Screening (Multitarget Stool DNA); Future; Expected date: 03/05/2025    8. Negative depression screening    9. Nonsmoker      Refilled medications   Counseled on p.r.n. use of PPI due to adverse effects   Discussed diet and exercise modifications  Ordered screening tests  Risks, benefits, and side effects were discussed with the patient. All questions were answered to the fullest satisfaction of the patient, and pt verbalized understanding and agreement to treatment plan. Pt was to call with any new or worsening symptoms, or present to the ER.  Return to clinic 1 year sooner if needed       Jazzy Gallagher MD  Family Medicine  Physician   [facility]     This note was created using M*Modal voice recognition software that occasionally may misinterpret phrases or words.

## 2025-03-05 NOTE — TELEPHONE ENCOUNTER
No care due was identified.  [facility] Embedded Care Due Messages. Reference number: [id number].   3/05/2025 2:36:25 PM CST

## 2025-03-23 ENCOUNTER — PATIENT MESSAGE (OUTPATIENT)
Dept: INTERNAL MEDICINE | Facility: CLINIC | Age: 75
End: 2025-03-23
Payer: MEDICARE

## 2025-04-09 DIAGNOSIS — M53.3 SACROCOCCYGEAL DISORDERS, NOT ELSEWHERE CLASSIFIED: Primary | ICD-10-CM

## 2025-04-10 ENCOUNTER — CLINICAL SUPPORT (OUTPATIENT)
Dept: REHABILITATION | Facility: HOSPITAL | Age: 75
End: 2025-04-10
Payer: MEDICARE

## 2025-04-10 DIAGNOSIS — M53.3 SACROCOCCYGEAL DISORDERS, NOT ELSEWHERE CLASSIFIED: ICD-10-CM

## 2025-04-10 PROCEDURE — 97161 PT EVAL LOW COMPLEX 20 MIN: CPT | Mod: PN

## 2025-04-10 PROCEDURE — 97110 THERAPEUTIC EXERCISES: CPT | Mod: PN

## 2025-04-11 ENCOUNTER — PATIENT MESSAGE (OUTPATIENT)
Dept: INTERNAL MEDICINE | Facility: CLINIC | Age: 75
End: 2025-04-11
Payer: MEDICARE

## 2025-04-14 NOTE — PROGRESS NOTES
Outpatient Rehab    Physical Therapy Evaluation    Patient Name: Yaneth Vera  MRN: [id number]  YOB: 1950  Encounter Date: 4/10/2025    Therapy Diagnosis:   Encounter Diagnosis   Name Primary?    Sacrococcygeal disorders, not elsewhere classified      Physician: Leonila Norman MD    Physician Orders: Eval and Treat  Medical Diagnosis: Sacrococcygeal disorders, not elsewhere classified    Visit # / Visits Authorized:  1 / 1  Insurance Authorization Period: 4/9/2025 to 4/9/2026  Date of Evaluation: 4/10/2025  Plan of Care Certification: 4/10/2025 to 7/10/25     Time In: 1530   Time Out: 1615  Total Time: 45   Total Billable Time:      Intake Outcome Measure for FOTO Survey    Therapist reviewed FOTO scores for Yaneth Vera on 4/10/2025.   FOTO report - see Media section or FOTO account episode details.     Intake Score:  %         Subjective   History of Present Illness  Yaneth is a 74 y.o. female who reports to physical therapy with a chief concern of hip and butt pain.     The patient reports a medical diagnosis of M53.3 (ICD-10-CM) - Sacrococcygeal disorders, not elsewhere classified. The patient has experienced this issue since 04/10/25.           History of Present Condition/Illness: Pt presents to therapy with complaints of hip and low back pain. Pt reports this has been going on for about 4 years. Pt reports she just got back from [place] but see has been still having pain since the last time she was at therapy.     Activities of Daily Living      General Prior Level of Function Comments: I  General Current Level of Function Comments: I       Previously independent with activities of daily living? Yes     Currently independent with activities of daily living? Yes          Previously independent with instrumental activities of daily living? Yes     Currently independent with instrumental activities of daily living? Yes              Pain     Patient reports a current pain level of 4/10. Pain at  best is reported as 3/10. Pain at worst is reported as 8/10.   Location: left side hip and butt pain  Clinical Progression (since onset): Stable  Pain Qualities: Aching, Throbbing  Pain-Relieving Factors: Medications - over-the-counter, Rest         Review of Systems  Patient reports: Diabetes  Patient denies: Cancer History and Cardiac History        Living Arrangements  Living Situation  Housing: Home independently  Living Arrangements: Spouse/significant other        Employment  Patient does not report that: Does the patient's condition impact their ability to work?  Employment Status: Retired          Past Medical History/Physical Systems Review:   Yaneth Vera  has a past medical history of Basal cell carcinoma.    Yaneth Vera  has a past surgical history that includes Tubal ligation; Eye surgery (Left); Injection of joint (Left, 1/28/2025); and injection, sacroiliac joint (Left, 1/28/2025).    Yaneth has a current medication list which includes the following prescription(s): calcium carbonate, dorzolamide-timolol 2-0.5%, ergocalciferol (vitamin d2), glucosamine sulfate, lancets, omega 3-dha-epa-fish oil, omeprazole, triamcinolone acetonide 0.1%, true metrix glucose meter, true metrix pro test strip, and turmeric.    Review of patient's allergies indicates:   Allergen Reactions    Glimepiride     Metformin         Objective   Posture  Patient presents with a Forward head position. Increased lumbar lordosis is observed.   Shoulders are Rounded. Pelvic tilt observed: Anterior         Bilaterally, hips are: Externally Rotated  Bilateral ankles/feet exhibit: Toe Out        Hip Range of Motion    Pt strength is grossly 4/5               Knee Strength   Right Strength Right Pain Left Strength Left  Pain   Flexion (S2) 4-   4+     Prone Flexion           Extension (L3) 4-   4+                Hip Special Tests  90/90 Hamstring Test  Positive: Right and Left     Straight Leg Raise Test  Positive: Left  Negative: Right             Knee Special Tests                  Gait Analysis  Base of Support: Wide  Gait Pattern: Antalgic                   Treatment:  Therapeutic Exercise  TE 1: supine hip abd  TE 2: sitting ball squeezes  TE 3: sitting hamstring stretch.      Time Entry(in minutes):  PT Evaluation (Low) Time Entry: 30  Therapeutic Exercise Time Entry: 15    Assessment & Plan   Assessment  Yaneth presents with a condition of Low complexity.   Presentation of Symptoms: Stable  Will Comorbidities Impact Care: No       Functional Limitations: Activity tolerance, Ambulating on uneven surfaces, Bed mobility, Carrying objects, Functional mobility, Gait limitations, Increased risk of fall, Maintaining balance, Manipulating objects, Pain with ADLs/IADLs, Painful locomotion/ambulation, Range of motion, Reaching, Standing tolerance, Transfers  Impairments: Abnormal coordination, Abnormal gait, Abnormal muscle tone, Abnormal muscle firing, Abnormal or restricted range of motion, Activity intolerance, Impaired balance, Impaired physical strength, Pain with functional activity  Personal Factors Affecting Prognosis: Pain    Patient Goal for Therapy (PT): to no longer have left hip pain.  Prognosis: Good  Assessment Details: Pt presents to PT following chronic pain that has been going on for about 4 years. Patient presents with antalgic gait with waddling gait. Pt is leaving to go on vacation for a month and is in need of strengthening and stretching exercises at this time. Pt has been in severe pain for a while now. Pt has weakness and decreased balance at this time. Pt is in need of strengthening and neuro reeducation at this time to prevent further decline in functional status.    Plan  From a physical therapy perspective, the patient would benefit from: Skilled Rehab Services    Planned therapy interventions include: Therapeutic activities, Therapeutic exercise, Neuromuscular re-education, Manual therapy, ADLs/IADLs, and Gait training.     Planned modalities to include: Electrical stimulation - attended, Electrical stimulation - passive/unattended, Ultrasound, Thermotherapy (hot pack), and Low-level laser therapy.        Visit Frequency: 2 times Per Week for 6 Weeks.       This plan was discussed with Patient, Therapy assistant, and Caregiver.   Discussion participants: Agreed Upon Plan of Care  Plan details: Pt will be out of town for a month.           Patient's spiritual, cultural, and educational needs considered and patient agreeable to plan of care and goals.           Goals:   Active       Physical Therapy       Physical Therapy Goal       Start:  04/10/25    Expected End:  05/29/25       Goals:  Short Term Goals: 3 weeks   Pt will be compliant with HEP.  Pt will improve quad strength by 1/2 grade to allow for strength to go up and down stairs.   Pt will improve sit <>  30 sec to at least 10 allow for increased functional mobility.     Long Term Goals: 6 weeks   Pt will be independent with HEP to allow for increased functional tasks.  Pt will improve FOTO by 10 % to demonstrate improvement in quality of life.  Pt will improve hip flexor strength by 1/2 grade to allow for normalized body mechanics.              Paradise Rios, PT

## 2025-04-21 ENCOUNTER — PATIENT MESSAGE (OUTPATIENT)
Dept: FAMILY MEDICINE | Facility: CLINIC | Age: 75
End: 2025-04-21
Payer: MEDICARE

## 2025-04-22 ENCOUNTER — TELEPHONE (OUTPATIENT)
Dept: FAMILY MEDICINE | Facility: CLINIC | Age: 75
End: 2025-04-22
Payer: MEDICARE

## 2025-04-30 ENCOUNTER — PATIENT MESSAGE (OUTPATIENT)
Dept: INTERNAL MEDICINE | Facility: CLINIC | Age: 75
End: 2025-04-30
Payer: MEDICARE

## 2025-05-19 ENCOUNTER — CLINICAL SUPPORT (OUTPATIENT)
Dept: REHABILITATION | Facility: HOSPITAL | Age: 75
End: 2025-05-19
Payer: MEDICARE

## 2025-05-19 DIAGNOSIS — Z12.31 ENCOUNTER FOR SCREENING MAMMOGRAM FOR MALIGNANT NEOPLASM OF BREAST: Primary | ICD-10-CM

## 2025-05-19 DIAGNOSIS — Z74.09 IMPAIRED FUNCTIONAL MOBILITY, BALANCE, GAIT, AND ENDURANCE: Primary | ICD-10-CM

## 2025-05-19 PROCEDURE — 97530 THERAPEUTIC ACTIVITIES: CPT | Mod: PN

## 2025-05-19 PROCEDURE — 97110 THERAPEUTIC EXERCISES: CPT | Mod: PN

## 2025-05-19 PROCEDURE — 97140 MANUAL THERAPY 1/> REGIONS: CPT | Mod: PN

## 2025-05-19 NOTE — PROGRESS NOTES
Outpatient Rehab    Physical Therapy Visit    Patient Name: Yaneth Vera  MRN: [id number]  YOB: 1950  Encounter Date: 5/19/2025    Therapy Diagnosis:   Encounter Diagnosis   Name Primary?    Impaired functional mobility, balance, gait, and endurance Yes     Physician: Leonila Norman MD    Physician Orders: Eval and Treat  Medical Diagnosis: Sacrococcygeal disorders, not elsewhere classified    Visit # / Visits Authorized:  1 / 10  Insurance Authorization Period: 4/11/2025 to 12/31/2025  Date of Evaluation: 4/9/2025  Plan of Care Certification: 4/9/2025 to 7/11/2025      PT/PTA:     Number of PTA visits since last PT visit:   Time In: 1330   Time Out: 1430  Total Time (in minutes): 60   Total Billable Time (in minutes): 60    FOTO:  Intake Score:  %  Survey Score 2:  %  Survey Score 3:  %    Precautions:       Subjective   Has been out of town since eval and has not done HEP much. She did a lot of riding, which bothered her condition. Not sure what activities makes her hurt more because sometimes walking is good, sometimes it hurts, etc... Rates pain today as 4/10. Says that DN in has helped a little in the past. She got a steroid shot in hip about 2 months ago; didn't help much. MD then ordered a MRI - said back looked great..  Pain reported as 4/10.      Objective            Treatment:  Therapeutic Exercise  TE 1: Supine hip Abd w/ band (red) x 20  TE 2: Seated hip Add w/ ball x 20  TE 3: Seated hamstring stretch 3 x 15 sec  TE 4: Toe taps x 1 min L-1  TE 5: R sidelying hip Abd x 15; 0#  Manual Therapy  MT 1: DN to R hip/SI joint: Verbal and written consent given. Pt positioned prone w/ feet propped up. Two needles each (.30 x 40 mm) placed in lower lumbar paraspinals lat. to med. at depth up to 40 mm; 1 LTR noted. Two needles each (.30 x 75mm) inserted into left glute max/glute med A/P at depth up to 75 mm; 1 LTR noted. E-stim connected to chino. 1 drop of blood upon removal of 2 needles but  pressure applied and bleeding stopped. Pt reports decreased pain and improved mobility after treatment.  Therapeutic Activity  TA 1: Nu step x 10 min  TA 2: side-stepping 5 steps <-> x 3    Time Entry(in minutes):  E-Stim (Unattended) Time Entry: 10  Manual Therapy Time Entry: 15  Therapeutic Activity Time Entry: 25  Therapeutic Exercise Time Entry: 15    Assessment & Plan   Assessment: Pt needs VC to peform the exercises correctly. No adverse response to treatment or DN.       Patient will continue to benefit from skilled outpatient physical therapy to address the deficits listed in the problem list box on initial evaluation, provide pt/family education and to maximize pt's level of independence in the home and community environment.     Patient's spiritual, cultural, and educational needs considered and patient agreeable to plan of care and goals.           Plan: Cont. POC    Goals:   Active       Physical Therapy       Physical Therapy Goal       Start:  04/10/25    Expected End:  05/29/25       Goals:  Short Term Goals: 3 weeks   Pt will be compliant with HEP.  Pt will improve quad strength by 1/2 grade to allow for strength to go up and down stairs.   Pt will improve sit <>  30 sec to at least 10 allow for increased functional mobility.     Long Term Goals: 6 weeks   Pt will be independent with HEP to allow for increased functional tasks.  Pt will improve FOTO by 10 % to demonstrate improvement in quality of life.  Pt will improve hip flexor strength by 1/2 grade to allow for normalized body mechanics.              Annika Mg, PT

## 2025-05-22 ENCOUNTER — CLINICAL SUPPORT (OUTPATIENT)
Dept: REHABILITATION | Facility: HOSPITAL | Age: 75
End: 2025-05-22
Payer: MEDICARE

## 2025-05-22 DIAGNOSIS — Z74.09 IMPAIRED FUNCTIONAL MOBILITY, BALANCE, GAIT, AND ENDURANCE: Primary | ICD-10-CM

## 2025-05-22 PROCEDURE — 97014 ELECTRIC STIMULATION THERAPY: CPT | Mod: PN

## 2025-05-22 PROCEDURE — 97530 THERAPEUTIC ACTIVITIES: CPT | Mod: PN

## 2025-05-22 PROCEDURE — 97140 MANUAL THERAPY 1/> REGIONS: CPT | Mod: PN

## 2025-05-24 NOTE — PROGRESS NOTES
Outpatient Rehab    Physical Therapy Visit    Patient Name: Yaneth Vera  MRN: [id number]  YOB: 1950  Encounter Date: 5/22/2025    Therapy Diagnosis:   Encounter Diagnosis   Name Primary?    Impaired functional mobility, balance, gait, and endurance Yes     Physician: Leonila Norman MD    Physician Orders: Eval and Treat  Medical Diagnosis: Sacrococcygeal disorders, not elsewhere classified    Visit # / Visits Authorized:  2 / 10  Insurance Authorization Period: 4/11/2025 to 12/31/2025  Date of Evaluation: 4/9/2025  Plan of Care Certification: 4/9/2025 to 7/11/2025      PT/PTA:     Number of PTA visits since last PT visit:   Time In: 1100   Time Out: 1200  Total Time (in minutes): 60   Total Billable Time (in minutes): 30    FOTO:  Intake Score:  %  Survey Score 2:  %  Survey Score 3:  %    Precautions:       Subjective   Patient reports little change in her pain; Stated that her MRI looked pretty good acoording to Dr. Norman;.  Pain reported as 4/10. Lower back, right hip/buttock    Objective            Treatment:  Therapeutic Exercise  TE 1: Supine hip Abd w/ band (red) x 20  TE 2: Seated hip Add w/ ball x 20  TE 3: Seated hamstring stretch 3 x 15 sec  TE 4: Toe taps x 1 min L-1  TE 5: R sidelying hip Abd x 15; 0#  Manual Therapy  MT 1: DN to R hip/SI joint: Verbal and written consent given. Pt positioned prone w/ feet propped up. Two needles each (.30 x 40 mm) placed in lower lumbar paraspinals lat. to med. at depth up to 40 mm; 1 LTR noted. Two needles each (.30 x 75mm) inserted into left glute max/glute med A/P at depth up to 75 mm; 1 LTR noted. E-stim connected to chino. 1 drop of blood upon removal of 2 needles but pressure applied and bleeding stopped. Pt reports decreased pain and improved mobility after treatment.  Therapeutic Activity  TA 1: Nu step x 10 min    Time Entry(in minutes):  E-Stim (Unattended) Time Entry: 15  Manual Therapy Time Entry: 15  Therapeutic Activity Time Entry:  20  Needle Insertions Time Entry: 10    Assessment & Plan   Assessment: No exacerbation of pain with the dry needling; Continues to present with hypertonic lumbar fascia; restrictions in hip and spine mobility; Recommended aquatic exercises via stay fit as often as possible while she is here in .  Evaluation/Treatment Tolerance: Patient tolerated treatment well    The patient will continue to benefit from skilled outpatient physical therapy in order to address the deficits listed in the problem list on the initial evaluation, provide patient and family education, and maximize the patients level of independence in the home and community environments.     The patient's spiritual, cultural, and educational needs were considered, and the patient is agreeable to the plan of care and goals.           Plan: Continue with SKilled physical therapy - has one more visit before returning to [place] for several weeks.  Will do as much on Stay-Fit in [place] as she can.    Goals:   Active       Physical Therapy       Physical Therapy Goal (Progressing)       Start:  04/10/25    Expected End:  05/29/25       Goals:  Short Term Goals: 3 weeks   Pt will be compliant with HEP.  Pt will improve quad strength by 1/2 grade to allow for strength to go up and down stairs.   Pt will improve sit <>  30 sec to at least 10 allow for increased functional mobility.     Long Term Goals: 6 weeks   Pt will be independent with HEP to allow for increased functional tasks.  Pt will improve FOTO by 10 % to demonstrate improvement in quality of life.  Pt will improve hip flexor strength by 1/2 grade to allow for normalized body mechanics.              Teresa Pabon, PT

## 2025-05-28 ENCOUNTER — CLINICAL SUPPORT (OUTPATIENT)
Dept: REHABILITATION | Facility: HOSPITAL | Age: 75
End: 2025-05-28
Payer: MEDICARE

## 2025-05-28 DIAGNOSIS — Z74.09 IMPAIRED FUNCTIONAL MOBILITY, BALANCE, GAIT, AND ENDURANCE: Primary | ICD-10-CM

## 2025-05-28 PROCEDURE — 97110 THERAPEUTIC EXERCISES: CPT | Mod: PN,CQ

## 2025-05-28 PROCEDURE — 97530 THERAPEUTIC ACTIVITIES: CPT | Mod: PN,CQ

## 2025-05-28 NOTE — PROGRESS NOTES
"  Outpatient Rehab    Physical Therapy Visit    Patient Name: Yaneth Vera  MRN: [id number]  YOB: 1950  Encounter Date: 5/28/2025    Therapy Diagnosis:   Encounter Diagnosis   Name Primary?    Impaired functional mobility, balance, gait, and endurance Yes     Physician: Leonila Norman MD    Physician Orders: Eval and Treat  Medical Diagnosis: Sacrococcygeal disorders, not elsewhere classified    Visit # / Visits Authorized:  3 / 10  Insurance Authorization Period: 4/11/2025 to 12/31/2025  Date of Evaluation: 4/9/2025  Plan of Care Certification: 4/9/2025 to 7/11/2025      PT/PTA: PTA   Number of PTA visits since last PT visit:1  Time In:   12:30 PM  Time Out:   1:30 PM  Total Time (in minutes):   60 Minutes  Total Billable Time (in minutes):   53 Minutes    FOTO:  Intake Score:  %  Survey Score 2:  %  Survey Score 3:  %    Precautions:       Subjective   Patient reports continued left hip/buttocks pain.  Pain reported as 4/10. Lower back, left hip/buttock    Objective            Treatment:  Therapeutic Exercise  TE 1: Supine hip Abd w/ band (red) x 20  TE 2: Ball Squeezes x 2 min  TE 3: Supine hamstring stretch 3 x 30 sec  TE 4: Toe taps on 6" step x 2 min  TE 5: FSU on 4" step x 10  TE 6: S/L Hip Abd x 15  TE 7: Standing Hip Flex/Abd/Ext x 10  Therapeutic Activity  TA 1: Nu step level 4 x 15 min  TA 2: Sit to Stand x 10  TA 3: DKC w/ PB x 2 min  TA 4: LTR w/ PB x 2 min  TA 5: Supine Knee Fallouts x 15    Time Entry(in minutes):  Therapeutic Activity Time Entry: 30  Therapeutic Exercise Time Entry: 23    Assessment & Plan   Assessment: Patient did well with exercises; no exacerbation of pain; will continue with combination of DN and exercise       The patient will continue to benefit from skilled outpatient physical therapy in order to address the deficits listed in the problem list on the initial evaluation, provide patient and family education, and maximize the patients level of independence in " the home and community environments.     The patient's spiritual, cultural, and educational needs were considered, and the patient is agreeable to the plan of care and goals.           Plan: Continue with Skilled physical therapy - has one more visit before returning to [place] for several weeks.  Will do as much on Stay-Fit in pool as she can.    Goals:   Active       Physical Therapy       Physical Therapy Goal (Progressing)       Start:  04/10/25    Expected End:  05/29/25       Goals:  Short Term Goals: 3 weeks   Pt will be compliant with HEP.  Pt will improve quad strength by 1/2 grade to allow for strength to go up and down stairs.   Pt will improve sit <>  30 sec to at least 10 allow for increased functional mobility.     Long Term Goals: 6 weeks   Pt will be independent with HEP to allow for increased functional tasks.  Pt will improve FOTO by 10 % to demonstrate improvement in quality of life.  Pt will improve hip flexor strength by 1/2 grade to allow for normalized body mechanics.              Jonathan Favre, PTA

## 2025-06-06 ENCOUNTER — HOSPITAL ENCOUNTER (OUTPATIENT)
Dept: RADIOLOGY | Facility: HOSPITAL | Age: 75
Discharge: HOME | End: 2025-06-06
Attending: OBSTETRICS & GYNECOLOGY
Payer: MEDICARE

## 2025-06-06 DIAGNOSIS — Z12.31 ENCOUNTER FOR SCREENING MAMMOGRAM FOR MALIGNANT NEOPLASM OF BREAST: ICD-10-CM

## 2025-06-06 PROCEDURE — 77067 SCR MAMMO BI INCL CAD: CPT | Mod: TC,PO

## 2025-06-06 PROCEDURE — 77063 BREAST TOMOSYNTHESIS BI: CPT | Mod: 26,,, | Performed by: RADIOLOGY

## 2025-06-06 PROCEDURE — 77067 SCR MAMMO BI INCL CAD: CPT | Mod: 26,,, | Performed by: RADIOLOGY

## 2025-07-09 ENCOUNTER — CLINICAL SUPPORT (OUTPATIENT)
Dept: REHABILITATION | Facility: HOSPITAL | Age: 75
End: 2025-07-09
Payer: MEDICARE

## 2025-07-09 DIAGNOSIS — Z74.09 IMPAIRED FUNCTIONAL MOBILITY, BALANCE, GAIT, AND ENDURANCE: Primary | ICD-10-CM

## 2025-07-09 PROCEDURE — 97110 THERAPEUTIC EXERCISES: CPT | Mod: PN

## 2025-07-09 PROCEDURE — 97140 MANUAL THERAPY 1/> REGIONS: CPT | Mod: PN

## 2025-07-09 PROCEDURE — 97530 THERAPEUTIC ACTIVITIES: CPT | Mod: PN

## 2025-07-10 NOTE — PROGRESS NOTES
Outpatient Rehab    Physical Therapy Progress Note    Patient Name: Yaneth Vera  MRN: [id number]  YOB: 1950  Encounter Date: 7/9/2025    Therapy Diagnosis:   Encounter Diagnosis   Name Primary?    Impaired functional mobility, balance, gait, and endurance Yes     Physician: Leonila Norman MD    Physician Orders: Eval and Treat  Medical Diagnosis: Sacrococcygeal disorders, not elsewhere classified  Surgical Diagnosis: Not applicable for this Episode   Surgical Date: Not applicable for this Episode  Days Since Last Surgery: Not applicable for this Episode    Visit # / Visits Authorized:  4 / 10  Insurance Authorization Period: 4/11/2025 to 12/31/2025  Date of Evaluation: 4/9/2025  Plan of Care Certification: 4/9/2025 to 7/11/2025      PT/PTA: PT   Number of PTA visits since last PT visit:0  Time In: 0700   Time Out: 0800  Total Time (in minutes): 60   Total Billable Time (in minutes): 55    FOTO:  Intake Score:  %  Survey Score 2:  %  Survey Score 3:  %    Precautions:       Subjective   Patient has not been here for over 30 days - has been busy moving to [place]; She reports still having lower back pain with left buttock and hip referred symptoms..  Pain reported as 4/10. Lower back, left hip/buttock    Objective        Patient presents with a Forward head position. Increased lumbar lordosis is observed.   Shoulders are Rounded. Pelvic tilt observed: Anterior         Bilaterally, hips are: Externally Rotated  Bilateral ankles/feet exhibit: Toe Out         Hip Range of Motion    Pt strength is grossly 4/5                  Knee Strength    Right Strength Right Pain Left Strength Left  Pain   Flexion (S2) 4-   4+     Prone Flexion           Extension (L3) 4-   4+             Treatment:  Manual Therapy  MT 1: S/L on left - segmental lumbar flexion mobilization with blocking of segment above for max facet motion - left side  MT 2: Prone - SIJ mobilization on left, piriformis STM with IASTM to parapsinals and  piriformis  MT 3: Prone - FDN to lumbar spine into left SIJ and piriformis:  Proper head/trunk/ankle support given. LF/ES using 60 mm needles 1/5 fb lateral to L4, L5, S1 at 45 deg angle to tissue tightness with clockwise winding of needle for increased tissue grasp; 75 mm needle to left SIJ and into left piriformis - depth to tissue tightness with clockwise winding for tissue grasp.  Intensity of stimulation adjusted to patient tolerance with good rhythmical vibration of tissue noted.  Needles left insitu x 12 mins. Removed without adverse effects.  Therapeutic Activity  TA 1: Nu-Step Level 54 x 15 mins  TA 2: Sit to stand x 10  TA 3: DKC with PB x 2 mins  TA 4: LTR  with PB x 2 mins  TA 5: SUpine Knee fallouts x 15  TA 6: Seated: hip hinging with forward flex of trunk x 10  TA 7: SUpine - hamstring stretch with strap - 3 way x 30 sec each - BLE    Time Entry(in minutes):  E-Stim (Unattended) Time Entry: 12  Manual Therapy Time Entry: 25  Therapeutic Activity Time Entry: 15  Therapeutic Exercise Time Entry: 15    Assessment & Plan   Assessment: Patient continues to have lower lumbar/Left SIJ/piriformis pain, hypertonicity of tissue noted in lumbar spine and lateral trunk.  Have not been able to be consistent with therapy to this point secondary to patient's schedule to this point.  Encouraged Yaneth to stretch and move as much as possible during the day; She will be in town for the next several weeks so will get her scheduled for 1-2/x each week to maximize therapy benefit.   Evaluation/Treatment Tolerance: Patient tolerated treatment well    The patient will continue to benefit from skilled outpatient physical therapy in order to address the deficits listed in the problem list on the initial evaluation, provide patient and family education, and maximize the patients level of independence in the home and community environments.     The patient's spiritual, cultural, and educational needs were considered, and the  patient is agreeable to the plan of care and goals.           Plan: Continue with Skilled physical therapy 1-2x/week for next 3-4 weeks that patient will be in town.  Aquatics independently, manual tx with land based activities for therapy visits.    Goals:   Active       Physical Therapy       Physical Therapy Goal (Ongoing)       Start:  04/10/25    Expected End:  08/01/25       Goals:  Short Term Goals: 3 weeks   Pt will be compliant with HEP.  Pt will improve quad strength by 1/2 grade to allow for strength to go up and down stairs.   Pt will improve sit <>  30 sec to at least 10 allow for increased functional mobility.     Long Term Goals: 6 weeks   Pt will be independent with HEP to allow for increased functional tasks.  Pt will improve FOTO by 10 % to demonstrate improvement in quality of life.  Pt will improve hip flexor strength by 1/2 grade to allow for normalized body mechanics.              Teresa Pabon, PT

## 2025-07-14 ENCOUNTER — CLINICAL SUPPORT (OUTPATIENT)
Dept: REHABILITATION | Facility: HOSPITAL | Age: 75
End: 2025-07-14
Payer: MEDICARE

## 2025-07-14 DIAGNOSIS — Z74.09 IMPAIRED FUNCTIONAL MOBILITY, BALANCE, GAIT, AND ENDURANCE: Primary | ICD-10-CM

## 2025-07-14 PROCEDURE — 97530 THERAPEUTIC ACTIVITIES: CPT | Mod: PN,CQ

## 2025-07-14 PROCEDURE — 97110 THERAPEUTIC EXERCISES: CPT | Mod: PN,CQ

## 2025-07-14 NOTE — PROGRESS NOTES
"  Outpatient Rehab    Physical Therapy Visit    Patient Name: Yaneth Vera  MRN: [id number]  YOB: 1950  Encounter Date: 7/14/2025    Therapy Diagnosis:   Encounter Diagnosis   Name Primary?    Impaired functional mobility, balance, gait, and endurance Yes     Physician: Leonila Norman MD    Physician Orders: Eval and Treat  Medical Diagnosis: Sacrococcygeal disorders, not elsewhere classified  Surgical Diagnosis: Not applicable for this Episode   Surgical Date: Not applicable for this Episode  Days Since Last Surgery: Not applicable for this Episode    Visit # / Visits Authorized:  5 / 10  Insurance Authorization Period: 4/11/2025 to 12/31/2025  Date of Evaluation: 4/9/2025  Plan of Care Certification: 4/9/2025 to 7/11/2025      PT/PTA: PTA   Number of PTA visits since last PT visit:1  Time In:   8:00 AM  Time Out:   8:50 AM  Total Time (in minutes):   50 Minutes  Total Billable Time (in minutes):   45 Minutes    FOTO:  Intake Score: Not applicable for this Episode%  Survey Score 2: Not applicable for this Episode%  Survey Score 3: Not applicable for this Episode%    Precautions:         Subjective   No new c/o's.  Pain reported as 4/10. Lower back, left hip/buttock    Objective            Treatment:  Therapeutic Exercise  TE 1: Supine hip abd w/ RTB x 20  TE 2: Ball Squeezes x 2 min  TE 3: Supine H/S stretch w/ strap 3 x 30 sec  TE 4: Toe Taps on 6" step x 2 min  TE 5: FSU on 4" step x 15  TE 6: Heel Cord stretch on wedge 3 x 30 sec  TE 7: Standing Hip Flex/Abd/Ext x 15  TE 8: Wall Squats w/ ball x 15  Therapeutic Activity  TA 1: Nu-Step Level 5 x 15 mins  TA 2: Sit to stand x 10  TA 3: DKC with PB x 2 mins  TA 4: LTR  with PB x 2 mins  TA 5: Supine Knee fallouts x 15  TA 6: Seated: hip hinging with forward flex of trunk x 10    Time Entry(in minutes):  Therapeutic Activity Time Entry: 25  Therapeutic Exercise Time Entry: 20    Assessment & Plan   Assessment: Patient did pretty well with exercises; " no exacerbation of pain; hopefully now that she has moved, she can be more consistent with therapy and achieve better results       The patient will continue to benefit from skilled outpatient physical therapy in order to address the deficits listed in the problem list on the initial evaluation, provide patient and family education, and maximize the patients level of independence in the home and community environments.     The patient's spiritual, cultural, and educational needs were considered, and the patient is agreeable to the plan of care and goals.           Plan: Continue with Skilled physical therapy 1-2x/week for next 3-4 weeks that patient will be in town.  Aquatics independently, manual tx with land based activities for therapy visits.    Goals:   Active       Physical Therapy       Physical Therapy Goal (Progressing)       Start:  04/10/25    Expected End:  08/01/25       Goals:  Short Term Goals: 3 weeks   Pt will be compliant with HEP.  Pt will improve quad strength by 1/2 grade to allow for strength to go up and down stairs.   Pt will improve sit <>  30 sec to at least 10 allow for increased functional mobility.     Long Term Goals: 6 weeks   Pt will be independent with HEP to allow for increased functional tasks.  Pt will improve FOTO by 10 % to demonstrate improvement in quality of life.  Pt will improve hip flexor strength by 1/2 grade to allow for normalized body mechanics.              Jonathan Favre, PTA

## 2025-07-17 ENCOUNTER — CLINICAL SUPPORT (OUTPATIENT)
Dept: REHABILITATION | Facility: HOSPITAL | Age: 75
End: 2025-07-17
Payer: MEDICARE

## 2025-07-17 DIAGNOSIS — Z74.09 IMPAIRED FUNCTIONAL MOBILITY, BALANCE, GAIT, AND ENDURANCE: Primary | ICD-10-CM

## 2025-07-17 PROCEDURE — 97140 MANUAL THERAPY 1/> REGIONS: CPT | Mod: PN

## 2025-07-17 PROCEDURE — 97530 THERAPEUTIC ACTIVITIES: CPT | Mod: PN

## 2025-07-18 NOTE — PROGRESS NOTES
Outpatient Rehab    Physical Therapy Progress Note : Updated Plan of Care    Patient Name: Yaneth Vera  MRN: [id number]  YOB: 1950  Encounter Date: 7/17/2025    Therapy Diagnosis:   Encounter Diagnosis   Name Primary?    Impaired functional mobility, balance, gait, and endurance Yes     Physician: Leonila Norman MD    Physician Orders: Eval and Treat  Medical Diagnosis: Sacrococcygeal disorders, not elsewhere classified  Surgical Diagnosis: Not applicable for this Episode   Surgical Date: Not applicable for this Episode  Days Since Last Surgery: Not applicable for this Episode    Visit # / Visits Authorized:  6 / 10  Insurance Authorization Period: 4/11/2025 to 12/31/2025  Date of Evaluation: 4/9/2025   Plan of Care Certification: 4/9/2025 to 7/11/2025      PT/PTA: PT   Number of PTA visits since last PT visit:0  Time In: 0900   Time Out: 1000  Total Time (in minutes): 60   Total Billable Time (in minutes): 35  split billing - time reflects direct patient care     FOTO:  Intake Score (%): Not applicable for this Episode  Survey Score 2 (%): Not applicable for this Episode  Survey Score 3 (%): Not applicable for this Episode    Precautions:       Subjective   I'm about the same; patient having continued pain; tired of hurting;.  Pain reported as 5/10. Lower back, left hip/buttock    Objective       Little improvement in pain or functional mobility; Has only completed 6 visits since evaluation on 4/9/2025 due to out of town activities.      Posture  Patient presents with a Forward head position. Increased lumbar lordosis is observed.   Shoulders are Rounded. Pelvic tilt observed: Anterior         Bilaterally, hips are: Externally Rotated  Bilateral ankles/feet exhibit: Toe Out         Hip Range of Motion    Pt strength is grossly 4/5       Movement: difficulty with supine > sit; supine to right/left rotation and/or prone positions.  Posterior lumbar/paraspinal tissue hypertonic; piriformis  tightness, hamstring tightness; Significant tenderness at left SIJ, Left PSIS extending into left buttock.     Gait: wide JOSE; lateral trunk movement with increased arm swing to compensate for forward movement with LE's; Endurance limited - walking tolerance Less than 2 blocks.             Knee Strength    Right Strength Right Pain Left Strength Left  Pain   Flexion (S2) 4-   4+     Prone Flexion           Extension (L3) 4-   4+                    Hip Special Tests  90/90 Hamstring Test  Positive: Right and Left     Straight Leg Raise Test  Positive: Left  Negative: Right    Treatment:  Therapeutic Exercise  TE 1: Supine hip abd w/ RTB x 20  TE 3: Supine H/S stretch w/ strap 3 x 30 sec  Manual Therapy  MT 1: S/L on left - segmental lumbar flexion mobilization with blocking of segment above for max facet motion - left side  MT 2: Prone SIJ mobilization on left - Cupping performed over left side of lumbar parapsinals moving distally into Left PSIS, Left SIJ - moderate suction applied - patient with intial increase in discomfort as tissue very tight -  Therapeutic Activity  TA 1: Trial - recumbent bike - Level 5 x 10 mins  TA 2: Sit to stand x 10  TA 3: DKC with PB x 2 mins  TA 4: LTR  with PB x 2 mins  TA 5: Supine Knee fallouts x 15  TA 6: Seated: hip hinging with forward flex of trunk x 10  TA 7: SUpine - hamstring stretch with strap - 3 way x 30 sec each - BLE    Time Entry(in minutes):  Manual Therapy Time Entry: 20  Therapeutic Activity Time Entry: 15    Assessment & Plan   Assessment  Yaneth continues to have a difficult time with movement and pain; Have emphasized need to be more active on a daily basis to make any lasting improvements in her function.  She is sporadic at best with exercise if not in therapy,  is very supportive and encouraging, but can only do so much.  Patient is able to exercise without pain while in pool so have encouraged that on a more frequent basis.  Trial of cupping today to  address tissue tightness, Will see patient back next week.  Update POC secondary to expiration - patient was out of town for majority of POC time - only a few visits accomplished.   Evaluation/Treatment Tolerance: Patient limited by pain    The patient will continue to benefit from skilled outpatient physical therapy in order to address the deficits listed in the problem list on the initial evaluation, provide patient and family education, and maximize the patients level of independence in the home and community environments.     The patient's spiritual, cultural, and educational needs were considered, and the patient is agreeable to the plan of care and goals.           Goals:   Start:  04/10/25    Expected End:  09/26/2025      Goals:  Short Term Goals: 3 weeks   Pt will be compliant with HEP.  Pt will improve quad strength by 1/2 grade to allow for strength to go up and down stairs.   Pt will improve sit <>  30 sec to at least 10 allow for increased functional mobility.      Long Term Goals: 6 weeks   Pt will be independent with HEP to allow for increased functional tasks.  Pt will improve FOTO by 10 % to demonstrate improvement in quality of life.  Pt will improve hip flexor strength by 1/2 grade to allow for normalized body mechanics.                 Teresa Pabon, PT

## 2025-07-21 ENCOUNTER — CLINICAL SUPPORT (OUTPATIENT)
Dept: REHABILITATION | Facility: HOSPITAL | Age: 75
End: 2025-07-21
Payer: MEDICARE

## 2025-07-21 DIAGNOSIS — M53.3 SI (SACROILIAC) JOINT DYSFUNCTION: ICD-10-CM

## 2025-07-21 DIAGNOSIS — Z74.09 IMPAIRED FUNCTIONAL MOBILITY, BALANCE, GAIT, AND ENDURANCE: Primary | ICD-10-CM

## 2025-07-21 PROCEDURE — 97530 THERAPEUTIC ACTIVITIES: CPT | Mod: PN

## 2025-07-21 PROCEDURE — 97140 MANUAL THERAPY 1/> REGIONS: CPT | Mod: PN

## 2025-07-21 PROCEDURE — 97112 NEUROMUSCULAR REEDUCATION: CPT | Mod: PN

## 2025-07-22 NOTE — PROGRESS NOTES
Outpatient Rehab    Physical Therapy Visit    Patient Name: Yaneth Vera  MRN: [id number]  YOB: 1950  Encounter Date: 7/21/2025    Therapy Diagnosis:   Encounter Diagnoses   Name Primary?    Impaired functional mobility, balance, gait, and endurance Yes    SI (sacroiliac) joint dysfunction      Physician: Leonila Norman MD    Physician Orders: Eval and Treat  Medical Diagnosis: Sacrococcygeal disorders, not elsewhere classified  Surgical Diagnosis: Not applicable for this Episode   Surgical Date: Not applicable for this Episode  Days Since Last Surgery: Not applicable for this Episode    Visit # / Visits Authorized:  7 / 10  Insurance Authorization Period: 4/11/2025 to 12/31/2025  Date of Evaluation: 4/9/2025  Plan of Care Certification: 7/11/2025 to 10/11/2025      PT/PTA: PT   Number of PTA visits since last PT visit:0  Time In: 1000   Time Out: 1100  Total Time (in minutes): 60   Total Billable Time (in minutes): 60    FOTO:  Intake Score (%): Not applicable for this Episode  Survey Score 2 (%): Not applicable for this Episode  Survey Score 3 (%): Not applicable for this Episode    Precautions:         Subjective   Patient in better spirits today; still having pain, but indicated that it wasn't as bad today.  Pain reported as 4/10. Lower back, left hip/buttock    Objective            Treatment:  Manual Therapy  MT 1: S/L on left - segmental lumbar flexion mobilization with blocking of segment above for max facet motion - left side  MT 3: Prone - FDN to lumbar spine into left SIJ and piriformis:  Proper head/trunk/ankle support given. LF/ES using 60 mm needles 1/5 fb lateral to L4, L5, S1 at 45 deg angle to tissue tightness with clockwise winding of needle for increased tissue grasp; 75 mm needle to left SIJ and into left piriformis - depth to tissue tightness with clockwise winding for tissue grasp.  Intensity of stimulation adjusted to patient tolerance with good rhythmical vibration of tissue  "noted.  Needles left insitu x 12 mins. Removed without adverse effects.  MT 4: IASTM to lumbar/left SIJ following removal of needles to address tissue tightness.  Balance/Neuromuscular Re-Education  NMR 1: Supine: 3 way hamstring stretch with strap - 30 sec holds in each position  NMR 2: Piriformis stretch on left/right - 30 sec hold x 2  NMR 3: Supine: activation of TrAb's with progression to posterior pelvic tilt - 5" hold x 10 - focus on breathing  NMR 4: Supine: SIJ mobilization - alternate push/contract against knees to stabilize SI joint - x 10  NMR 5: Bridging: band around thighs ( pink) x 10  Therapeutic Activity  TA 1: Recumbent bike - Level 4 x 15 mins  TA 2: Sit to stand x 10  TA 3: DKC with PB x 2 mins  TA 4: LTR  - feet together - pause on each side for stretch x 2 mins  TA 5: Supine Knee fallouts x 15  TA 6: Seated: hip hinging with forward flex of trunk x 10    Time Entry(in minutes):  Manual Therapy Time Entry: 20  Neuromuscular Re-Education Time Entry: 15  Therapeutic Activity Time Entry: 25    Assessment & Plan   Assessment: Better tolerance of activity today; Performed FDN to SIJ on left with LF-ES.  Reduction in pain noted after treatment ( 5 > 3) to allow her to perform exercises.  Added in SIJ stabilization today; stretching as usual.  Encouraged Yaneth to come in on non-therapy days for aquatics - needs to move and do something daily to influence her situation and reduce pain.   Evaluation/Treatment Tolerance: Patient tolerated treatment well    The patient will continue to benefit from skilled outpatient physical therapy in order to address the deficits listed in the problem list on the initial evaluation, provide patient and family education, and maximize the patients level of independence in the home and community environments.     The patient's spiritual, cultural, and educational needs were considered, and the patient is agreeable to the plan of care and goals.           Plan: Continue " with Skilled physical therapy 1-2x/week for next 3-4 weeks that patient will be in town.  Aquatics independently, manual tx with land based activities for therapy visits.    Goals:   Active       Physical Therapy       Physical Therapy Goal (Progressing)       Start:  04/10/25    Expected End:  09/26/25       Goals:  Short Term Goals: 3 weeks   Pt will be compliant with HEP.  Pt will improve quad strength by 1/2 grade to allow for strength to go up and down stairs.   Pt will improve sit <>  30 sec to at least 10 allow for increased functional mobility.     Long Term Goals: 6 weeks   Pt will be independent with HEP to allow for increased functional tasks.  Pt will improve FOTO by 10 % to demonstrate improvement in quality of life.  Pt will improve hip flexor strength by 1/2 grade to allow for normalized body mechanics.              Teresa Pabon, PT

## 2025-07-23 ENCOUNTER — CLINICAL SUPPORT (OUTPATIENT)
Dept: REHABILITATION | Facility: HOSPITAL | Age: 75
End: 2025-07-23
Payer: MEDICARE

## 2025-07-23 DIAGNOSIS — M53.3 SI (SACROILIAC) JOINT DYSFUNCTION: Primary | ICD-10-CM

## 2025-07-23 DIAGNOSIS — Z74.09 IMPAIRED FUNCTIONAL MOBILITY, BALANCE, GAIT, AND ENDURANCE: ICD-10-CM

## 2025-07-23 PROCEDURE — 97530 THERAPEUTIC ACTIVITIES: CPT | Mod: PN

## 2025-07-23 PROCEDURE — 97140 MANUAL THERAPY 1/> REGIONS: CPT | Mod: PN

## 2025-07-24 NOTE — PROGRESS NOTES
"  Outpatient Rehab    Physical Therapy Visit    Patient Name: Yaneth Vera  MRN: [id number]  YOB: 1950  Encounter Date: 7/23/2025    Therapy Diagnosis:   Encounter Diagnoses   Name Primary?    SI (sacroiliac) joint dysfunction Yes    Impaired functional mobility, balance, gait, and endurance      Physician: Leonila Norman MD    Physician Orders: Eval and Treat  Medical Diagnosis: Sacrococcygeal disorders, not elsewhere classified  Surgical Diagnosis: Not applicable for this Episode   Surgical Date: Not applicable for this Episode  Days Since Last Surgery: Not applicable for this Episode    Visit # / Visits Authorized:  8 / 10  Insurance Authorization Period: 4/11/2025 to 12/31/2025  Date of Evaluation: 4/9/2025  Plan of Care Certification: 7/11/2025 to 10/11/2025      PT/PTA: PT   Number of PTA visits since last PT visit:0  Time In: 1000   Time Out: 1110  Total Time (in minutes): 70   Total Billable Time (in minutes): 40    FOTO:  Intake Score (%): Not applicable for this Episode  Survey Score 2 (%): Not applicable for this Episode  Survey Score 3 (%): Not applicable for this Episode    Precautions:         Subjective   Patient moving better today; noting some improvement in her pain; Has been in to exercise everyday so far this week..  Pain reported as 4/10. Lower back, left hip/buttock    Objective            Treatment:  Therapeutic Exercise  TE 1: Supine hip abd w/ RTB x 20  TE 2: Ball Squeezes x 2 min  TE 4: Toe Taps on 6" step x 2 min  TE 5: FSU on 4" step x 15  TE 6: Heel Cord stretch on wedge 3 x 30 sec  TE 7: Standing Hip Flex/Abd/Ext x 15  TE 8: Wall Squats w/ ball x 15  Manual Therapy  MT 3: Prone - FDN to lumbar spine into left SIJ and piriformis:  Proper head/trunk/ankle support given. LF/ES using 60 mm needles 1/5 fb lateral to L4, L5, S1 at 45 deg angle to tissue tightness with clockwise winding of needle for increased tissue grasp; 75 mm needle to left SIJ and into left piriformis - " "depth to tissue tightness with clockwise winding for tissue grasp.  Intensity of stimulation adjusted to patient tolerance with good rhythmical vibration of tissue noted.  Needles left insitu x 12 mins. Removed without adverse effects.  MT 4: IASTM to lumbar/left SIJ following removal of needles to address tissue tightness.  Balance/Neuromuscular Re-Education  NMR 1: Supine: 3 way hamstring stretch with strap - 30 sec holds in each position  NMR 2: Piriformis stretch on left/right - 30 sec hold x 2  NMR 3: Supine: activation of TrAb's with progression to posterior pelvic tilt - 5" hold x 10 - focus on breathing  NMR 4: Supine: SIJ mobilization - alternate push/contract against knees to stabilize SI joint - x 10  NMR 5: Bridging: band around thighs ( pink) x 10  Therapeutic Activity  TA 2: Sit to stand x 10  TA 3: DKC with PB x 2 mins  TA 4: LTR  - feet together - pause on each side for stretch x 2 mins  TA 5: Supine Knee fallouts x 15  TA 6: Seated: hip hinging with forward flex of trunk x 10    Time Entry(in minutes):  Manual Therapy Time Entry: 15  Therapeutic Activity Time Entry: 25    Assessment & Plan   Assessment: Good response to FDN - reported couple day decreased pain; still very tender to palpation over left SIJ, piriformis; SS, but with much better tolerance and performance of exercises noted.   Evaluation/Treatment Tolerance: Patient tolerated treatment well    The patient will continue to benefit from skilled outpatient physical therapy in order to address the deficits listed in the problem list on the initial evaluation, provide patient and family education, and maximize the patients level of independence in the home and community environments.     The patient's spiritual, cultural, and educational needs were considered, and the patient is agreeable to the plan of care and goals.           Plan: Continue with Skilled physical therapy 1-2x/week for next 3-4 weeks that patient will be in town.  Aquatics " independently, manual tx with land based activities for therapy visits.    Goals:   Active       Physical Therapy       Physical Therapy Goal (Progressing)       Start:  04/10/25    Expected End:  09/26/25       Goals:  Short Term Goals: 3 weeks   Pt will be compliant with HEP.  Pt will improve quad strength by 1/2 grade to allow for strength to go up and down stairs.   Pt will improve sit <>  30 sec to at least 10 allow for increased functional mobility.     Long Term Goals: 6 weeks   Pt will be independent with HEP to allow for increased functional tasks.  Pt will improve FOTO by 10 % to demonstrate improvement in quality of life.  Pt will improve hip flexor strength by 1/2 grade to allow for normalized body mechanics.              Teresa Pabon, PT

## 2025-07-25 ENCOUNTER — OFFICE VISIT (OUTPATIENT)
Dept: FAMILY MEDICINE | Facility: CLINIC | Age: 75
End: 2025-07-25
Payer: MEDICARE

## 2025-07-25 VITALS
WEIGHT: 242.94 LBS | OXYGEN SATURATION: 98 % | HEIGHT: 65 IN | HEART RATE: 60 BPM | BODY MASS INDEX: 40.48 KG/M2 | SYSTOLIC BLOOD PRESSURE: 134 MMHG | DIASTOLIC BLOOD PRESSURE: 72 MMHG

## 2025-07-25 DIAGNOSIS — E66.01 SEVERE OBESITY (BMI >= 40): ICD-10-CM

## 2025-07-25 DIAGNOSIS — K21.9 GASTROESOPHAGEAL REFLUX DISEASE, UNSPECIFIED WHETHER ESOPHAGITIS PRESENT: ICD-10-CM

## 2025-07-25 DIAGNOSIS — Z79.4 TYPE 2 DIABETES MELLITUS WITHOUT COMPLICATION, WITH LONG-TERM CURRENT USE OF INSULIN: Primary | ICD-10-CM

## 2025-07-25 DIAGNOSIS — E11.9 TYPE 2 DIABETES MELLITUS WITHOUT COMPLICATION, WITH LONG-TERM CURRENT USE OF INSULIN: Primary | ICD-10-CM

## 2025-07-25 PROCEDURE — 99999 PR PBB SHADOW E&M-EST. PATIENT-LVL IV: CPT | Mod: PBBFAC,,, | Performed by: FAMILY MEDICINE

## 2025-07-25 PROCEDURE — 99214 OFFICE O/P EST MOD 30 MIN: CPT | Mod: PBBFAC,PN | Performed by: FAMILY MEDICINE

## 2025-07-25 RX ORDER — OMEPRAZOLE 20 MG/1
20 CAPSULE, DELAYED RELEASE ORAL DAILY
Qty: 90 CAPSULE | Refills: 3 | Status: SHIPPED | OUTPATIENT
Start: 2025-07-25

## 2025-07-25 NOTE — PROGRESS NOTES
Subjective     Patient ID: Yaneth Vera is a 75 y.o. female.    Chief Complaint: Follow-up (Pt is here for her follow up on DM)    Diabetes and obesity: Working on diet and exercise modifications states fasting sugars are running between 150 to 170s.  Patient did not tolerate glipizide and metformin in past as  to cardiac issues.  Patient not interested in injection diabetic medications, but is open to medication if needed.  Has never had to take a cholesterol medication    GERD: Stable needs refill on PPI only taking PRN      Review of Systems   Constitutional:  Negative for activity change, appetite change, chills, diaphoresis, fatigue, fever and unexpected weight change.   Respiratory:  Negative for cough, choking and chest tightness.    Cardiovascular:  Negative for chest pain, palpitations, leg swelling and claudication.   Gastrointestinal:  Negative for abdominal pain, change in bowel habit, constipation, diarrhea, nausea and vomiting.   Psychiatric/Behavioral:  Negative for dysphoric mood, self-injury, sleep disturbance and suicidal ideas. The patient is not nervous/anxious.           Objective     Physical Exam  Vitals reviewed.   Constitutional:       General: She is not in acute distress.     Appearance: Normal appearance. She is obese. She is not ill-appearing or toxic-appearing.   Cardiovascular:      Rate and Rhythm: Normal rate and regular rhythm.      Heart sounds: Normal heart sounds.   Pulmonary:      Effort: Pulmonary effort is normal.      Breath sounds: Normal breath sounds.   Abdominal:      General: Abdomen is flat. Bowel sounds are normal.      Palpations: Abdomen is soft.      Tenderness: There is no abdominal tenderness.   Neurological:      General: No focal deficit present.      Mental Status: She is alert and oriented to person, place, and time.   Psychiatric:         Mood and Affect: Mood normal.         Behavior: Behavior normal.            Assessment and Plan     1. Type 2  diabetes mellitus without complication, with long-term current use of insulin  -     Hemoglobin A1C; Future; Expected date: 07/25/2025  -     Lipid Panel; Future; Expected date: 07/25/2025  -     Microalbumin/Creatinine Ratio, Urine; Future; Expected date: 07/25/2025    2. Gastroesophageal reflux disease, unspecified whether esophagitis present  -     omeprazole (PRILOSEC) 20 MG capsule; Take 1 capsule (20 mg total) by mouth once daily.  Dispense: 90 capsule; Refill: 3    3. Severe obesity (BMI >= 40)      Diabetes and obesity: Uncontrolled.  We will check labs.  We will start Farxiga if hemoglobin A1c remains significantly elevated.    GERD: Stable we will refill PPI  Risks, benefits, and side effects were discussed with the patient. All questions were answered to the fullest satisfaction of the patient, and pt verbalized understanding and agreement to treatment plan. Pt was to call with any new or worsening symptoms, or present to the ER.  Return to clinic three-months or sooner if needed       Jazzy Gallagher MD  Family Medicine Physician   [facility]     This note was created using M*Modal voice recognition software that occasionally may misinterpret phrases or words.

## 2025-07-28 ENCOUNTER — CLINICAL SUPPORT (OUTPATIENT)
Dept: REHABILITATION | Facility: HOSPITAL | Age: 75
End: 2025-07-28
Payer: MEDICARE

## 2025-07-28 ENCOUNTER — LAB VISIT (OUTPATIENT)
Dept: LAB | Facility: HOSPITAL | Age: 75
End: 2025-07-28
Attending: FAMILY MEDICINE
Payer: MEDICARE

## 2025-07-28 DIAGNOSIS — E11.9 TYPE 2 DIABETES MELLITUS WITHOUT COMPLICATION, WITH LONG-TERM CURRENT USE OF INSULIN: ICD-10-CM

## 2025-07-28 DIAGNOSIS — Z79.4 TYPE 2 DIABETES MELLITUS WITHOUT COMPLICATION, WITH LONG-TERM CURRENT USE OF INSULIN: ICD-10-CM

## 2025-07-28 DIAGNOSIS — M53.3 SI (SACROILIAC) JOINT DYSFUNCTION: Primary | ICD-10-CM

## 2025-07-28 DIAGNOSIS — Z74.09 IMPAIRED FUNCTIONAL MOBILITY, BALANCE, GAIT, AND ENDURANCE: ICD-10-CM

## 2025-07-28 LAB
ALBUMIN/CREAT UR: 3.8 UG/MG
CHOLEST SERPL-MCNC: 194 MG/DL (ref 120–199)
CHOLEST/HDLC SERPL: 4.1 {RATIO} (ref 2–5)
CREAT UR-MCNC: 160 MG/DL (ref 15–325)
EAG (OHS): 169 MG/DL (ref 68–131)
HBA1C MFR BLD: 7.5 % (ref 4–5.6)
HDLC SERPL-MCNC: 47 MG/DL (ref 40–75)
HDLC SERPL: 24.2 % (ref 20–50)
LDLC SERPL CALC-MCNC: 114.2 MG/DL (ref 63–159)
MICROALBUMIN UR-MCNC: 6 UG/ML (ref ?–5000)
NONHDLC SERPL-MCNC: 147 MG/DL
TRIGL SERPL-MCNC: 164 MG/DL (ref 30–150)

## 2025-07-28 PROCEDURE — 36415 COLL VENOUS BLD VENIPUNCTURE: CPT

## 2025-07-28 PROCEDURE — 82570 ASSAY OF URINE CREATININE: CPT

## 2025-07-28 PROCEDURE — 83036 HEMOGLOBIN GLYCOSYLATED A1C: CPT

## 2025-07-28 PROCEDURE — 97140 MANUAL THERAPY 1/> REGIONS: CPT | Mod: PN

## 2025-07-28 PROCEDURE — 82465 ASSAY BLD/SERUM CHOLESTEROL: CPT

## 2025-07-28 PROCEDURE — 97112 NEUROMUSCULAR REEDUCATION: CPT | Mod: PN

## 2025-07-29 NOTE — PROGRESS NOTES
Outpatient Rehab    Physical Therapy Visit    Patient Name: Yaneth Vera  MRN: [id number]  YOB: 1950  Encounter Date: 7/28/2025    Therapy Diagnosis:   Encounter Diagnoses   Name Primary?    SI (sacroiliac) joint dysfunction Yes    Impaired functional mobility, balance, gait, and endurance      Physician: Leonila Norman MD    Physician Orders: Eval and Treat  Medical Diagnosis: Sacrococcygeal disorders, not elsewhere classified  Surgical Diagnosis: Not applicable for this Episode   Surgical Date: Not applicable for this Episode  Days Since Last Surgery: Not applicable for this Episode    Visit # / Visits Authorized:  9 / 10  Insurance Authorization Period: 4/11/2025 to 12/31/2025  Date of Evaluation: 4/9/2025  Plan of Care Certification: 7/11/2025 to 10/11/2025      PT/PTA: PT   Number of PTA visits since last PT visit:0  Time In: 1000   Time Out: 1100  Total Time (in minutes): 60   Total Billable Time (in minutes): 30 split billing; time reflects 1:1 patient care     FOTO:  Intake Score (%): Not applicable for this Episode  Survey Score 2 (%): Not applicable for this Episode  Survey Score 3 (%): Not applicable for this Episode    Precautions:         Subjective   Patient worked out in the yard yesterday morning for about 3 hours; used the grabber to  debris, felt like she did well; no real exacerbation of her pain with this.  Has an appointment with Dr. Norman tomorrow.  WIll be leaving town again on Sunday for several weeks..  Pain reported as 3/10. left side of buttock, lower back    Objective            Treatment:  Manual Therapy  MT 1: S/L on left - segmental lumbar flexion mobilization with blocking of segment above for max facet motion - left side  MT 3: Prone - FDN to lumbar spine into left SIJ and piriformis:  Proper head/trunk/ankle support given. LF/ES using 60 mm needles 1/5 fb lateral to L4, L5, S1 at 45 deg angle to tissue tightness with clockwise winding of needle for increased  "tissue grasp; 75 mm needle to left SIJ and into left piriformis - depth to tissue tightness with clockwise winding for tissue grasp.  Intensity of stimulation adjusted to patient tolerance with good rhythmical vibration of tissue noted.  Needles left insitu x 12 mins. Removed without adverse effects.  MT 4: IASTM to lumbar/left SIJ following removal of needles to address tissue tightness.  Balance/Neuromuscular Re-Education  NMR 1: Supine: 3 way hamstring stretch with strap - 30 sec holds in each position  NMR 2: Piriformis stretch on left/right - 30 sec hold x 2  NMR 3: Supine: activation of TrAb's with progression to posterior pelvic tilt - 5" hold x 10 - focus on breathing  NMR 4: Supine: SIJ mobilization - alternate push/contract against knees to stabilize SI joint - x 10  NMR 5: Bridging: band around thighs ( pink) x 10  Therapeutic Activity  TA 1: Recumbent bike - Level 4 x 15 mins  TA 6: Seated: hip hinging with forward flex of trunk x 10    Time Entry(in minutes):  Manual Therapy Time Entry: 15  Neuromuscular Re-Education Time Entry: 15    Assessment & Plan   Assessment: Less sensitivity to palpation noted over left lumbar fascia and left SIJ; FDN focus on lumbar fascia and left SIJ jt with positive response.  Patient walking better - still with lateral waddle, but quicker pace; Still demonstrates limited movement due to fascia restrictions in lumbar spine and gluteal mm, but exercise  last week and today have made some improvements.  Patient has to keep doing something everyday to work of tissue extensibility to reduce her pain and improve her movement patterns.  We have one more appointment this week before she heads back out of town for a couple of weeks.  Need to continue with therapy upon return.   Evaluation/Treatment Tolerance: Patient tolerated treatment well    The patient will continue to benefit from skilled outpatient physical therapy in order to address the deficits listed in the problem list on " the initial evaluation, provide patient and family education, and maximize the patients level of independence in the home and community environments.     The patient's spiritual, cultural, and educational needs were considered, and the patient is agreeable to the plan of care and goals.           Plan: COntinue with therapy for last visit scheduled with week.  Patient will then be out of town for several weeks.  Would like to contiue upon her return as she has had positive response to therapy over the course of this session.    Goals:   Active       Physical Therapy       Physical Therapy Goal (Progressing)       Start:  04/10/25    Expected End:  09/26/25       Goals:  Short Term Goals: 3 weeks   Pt will be compliant with HEP.  Pt will improve quad strength by 1/2 grade to allow for strength to go up and down stairs.   Pt will improve sit <>  30 sec to at least 10 allow for increased functional mobility.     Long Term Goals: 6 weeks   Pt will be independent with HEP to allow for increased functional tasks.  Pt will improve FOTO by 10 % to demonstrate improvement in quality of life.  Pt will improve hip flexor strength by 1/2 grade to allow for normalized body mechanics.              Teresa Pabon, PT

## 2025-07-31 ENCOUNTER — CLINICAL SUPPORT (OUTPATIENT)
Dept: REHABILITATION | Facility: HOSPITAL | Age: 75
End: 2025-07-31
Payer: MEDICARE

## 2025-07-31 DIAGNOSIS — Z74.09 IMPAIRED FUNCTIONAL MOBILITY, BALANCE, GAIT, AND ENDURANCE: ICD-10-CM

## 2025-07-31 DIAGNOSIS — M53.3 SI (SACROILIAC) JOINT DYSFUNCTION: Primary | ICD-10-CM

## 2025-07-31 PROCEDURE — 97112 NEUROMUSCULAR REEDUCATION: CPT | Mod: PN

## 2025-07-31 PROCEDURE — 97140 MANUAL THERAPY 1/> REGIONS: CPT | Mod: PN

## 2025-07-31 PROCEDURE — 97530 THERAPEUTIC ACTIVITIES: CPT | Mod: PN

## 2025-08-01 NOTE — PROGRESS NOTES
Outpatient Rehab    Physical Therapy Discharge    Patient Name: Yaneth Vera  MRN: [id number]  YOB: 1950  Encounter Date: 7/31/2025    Therapy Diagnosis:   Encounter Diagnoses   Name Primary?    SI (sacroiliac) joint dysfunction Yes    Impaired functional mobility, balance, gait, and endurance      Physician: Leonila Norman MD    Physician Orders: Eval and Treat  Medical Diagnosis: Sacrococcygeal disorders, not elsewhere classified  Surgical Diagnosis: Not applicable for this Episode   Surgical Date: Not applicable for this Episode  Days Since Last Surgery: Not applicable for this Episode    Visit # / Visits Authorized:  10 / 10  Insurance Authorization Period: 4/11/2025 to 12/31/2025  Date of Evaluation: 4/9/2025  Plan of Care Certification: 7/11/2025 to 10/11/2025      PT/PTA: PT   Number of PTA visits since last PT visit:0  Time In: 1000   Time Out: 1100  Total Time (in minutes): 60   Total Billable Time (in minutes): 60    FOTO:  Intake Score (%): Not applicable for this Episode  Survey Score 2 (%): Not applicable for this Episode  Survey Score 3 (%): Not applicable for this Episode    Precautions:       Subjective   Saw Dr. Norman earlier this week.  He said that as long as she was doing better, he would not recommend any type of injections at this time.  Gave Yaneth a new order to use for PT when she needed it.  Today is her last approvied day of therapy.  Yaneth will be leaving town for several weeks;  will make every effort to walk, do her exercies while in [place]; Will join [employer] when she gets back and see how she does.  When she needs PT again, will bring in order..    2-3/10 lower back - left buttock    Objective        Hip Range of Motion    Pt strength is grossly 4/5                  Knee Strength    Right Strength Right Pain Left Strength Left  Pain   Flexion (S2) 4-   4+     Prone Flexion           Extension (L3) 4-   4+          Treatment:  Therapeutic Exercise  TE 3: Supine H/S stretch  "w/ strap 3 x 30 sec  TE 6: Heel Cord stretch on wedge 3 x 30 sec  Manual Therapy  MT 1: S/L on left - segmental lumbar flexion mobilization with blocking of segment above for max facet motion - left side  MT 3: Prone - FDN to lumbar spine into left SIJ and piriformis:  Proper head/trunk/ankle support given. LF/ES using 60 mm needles 1/5 fb lateral to L4, L5, S1 at 45 deg angle to tissue tightness with clockwise winding of needle for increased tissue grasp; 75 mm needle to left SIJ and into left piriformis - depth to tissue tightness with clockwise winding for tissue grasp.  Intensity of stimulation adjusted to patient tolerance with good rhythmical vibration of tissue noted.  Needles left insitu x 12 mins. Removed without adverse effects.  MT 4: IASTM to lumbar/left SIJ following removal of needles to address tissue tightness.  Balance/Neuromuscular Re-Education  NMR 1: Supine: 3 way hamstring stretch with strap - 30 sec holds in each position  NMR 2: Piriformis stretch on left/right - 30 sec hold x 2  NMR 3: Supine: activation of TrAb's with progression to posterior pelvic tilt - 5" hold x 10 - focus on breathing  Therapeutic Activity  TA 1: Recumbent bike - Level 4 x 15 mins  TA 6: Seated: hip hinging with forward flex of trunk x 10    Time Entry(in minutes):  Manual Therapy Time Entry: 30  Neuromuscular Re-Education Time Entry: 15  Therapeutic Activity Time Entry: 15    Assessment & Plan   Assessment: Less sensitivity to palpation noted over left lumbar fascia and left SIJ; FDN focus on lumbar fascia and left SIJ jt with positive response.  Patient walking better - still with lateral waddle, but quicker pace; Still demonstrates limited movement due to fascia restrictions in lumbar spine and gluteal mm, but exercise  last week and today have made some improvements.  Patient has to keep doing something everyday to work of tissue extensibility to reduce her pain and improve her movement patterns.  Emphasized to " patient the importance of exercise/activity while she is in [place].  She voiced understanding of this.  Will discharge from therapy at this time.  Patient to continue independently when she returns.  Has new order for therapy when needed.   Evaluation/Treatment Tolerance: Patient tolerated treatment well    The patient's spiritual, cultural, and educational needs were considered, and the patient is agreeable to the plan of care and goals.           Plan: Discharge from physical therapy at this time.  Patient is going out of town for several weeks.  Will continue on [employer] when she returns.  Does have new orders from Dr. Norman when needed.    Goals:  Progress made on all goals, but did not meet them in the 10 visits.   Active       Physical Therapy       Physical Therapy Goal (Progressing)       Start:  04/10/25    Expected End:  09/26/25       Goals:  Short Term Goals: 3 weeks   Pt will be compliant with HEP.  Pt will improve quad strength by 1/2 grade to allow for strength to go up and down stairs.   Pt will improve sit <>  30 sec to at least 10 allow for increased functional mobility.     Long Term Goals: 6 weeks   Pt will be independent with HEP to allow for increased functional tasks.  Pt will improve FOTO by 10 % to demonstrate improvement in quality of life.  Pt will improve hip flexor strength by 1/2 grade to allow for normalized body mechanics.              Teresa Pabon, PT

## (undated) DEVICE — NDL SPINAL 22GX5

## (undated) DEVICE — NDL SPINAL 22GA 3.5 IN QUINCKE

## (undated) DEVICE — SYR DISP LL 5CC

## (undated) DEVICE — KIT NERVE BLOCK PREP BAPTIST

## (undated) DEVICE — NDL BLUNT W/O FILTER 18GX1.5IN

## (undated) DEVICE — APPLICATOR CHLORAPREP CLR 10.5

## (undated) DEVICE — CANNULA SUPERSOFT CO2 7FT

## (undated) DEVICE — GLOVE SENSICARE PI SURG 7.5

## (undated) DEVICE — PAD ALCOHOL PREP LG STRL 2PLY

## (undated) DEVICE — STRAP OR TABLE 5IN X 72IN

## (undated) DEVICE — SYR 3CC LUER LOC